# Patient Record
Sex: FEMALE | Race: WHITE | NOT HISPANIC OR LATINO | Employment: OTHER | URBAN - METROPOLITAN AREA
[De-identification: names, ages, dates, MRNs, and addresses within clinical notes are randomized per-mention and may not be internally consistent; named-entity substitution may affect disease eponyms.]

---

## 2021-03-08 ENCOUNTER — RECORDS - HEALTHEAST (OUTPATIENT)
Dept: LAB | Facility: CLINIC | Age: 86
End: 2021-03-08

## 2021-03-09 LAB
ANION GAP SERPL CALCULATED.3IONS-SCNC: 8 MMOL/L (ref 5–18)
BNP SERPL-MCNC: 303 PG/ML (ref 0–167)
BUN SERPL-MCNC: 15 MG/DL (ref 8–28)
CALCIUM SERPL-MCNC: 8.4 MG/DL (ref 8.5–10.5)
CHLORIDE BLD-SCNC: 109 MMOL/L (ref 98–107)
CO2 SERPL-SCNC: 23 MMOL/L (ref 22–31)
CREAT SERPL-MCNC: 0.65 MG/DL (ref 0.6–1.1)
ERYTHROCYTE [DISTWIDTH] IN BLOOD BY AUTOMATED COUNT: 14 % (ref 11–14.5)
FERRITIN SERPL-MCNC: 114 NG/ML (ref 10–130)
FOLATE SERPL-MCNC: 6.8 NG/ML
GFR SERPL CREATININE-BSD FRML MDRD: >60 ML/MIN/1.73M2
GLUCOSE BLD-MCNC: 82 MG/DL (ref 70–125)
HCT VFR BLD AUTO: 31.7 % (ref 35–47)
HGB BLD-MCNC: 9.9 G/DL (ref 12–16)
IRON SATN MFR SERPL: 10 % (ref 20–50)
IRON SERPL-MCNC: 26 UG/DL (ref 42–175)
MCH RBC QN AUTO: 27.7 PG (ref 27–34)
MCHC RBC AUTO-ENTMCNC: 31.2 G/DL (ref 32–36)
MCV RBC AUTO: 89 FL (ref 80–100)
PLATELET # BLD AUTO: 317 THOU/UL (ref 140–440)
PMV BLD AUTO: 10.3 FL (ref 8.5–12.5)
POTASSIUM BLD-SCNC: 4 MMOL/L (ref 3.5–5)
RBC # BLD AUTO: 3.57 MILL/UL (ref 3.8–5.4)
SODIUM SERPL-SCNC: 140 MMOL/L (ref 136–145)
TIBC SERPL-MCNC: 266 UG/DL (ref 313–563)
TRANSFERRIN SERPL-MCNC: 212 MG/DL (ref 212–360)
TSH SERPL DL<=0.005 MIU/L-ACNC: 4.45 UIU/ML (ref 0.3–5)
VIT B12 SERPL-MCNC: 317 PG/ML (ref 213–816)
WBC: 8.6 THOU/UL (ref 4–11)

## 2021-05-16 ENCOUNTER — RECORDS - HEALTHEAST (OUTPATIENT)
Dept: LAB | Facility: CLINIC | Age: 86
End: 2021-05-16

## 2021-05-18 ENCOUNTER — RECORDS - HEALTHEAST (OUTPATIENT)
Dept: LAB | Facility: CLINIC | Age: 86
End: 2021-05-18

## 2021-05-18 LAB — CK SERPL-CCNC: 12 U/L (ref 30–190)

## 2021-05-19 LAB — ERYTHROCYTE [SEDIMENTATION RATE] IN BLOOD BY WESTERGREN METHOD: 63 MM/HR (ref 0–20)

## 2021-06-26 ENCOUNTER — APPOINTMENT (OUTPATIENT)
Dept: CT IMAGING | Facility: CLINIC | Age: 86
DRG: 288 | End: 2021-06-26
Attending: STUDENT IN AN ORGANIZED HEALTH CARE EDUCATION/TRAINING PROGRAM
Payer: MEDICARE

## 2021-06-26 ENCOUNTER — TRANSFERRED RECORDS (OUTPATIENT)
Dept: HEALTH INFORMATION MANAGEMENT | Facility: CLINIC | Age: 86
End: 2021-06-26

## 2021-06-26 ENCOUNTER — HOSPITAL ENCOUNTER (INPATIENT)
Facility: CLINIC | Age: 86
LOS: 12 days | Discharge: NURSING, REHAB OR LONG-TERM CARE | DRG: 288 | End: 2021-07-08
Attending: HOSPITALIST | Admitting: INTERNAL MEDICINE
Payer: MEDICARE

## 2021-06-26 DIAGNOSIS — I63.12 CEREBROVASCULAR ACCIDENT (CVA) DUE TO EMBOLISM OF BASILAR ARTERY (H): ICD-10-CM

## 2021-06-26 DIAGNOSIS — I48.21 PERMANENT ATRIAL FIBRILLATION (H): ICD-10-CM

## 2021-06-26 DIAGNOSIS — I36.1 NONRHEUMATIC TRICUSPID VALVE REGURGITATION: ICD-10-CM

## 2021-06-26 DIAGNOSIS — I65.1 BASILAR ARTERY THROMBOSIS: Primary | ICD-10-CM

## 2021-06-26 DIAGNOSIS — G44.209 TENSION HEADACHE: ICD-10-CM

## 2021-06-26 DIAGNOSIS — E83.39 HYPOPHOSPHATEMIA: ICD-10-CM

## 2021-06-26 DIAGNOSIS — K21.9 GASTROESOPHAGEAL REFLUX DISEASE WITHOUT ESOPHAGITIS: ICD-10-CM

## 2021-06-26 DIAGNOSIS — I33.0 SUBACUTE BACTERIAL ENDOCARDITIS: ICD-10-CM

## 2021-06-26 PROBLEM — Z95.2 S/P AVR: Status: ACTIVE | Noted: 2021-06-26

## 2021-06-26 PROBLEM — K31.819 ARTERIOVENOUS MALFORMATION OF STOMACH: Status: ACTIVE | Noted: 2021-06-26

## 2021-06-26 PROBLEM — I10 BENIGN ESSENTIAL HYPERTENSION: Status: ACTIVE | Noted: 2021-06-26

## 2021-06-26 PROBLEM — Z86.73 HISTORY OF STROKE: Status: ACTIVE | Noted: 2021-06-26

## 2021-06-26 PROBLEM — I63.9 STROKE (H): Status: ACTIVE | Noted: 2021-06-26

## 2021-06-26 LAB
ANION GAP SERPL CALCULATED.3IONS-SCNC: 4 MMOL/L (ref 3–14)
BUN SERPL-MCNC: 16 MG/DL (ref 7–30)
CALCIUM SERPL-MCNC: 7.5 MG/DL (ref 8.5–10.1)
CHLORIDE SERPL-SCNC: 114 MMOL/L (ref 94–109)
CO2 SERPL-SCNC: 21 MMOL/L (ref 20–32)
CREAT SERPL-MCNC: 1.41 MG/DL (ref 0.52–1.04)
ERYTHROCYTE [DISTWIDTH] IN BLOOD BY AUTOMATED COUNT: 16.6 % (ref 10–15)
GFR SERPL CREATININE-BSD FRML MDRD: 33 ML/MIN/{1.73_M2}
GLUCOSE SERPL-MCNC: 88 MG/DL (ref 70–99)
HCT VFR BLD AUTO: 26.6 % (ref 35–47)
HGB BLD-MCNC: 8 G/DL (ref 11.7–15.7)
MCH RBC QN AUTO: 26.1 PG (ref 26.5–33)
MCHC RBC AUTO-ENTMCNC: 30.1 G/DL (ref 31.5–36.5)
MCV RBC AUTO: 87 FL (ref 78–100)
PLATELET # BLD AUTO: 188 10E9/L (ref 150–450)
POTASSIUM SERPL-SCNC: 3.6 MMOL/L (ref 3.4–5.3)
RBC # BLD AUTO: 3.07 10E12/L (ref 3.8–5.2)
SODIUM SERPL-SCNC: 139 MMOL/L (ref 133–144)
WBC # BLD AUTO: 9.8 10E9/L (ref 4–11)

## 2021-06-26 PROCEDURE — 70450 CT HEAD/BRAIN W/O DYE: CPT

## 2021-06-26 PROCEDURE — 200N000001 HC R&B ICU

## 2021-06-26 PROCEDURE — 80048 BASIC METABOLIC PNL TOTAL CA: CPT | Performed by: STUDENT IN AN ORGANIZED HEALTH CARE EDUCATION/TRAINING PROGRAM

## 2021-06-26 PROCEDURE — 99291 CRITICAL CARE FIRST HOUR: CPT | Mod: GC | Performed by: PSYCHIATRY & NEUROLOGY

## 2021-06-26 PROCEDURE — 0042T CT HEAD PERFUSION WITH CONTRAST: CPT

## 2021-06-26 PROCEDURE — 999N001017 HC STATISTIC GLUCOSE BY METER IP

## 2021-06-26 PROCEDURE — 250N000011 HC RX IP 250 OP 636: Performed by: HOSPITALIST

## 2021-06-26 PROCEDURE — 250N000009 HC RX 250: Performed by: INTERNAL MEDICINE

## 2021-06-26 PROCEDURE — 36415 COLL VENOUS BLD VENIPUNCTURE: CPT | Performed by: STUDENT IN AN ORGANIZED HEALTH CARE EDUCATION/TRAINING PROGRAM

## 2021-06-26 PROCEDURE — 250N000011 HC RX IP 250 OP 636: Performed by: INTERNAL MEDICINE

## 2021-06-26 PROCEDURE — 258N000003 HC RX IP 258 OP 636: Performed by: INTERNAL MEDICINE

## 2021-06-26 PROCEDURE — 250N000011 HC RX IP 250 OP 636: Performed by: STUDENT IN AN ORGANIZED HEALTH CARE EDUCATION/TRAINING PROGRAM

## 2021-06-26 PROCEDURE — 85027 COMPLETE CBC AUTOMATED: CPT | Performed by: STUDENT IN AN ORGANIZED HEALTH CARE EDUCATION/TRAINING PROGRAM

## 2021-06-26 PROCEDURE — 70496 CT ANGIOGRAPHY HEAD: CPT

## 2021-06-26 PROCEDURE — 99223 1ST HOSP IP/OBS HIGH 75: CPT | Mod: AI | Performed by: INTERNAL MEDICINE

## 2021-06-26 PROCEDURE — C9113 INJ PANTOPRAZOLE SODIUM, VIA: HCPCS | Performed by: INTERNAL MEDICINE

## 2021-06-26 RX ORDER — HEPARIN SODIUM 10000 [USP'U]/100ML
0-5000 INJECTION, SOLUTION INTRAVENOUS CONTINUOUS
Status: DISCONTINUED | OUTPATIENT
Start: 2021-06-26 | End: 2021-06-29

## 2021-06-26 RX ORDER — SODIUM CHLORIDE 9 MG/ML
INJECTION, SOLUTION INTRAVENOUS CONTINUOUS
Status: DISCONTINUED | OUTPATIENT
Start: 2021-06-26 | End: 2021-06-28

## 2021-06-26 RX ORDER — METOPROLOL TARTRATE 1 MG/ML
5 INJECTION, SOLUTION INTRAVENOUS EVERY 6 HOURS
Status: DISCONTINUED | OUTPATIENT
Start: 2021-06-26 | End: 2021-06-26

## 2021-06-26 RX ORDER — METOPROLOL TARTRATE 25 MG/1
12.5 TABLET, FILM COATED ORAL 2 TIMES DAILY
Status: ON HOLD | COMMUNITY
End: 2021-07-06

## 2021-06-26 RX ORDER — GABAPENTIN 300 MG/1
300 CAPSULE ORAL AT BEDTIME
Status: ON HOLD | COMMUNITY
End: 2021-07-06

## 2021-06-26 RX ORDER — LANOLIN ALCOHOL/MO/W.PET/CERES
1000 CREAM (GRAM) TOPICAL DAILY
COMMUNITY

## 2021-06-26 RX ORDER — MIRTAZAPINE 15 MG/1
15 TABLET, FILM COATED ORAL AT BEDTIME
Status: ON HOLD | COMMUNITY
End: 2021-06-26

## 2021-06-26 RX ORDER — ONDANSETRON 4 MG/1
4 TABLET, ORALLY DISINTEGRATING ORAL EVERY MORNING
Status: ON HOLD | COMMUNITY
End: 2021-07-06

## 2021-06-26 RX ORDER — HEPARIN SODIUM 10000 [USP'U]/100ML
0-5000 INJECTION, SOLUTION INTRAVENOUS CONTINUOUS
Status: DISCONTINUED | OUTPATIENT
Start: 2021-06-26 | End: 2021-06-26

## 2021-06-26 RX ORDER — METOPROLOL TARTRATE 1 MG/ML
2.5 INJECTION, SOLUTION INTRAVENOUS EVERY 6 HOURS
Status: DISCONTINUED | OUTPATIENT
Start: 2021-06-26 | End: 2021-06-28

## 2021-06-26 RX ORDER — IOPAMIDOL 755 MG/ML
120 INJECTION, SOLUTION INTRAVASCULAR ONCE
Status: COMPLETED | OUTPATIENT
Start: 2021-06-26 | End: 2021-06-26

## 2021-06-26 RX ORDER — ONDANSETRON 4 MG/1
4 TABLET, ORALLY DISINTEGRATING ORAL EVERY 6 HOURS PRN
Status: DISCONTINUED | OUTPATIENT
Start: 2021-06-26 | End: 2021-07-08 | Stop reason: HOSPADM

## 2021-06-26 RX ORDER — LATANOPROST 50 UG/ML
1 SOLUTION/ DROPS OPHTHALMIC AT BEDTIME
COMMUNITY

## 2021-06-26 RX ORDER — ONDANSETRON 2 MG/ML
4 INJECTION INTRAMUSCULAR; INTRAVENOUS EVERY 6 HOURS PRN
Status: DISCONTINUED | OUTPATIENT
Start: 2021-06-26 | End: 2021-07-08 | Stop reason: HOSPADM

## 2021-06-26 RX ORDER — LIDOCAINE 40 MG/G
CREAM TOPICAL
Status: DISCONTINUED | OUTPATIENT
Start: 2021-06-26 | End: 2021-07-08 | Stop reason: HOSPADM

## 2021-06-26 RX ORDER — MIRTAZAPINE 15 MG/1
15 TABLET, ORALLY DISINTEGRATING ORAL AT BEDTIME
COMMUNITY

## 2021-06-26 RX ORDER — ATORVASTATIN CALCIUM 40 MG/1
40 TABLET, FILM COATED ORAL AT BEDTIME
Status: ON HOLD | COMMUNITY
End: 2021-07-06

## 2021-06-26 RX ORDER — LANOLIN ALCOHOL/MO/W.PET/CERES
2000 CREAM (GRAM) TOPICAL DAILY
COMMUNITY

## 2021-06-26 RX ORDER — PIPERACILLIN SODIUM, TAZOBACTAM SODIUM 2; .25 G/10ML; G/10ML
2.25 INJECTION, POWDER, LYOPHILIZED, FOR SOLUTION INTRAVENOUS EVERY 6 HOURS
Status: DISCONTINUED | OUTPATIENT
Start: 2021-06-26 | End: 2021-06-30

## 2021-06-26 RX ORDER — FERROUS SULFATE 325(65) MG
325 TABLET ORAL
COMMUNITY

## 2021-06-26 RX ORDER — ACETAMINOPHEN 325 MG/1
650 TABLET ORAL EVERY 8 HOURS PRN
COMMUNITY

## 2021-06-26 RX ORDER — ACETAMINOPHEN 650 MG/1
650 SUPPOSITORY RECTAL EVERY 4 HOURS PRN
Status: DISCONTINUED | OUTPATIENT
Start: 2021-06-26 | End: 2021-07-08 | Stop reason: HOSPADM

## 2021-06-26 RX ORDER — ONDANSETRON 4 MG/1
4 TABLET, ORALLY DISINTEGRATING ORAL 2 TIMES DAILY PRN
COMMUNITY

## 2021-06-26 RX ADMIN — METOPROLOL TARTRATE 2.5 MG: 5 INJECTION INTRAVENOUS at 23:54

## 2021-06-26 RX ADMIN — HEPARIN SODIUM 900 UNITS/HR: 10000 INJECTION, SOLUTION INTRAVENOUS at 18:03

## 2021-06-26 RX ADMIN — PANTOPRAZOLE SODIUM 40 MG: 40 INJECTION, POWDER, FOR SOLUTION INTRAVENOUS at 21:06

## 2021-06-26 RX ADMIN — SODIUM CHLORIDE: 9 INJECTION, SOLUTION INTRAVENOUS at 16:37

## 2021-06-26 RX ADMIN — IOPAMIDOL 120 ML: 755 INJECTION, SOLUTION INTRAVENOUS at 14:46

## 2021-06-26 RX ADMIN — PIPERACILLIN SODIUM AND TAZOBACTAM SODIUM 2.25 G: 2; .25 INJECTION, POWDER, LYOPHILIZED, FOR SOLUTION INTRAVENOUS at 16:37

## 2021-06-26 RX ADMIN — ONDANSETRON 4 MG: 2 INJECTION INTRAMUSCULAR; INTRAVENOUS at 23:54

## 2021-06-26 RX ADMIN — METOPROLOL TARTRATE 2.5 MG: 5 INJECTION INTRAVENOUS at 18:16

## 2021-06-26 RX ADMIN — PIPERACILLIN SODIUM AND TAZOBACTAM SODIUM 2.25 G: 2; .25 INJECTION, POWDER, LYOPHILIZED, FOR SOLUTION INTRAVENOUS at 21:06

## 2021-06-26 SDOH — HEALTH STABILITY: MENTAL HEALTH: HOW OFTEN DO YOU HAVE 6 OR MORE DRINKS ON ONE OCCASION?: NOT ASKED

## 2021-06-26 SDOH — ECONOMIC STABILITY: TRANSPORTATION INSECURITY
IN THE PAST 12 MONTHS, HAS LACK OF TRANSPORTATION KEPT YOU FROM MEETINGS, WORK, OR FROM GETTING THINGS NEEDED FOR DAILY LIVING?: NOT ASKED

## 2021-06-26 SDOH — ECONOMIC STABILITY: FOOD INSECURITY: WITHIN THE PAST 12 MONTHS, YOU WORRIED THAT YOUR FOOD WOULD RUN OUT BEFORE YOU GOT MONEY TO BUY MORE.: NOT ASKED

## 2021-06-26 SDOH — HEALTH STABILITY: MENTAL HEALTH: HOW OFTEN DO YOU HAVE A DRINK CONTAINING ALCOHOL?: NOT ASKED

## 2021-06-26 SDOH — HEALTH STABILITY: MENTAL HEALTH: HOW MANY STANDARD DRINKS CONTAINING ALCOHOL DO YOU HAVE ON A TYPICAL DAY?: NOT ASKED

## 2021-06-26 SDOH — ECONOMIC STABILITY: TRANSPORTATION INSECURITY
IN THE PAST 12 MONTHS, HAS THE LACK OF TRANSPORTATION KEPT YOU FROM MEDICAL APPOINTMENTS OR FROM GETTING MEDICATIONS?: NOT ASKED

## 2021-06-26 SDOH — ECONOMIC STABILITY: INCOME INSECURITY: HOW HARD IS IT FOR YOU TO PAY FOR THE VERY BASICS LIKE FOOD, HOUSING, MEDICAL CARE, AND HEATING?: NOT ASKED

## 2021-06-26 SDOH — ECONOMIC STABILITY: FOOD INSECURITY: WITHIN THE PAST 12 MONTHS, THE FOOD YOU BOUGHT JUST DIDN'T LAST AND YOU DIDN'T HAVE MONEY TO GET MORE.: NOT ASKED

## 2021-06-26 ASSESSMENT — ACTIVITIES OF DAILY LIVING (ADL)
ADLS_ACUITY_SCORE: 24
ADLS_ACUITY_SCORE: 30

## 2021-06-26 ASSESSMENT — MIFFLIN-ST. JEOR: SCORE: 1110.38

## 2021-06-26 NOTE — LETTER
This letter is to give you information only. No action is required on your part.                               Beneficiary Notification Letter - BPCI Advanced                     Your Doctor or Hospital Has Joined Medicare s New Payment and                                                          Service Delivery Model     Hello,     We wanted to let you know that your health care provider, HECTOR Torrez, has volunteered to take part in our Centers for Medicare & Medicaid Services (CMS) Bundled Payments for Care Improvement Advanced Model (BPCI Advanced). This doesn t change your Medicare rights or benefits and you don t need to do anything.      What are bundled payments?   A bundled payment combines, or bundles together, payments that Medicare makes to your health care providers for the many different kinds of medical services you might get in a specific time period. In BPCI Advanced, this time period could include a hospital inpatient stay or outpatient procedure, plus 90 days.     Why would Medicare bundle payments?   Bundled payments are thought of as a  value-based  way to pay because health care providers are responsible for both the quality and cost of medical care they give. This is a relatively new way of paying health care providers compared to the fee-for-service  way Medicare has traditionally paid, where providers are paid separately for each service they provide. Bundled payments encourage these providers to work together to provide better, more coordinated care during your hospital stay, or outpatient procedure, and through your recovery.     What does BPCI Advanced mean for me?   You re more likely to get even better care when hospitals, doctors, and other health care providers work together. In BPCI Advanced, hospitals, doctors, and other health care providers may be rewarded for providing better, more coordinated health care. Medicare will watch BPCI Advanced participants closely  to make sure that you and other patients keep getting efficient, high quality care.     What do I need to know about BPCI Advanced?   What s most important for you to know is that your Medicare rights and benefits don t change because your health care provider is participating in BPCI Advanced. Medicare will keep covering all of your medically necessary services.       January 2019    Beneficiary Notification Letter - BPCI Advanced (page 2)     Even though Medicare will pay your doctor in a different way under BPCI Advanced, how much you have to pay won t change. Health care providers and suppliers who are enrolled in Medicare will submit their Medicare claims like they always have.     You ll have all the same Medicare rights and protections, including the right to choose which hospital, doctor, or other health care provider you see. If you don t want to get care from a health care provider who s participating in BPCI Advanced, then you ll have to choose a different health care provider who s not participating in the Model.     How can I give feedback about my health care?   Medicare might ask you to take a voluntary survey about the services and care you received from [facility] during your hospital stay or outpatient procedure and for a specific period of time afterwards. You can decide whether you want to take the voluntary survey, but if you do, it ll help Medicare make BPCI Advanced and the care of other Medicare patients better.     If you have concerns or complaints about your care, you can:     Talk to your doctor or health care provider.     Contact your Beneficiary and Family Centered Care Quality Improvement Organization (CC-QIO). You can get your BFCC-QIO s phone number at Medicare.gov/contacts or by calling 1-800-MEDICARE. [employer] users can call 1-997.874.4659.     Where can I learn more about BPCI Advanced?   Learn more about BPCI Advanced at https://innovation.cms.gov/initiatives/bpci-advanced/:      A list of all the hospitals and physician group practices in the country participating in BPCI Advanced.     All of the inpatient and outpatient Clinical Episodes that are currently included under BPCI Advanced. A Clinical Episode is a grouping of medical conditions or diagnoses that are included in the BPCI Advanced Model.                                   January 2019

## 2021-06-26 NOTE — H&P
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  Concerning that this developed while anticoagulated with Eliquis bid   -- Neurology consulted, currently admitted to ICU in case intervention required     Permanent atrial fib -- on Eliquis   -- on Metoprolol     Fever, temp to 101.5 -- source unclear, right dorsal foot looks more like bruise than cellulitis, but is circled with marker and will watch, and hx of recurrent UTI's but UA with leukocyte esterase negative this AM   -- continue IV Zosyn (no further IV Vanco, Creat 1.5), and await cultures from [facility]    CRF stage 3 -- creat 1.5 this AM, and got contrast with CTA, and now neurology planning to repeat CTA   -- IV NS   -- BMP in AM    Anemia -- appears chronic, and does not appear active GI bleed at this time, but at risk given AVM's    Active Problems:    History of stroke -- prior old Right frontal, and recently left Cerebellar on 4/8/21, and unable to walk related to left leg weakness      Benign essential hypertension      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  Recent GI bleed from gastric AVM's appears stable on PPI, and will continue IV Protonix bid for now.      DVT Prophylaxis: IV heparin   Code Status: DNR / DNI    Disposition: Expected discharge in 3 days, anticipate may need TCU.     Tayo Cai  Pager: [phone]  Cell Phone:  [phone]     Primary Care Physician   No primary care provider on file.    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 but actually the last hgb was 7.6 on 6/20/21, so appears stable.   Daughter did report small amount of BRBPR on Thursday (2 days ago), but 2 normal BM's after that.  Head CT shows old strokes, and head CTA shows new filling defect in basilar artery suggesting thrombosis.  Discussed with Dr. Summers, Neurology, and he suggested no IV heparin at the time of transfer.  She is DNR/DNI per her 2 children with her in room.      PAST MEDICAL HISTORY    Past Medical History:   Diagnosis Date     Chronic atrial fibrillation -- on Eliquis      Gastric AVM's with bleeding 6/16/21      History of recurrent UTIs      History of strokes     Right frontal previous, left Cerebellar 4/14/21     S/P AVR (Bioprosthetic) and Ascending Aorta tube graft         PAST SURGICAL HISTORY    Past Surgical History:   Procedure Laterality Date     REPAIR  ANEURYSM ASCENDING AORTA      tube graft     REPLACE VALVE AORTIC          Prior to Admission Medications   Prior to Admission Medications   Prescriptions Last Dose Informant Patient Reported? Taking?   acetaminophen (TYLENOL) 325 MG tablet Past Month at seldom  Yes Yes   Sig: Take 650 mg by mouth every 8 hours as needed for mild pain   apixaban ANTICOAGULANT (ELIQUIS) 5 MG tablet 6/25/2021 at pm  Yes Yes   Sig: Take 5 mg by mouth 2 times daily   atorvastatin (LIPITOR) 40 MG tablet 6/25/2021 at pm  Yes Yes   Sig: Take 40 mg by mouth At Bedtime   cyanocobalamin (VITAMIN B-12) 1000 MCG tablet 6/25/2021 at am  Yes Yes   Sig: Take 1,000 mcg by mouth daily   ferrous sulfate (FEROSUL) 325 (65 Fe) MG tablet 6/25/2021  Yes Yes   Sig: Take 325 mg by mouth three times a week Mon - Wed - Fri. Takes with orange juice.   folic acid (FOLVITE) 400 MCG tablet 6/25/2021 at am  Yes Yes   Sig: Take 2,000 mcg by mouth daily 400 mcg x 5 tablets for 1 month then on 7/18/21 decrease to 400 mcg daily   gabapentin (NEURONTIN) 300 MG capsule 6/25/2021 at pm  Yes Yes   Sig: Take 300 mg by mouth At Bedtime   latanoprost (XALATAN) 0.005 % ophthalmic solution 6/25/2021 at pm  Yes Yes   Sig: Place 1 drop into both eyes At Bedtime   metoprolol tartrate (LOPRESSOR) 25 MG tablet 6/25/2021 at pm  Yes Yes   Sig: Take 12.5 mg by mouth 2 times daily   mirtazapine (REMERON SOL-TAB) 15 MG ODT 6/25/2021 at pm  Yes Yes   Sig: 15 mg by Orally disintegrating tablet route At Bedtime   omeprazole (PRILOSEC) 20 MG DR capsule 6/25/2021 at am  Yes Yes   Sig: Take 20 mg by mouth daily   ondansetron (ZOFRAN-ODT) 4 MG ODT tab 6/25/2021 at am  Yes Yes   Sig: Take 4 mg by mouth every morning   ondansetron (ZOFRAN-ODT) 4 MG ODT tab Not Needed  Yes Yes   Sig: Take 4 mg by mouth 2 times daily as needed for nausea      Facility-Administered Medications: None     Allergies   No Known Allergies    SOCIAL HISTORY    Social History     Social History Narrative    , 5  children, she lives in a house with the help of her daughter who lives with her (she can not walk because of left leg weakness from prior stroke).  Antonette is POA (daughter) and confirms DNR/DNI code status.  (last updated 6/26/2021)       Social History     Tobacco Use     Smoking status: Never Smoker   Substance Use Topics     Alcohol use: Not Currently     Drug use: Not on file        FAMILY HISTORY    Family History   Problem Relation Age of Onset     Cerebrovascular Disease Mother      Other - See Comments Father         MVA, then suicide        Review of Systems   Review of systems not obtained due to patient factors - drowsiness      PHYSICAL EXAM     Temp: 98.4  F (36.9  C) Temp src: Oral BP: 98/53 Pulse: 73   Resp: 12 SpO2: 100 % O2 Device: Nasal cannula Oxygen Delivery: 2 LPM  Vital Signs with Ranges  Temp:  [98.4  F (36.9  C)] 98.4  F (36.9  C)  Pulse:  [73-85] 73  Resp:  [8-12] 12  BP: ()/(50-59) 98/53  SpO2:  [100 %] 100 %  164 lbs 14.47 oz    Constitutional: Sleepy but will open eyes to physical stimulation and nod yes or no  Eyes: Conjunctiva and pupils examined and normal.  HEENT: Moist mucous membranes, normal dentition.  Respiratory: Clear to auscultation bilaterally, no crackles or wheezing.  Cardiovascular: irregular rate and rhythm, normal S1 and S2, and no murmur noted, no carotid bruits.  2+ bilateral ankle edema.   GI: Soft, non-distended, non-tender, normal bowel sounds.  Lymph/Hematologic: No anterior cervical, supraclavicular or axillary adenopathy.  Skin: No rashes, no cyanosis.   Musculoskeletal: No joint swelling, erythema or tenderness.  Neurologic: Drowsy, opens eyes to pain, will respond to simple questions with yes or no, mild right face droop noted when asked to smile,  4/5 bilaterally  Psychiatric:  No obvious anxiety or depression.    Data   Data reviewed today:  I personally reviewed no images or EKG's today.  Recent Labs   Lab 06/26/21  1531   WBC 9.8   HGB 8.0*   MCV 87          POTASSIUM 3.6   CHLORIDE 114*   CO2 21   BUN 16   CR 1.41*   ANIONGAP 4   MANDI 7.5*   GLC 88       Imaging:  Recent Results (from the past 24 hour(s))   CTA Head Neck with Contrast    Narrative    EXAM: CTA HEAD AND NECK  LOCATION: [facility]  DATE/TIME: 6/26/2021 7:55 AM    INDICATION: Neuro deficit, acute, stroke suspected Stroke code, facial droop  COMPARISON: CT angiogram head and neck 04/07/2021.  CONTRAST: Iopamidol (Isovue-370)    75ml  TECHNIQUE: Head and neck CT angiogram with IV contrast. Noncontrast head CT followed by axial helical CT images of the head and neck vessels obtained during the arterial phase of intravenous contrast administration. Axial 2D reconstructed images and   multiplanar 3D MIP reconstructed images of the head and neck vessels were performed by the technologist. Dose reduction techniques were used. All stenosis measurements made according to NASCET criteria unless otherwise specified.    FINDINGS:   NONCONTRAST HEAD CT:   No evidence of acute intracranial hemorrhage or mass effect. Chronic infarct within the right frontal lobe, left medial temporal lobe and left cerebellum. Scattered foci of decreased attenuation within the cerebral hemispheric white matter which are   nonspecific, though most commonly ascribed to chronic small vessel ischemic disease. The ventricles and sulci are prominent consistent with moderate brain parenchymal volume loss. Chronic lacunar infarct within the left joey. Gray-white matter   differentiation is otherwise maintained. The basilar cisterns are patent.    The globes are unremarkable. The partially imaged are nasal sinuses, mastoid air cells and middle ear cavities are unremarkable. The visualized skull base and calvarium are unremarkable.    HEAD CTA:  Examination of the anterior circulation demonstrates flow within the bilateral internal carotid and proximal aspects of the anterior and middle cerebral  arteries. The anterior communicating artery is demonstrated. Examination of the posterior   circulation   demonstrates flow within the distal vertebral artery. New filling defect within the basilar artery just distal to the confluence of the vertebral artery and basilar artery, concerning for a partially occlusive thrombus. This is best demonstrated on   axial image #327, series 7. There is flow within the distal basilar, proximal aspects of the posterior cerebral arteries. The right and left posterior communicating arteries are not demonstrated with certainty.    No other evidence of pathologic vascular occlusion or saccular aneurysm within the visualized intracranial circulation by CT angiography.    NECK CTA:  The aortic arch has normal branching configuration. There is flow within the brachiocephalic, common carotid, proximal aspects of the subclavian arteries.    The right common carotid artery is patent. Examination of the right carotid bulb demonstrates no evidence of hemodynamically significant stenosis within the right internal carotid artery within the neck.    The left common carotid artery is patent. Examination of the left carotid bulb demonstrates no evidence of hemodynamically significant stenosis within the left internal carotid artery within the neck.    The right and left vertebral arteries are patent.    The parotid, submandibular and thyroid glands are unremarkable.    No evidence of cervical lymphadenopathy by size or morphologic criteria.    The partially imaged lung apices are unremarkable.    No suspicious osseous lesions.      Impression    HEAD CT:  1.  No evidence of acute intracranial hemorrhage or mass effect.  2.  Chronic multifocal infarcts within the right frontal lobe, left occipital lobe, left cerebellum and left joey.  3.  Mild nonspecific white matter changes.  4.  Moderate brain parenchymal volume loss.    Dr. Hdez discussed the results with Dr. Astorga on 6/26/2021 7:50 AM  by telephone.    HEAD CTA:   1.  New filling defect within the basilar artery just distal to the confluence of the vertebral artery and basilar artery, concerning for a partially occlusive thrombus. This is best demonstrated on axial image #327, series 7.  2.  No other evidence of pathologic vascular occlusion or saccular aneurysm within the visualized intrarenal circulation by CT angiography.    NECK CTA:  1.  No evidence of hemodynamically significant stenosis within either internal carotid artery within the neck.    Dr. Hdez discussed the results with Dr. Astorga on 6/26/2021 8:13 AM by telephone.   XR Chest Port 1 View    Narrative    EXAM: XR CHEST 1 VIEW PORTABLE  LOCATION: [facility]  DATE/TIME: 6/26/2021 8:34 AM    INDICATION: fever  COMPARISON: Chest x-ray 04/07/2021      Impression    Poststernotomy with valve replacement. Patchy bibasilar opacities suspicious for pneumonic infiltrates, left greater than right. Suspected trace left-sided pleural fluid. Stable upper normal heart size and central vascular prominence.   CT Head w/o Contrast    Narrative    CT OF THE HEAD WITHOUT CONTRAST 6/26/2021 3:10 PM     COMPARISON: None available    HISTORY: Neurologic deficit, acute, stroke suspected. Known partially  occlusive basilar thrombus.    TECHNIQUE: 5 mm thick axial CT images of the head were acquired  without IV contrast material.    FINDINGS: There is mild diffuse cerebral volume loss. There are subtle  patchy areas of decreased density in the cerebral white matter  bilaterally that are consistent with sequela of chronic small vessel  ischemic disease. There are small areas of hypodensity in the high  posterolateral right frontal lobe (series 3, image 22) and in the  posteromedial left occipital lobe that could represent subacute  ischemic infarct.    The ventricles and basal cisterns are within normal limits in  configuration given the degree of cerebral volume loss.  There is  no  midline shift. There are no extra-axial fluid collections.    No intracranial hemorrhage, mass or recent infarct.    The visualized paranasal sinuses are well-aerated. There is no  mastoiditis. There are no fractures of the visualized bones.      Impression    IMPRESSION:   1. Possible subacute ischemic infarcts in the high right frontal and  left occipital lobes. Brain MRI may be helpful for further evaluation.    2. Diffuse cerebral volume loss and cerebral white matter changes  consistent with chronic small vessel ischemic disease.        Radiation dose for this scan was reduced using automated exposure  control, adjustment of the mA and/or kV according to patient size, or  iterative reconstruction technique    SMITHA WEST MD   CTA Head Neck with Contrast    Narrative    CT ANGIOGRAM OF THE HEAD AND NECK WITHOUT AND WITH CONTRAST  6/26/2021  3:12 PM     COMPARISON: None available    HISTORY: Stroke/transient ischemic attack, assess intracranial  arteries. Known partially occlusive basilar thrombus, more somnolent.    TECHNIQUE:  Precontrast localizing scans were followed by CT  angiography with an injection of 70mL Isovue-370 nonionic intravenous  contrast material with scans through the head and neck.  Images were  transferred to a separate 3-D workstation where multiplanar  reformations and 3-D images were created.  Estimates of carotid  stenoses are made relative to the distal internal carotid artery  diameters except as noted.      FINDINGS:   Neck CTA: The common carotid arteries bilaterally are patent without  vascular narrowing. The cervical internal carotid arteries bilaterally  are tortuous but are patent without vascular narrowing. The vertebral  arteries bilaterally are tortuous but are patent without vascular  narrowing.    Head CTA: There is a filling defect at the confluence between the  right vertebral artery and basilar artery consistent with the history  of nonocclusive basilar artery  thrombus. The basilar artery is  otherwise patent and unremarkable. The bilateral intracranial distal  internal carotid, bilateral anterior cerebral, bilateral middle  cerebral and bilateral posterior cerebral arteries are patent and  unremarkable. The anterior communicating artery is patent.      Impression    IMPRESSION:  1. Presumed nonocclusive thrombus at the proximal basilar artery is  noted consistent with patient's history of partially occlusive basilar  thrombus.  2. Otherwise, normal neck and head CTA.      Radiation dose for this scan was reduced using automated exposure  control, adjustment of the mA and/or kV according to patient size, or  iterative reconstruction technique    SMITHA WEST MD   CT Head Perfusion w Contrast    Narrative    CT BRAIN PERFUSION 6/26/2021 3:14 PM    COMPARISON: None    HISTORY: Neurologic deficit, acute, stroke suspected.    TECHNIQUE: Time sequential axial CT images of the head were acquired  during the administration of intravenous contrast (50mL Isovue-370).  CTA images of the [place] as well as color perfusion maps of  the brain were created from this time sequential axial source data.      Impression    IMPRESSION: There is delayed arrival of the contrast bolus to the left  PICA distribution along the undersurface of the left cerebellar  hemisphere that results in decreased cerebral blood flow but no  significant alteration of cerebral blood volume. No other perfusion  defects.    Radiation dose for this scan was reduced using automated exposure  control, adjustment of the mA and/or kV according to patient size, or  iterative reconstruction technique    SMITHA WEST MD

## 2021-06-26 NOTE — CONSULTS
"   [facility]    Stroke Consult Note    Reason for Consult:  Partially occlusive basilar artery thrombosis    Chief Complaint: No chief complaint on file.       MAYA Thayer is a 89 year old female with history of atrial fibrillation [id number] or greater, not on anticoagulation and recent admission for upper GI bleed who presented to [facility] for right facial droop and is found to have a partially occlusive basilar artery thrombus likely explaining her brainstem stroke symptoms. Emergent thrombectomy was not pursued due to her isolated symptoms. She was transferred for monitoring and potential intervention should her exam worsen.     Per her family, she is quite sleepy most of the time at baseline and does not walk. Today, upon arrival she is quite somnolent and unable to provide any history. Her bedside RN and family at the bedside say this is a change from her initial assessment upon arrival, and from this morning. Repeat CT/CTA/CTP confirms stability of proximal to mid-basilar artery partial occlusion.    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 Somnolence- possibly represents diminished brainstem perfusion, or just sleep-related fluctuations in exam (family says she sleeps most of the day at baseline). Will continue to monitor closely    Recommendations  - Use orderset: \"Ischemic Stroke Routine Admission\" or \"Ischemic Stroke No Thrombolytics/No Thrombectomy ICU Admission\"  - Neurochecks and Vital Signs every 1 hour   - Permissive HTN; goal SBP < 220 mmHg  - low intensity heparin gtt, no loading dose or bolus  - Statin: atorvastatin 40 mg daily, adjustment pending LDL, goal 40-70  - MRI Brain with and without contrast  - TTE with bubble study  - Telemetry, EKG  - Bedside Glucose Monitoring  - Nutrition: pending SLP eval  - A1c, Lipid Panel, Troponin x " "3  - PT/OT/SLP  - Stroke Education  - Euthermia, Euglycemia    Patient Follow-up    - final recommendation pending work-up    Thank you for this consult. We will continue to follow.     The Stroke Staff is Dr. Norton.    Melissa Abbott MD  Vascular Neurology Fellow  To page me or covering stroke neurology team member, click here: AMCOM   Choose \"On Call\" tab at top, then search dropdown box for \"Neurology Adult\", select location, press Enter, then look for stroke/neuro ICU/telestroke.    _____________________________________________________    Past Medical History   Past Medical History:   Diagnosis Date     Chronic atrial fibrillation -- on Eliquis      Gastric AVM's with bleeding 6/16/21      History of recurrent UTIs      History of strokes     Right frontal previous, left Cerebellar 4/14/21     S/P AVR (Bioprosthetic) and Ascending Aorta tube graft      Past Surgical History   Past Surgical History:   Procedure Laterality Date     REPAIR ANEURYSM ASCENDING AORTA      tube graft     REPLACE VALVE AORTIC       Medications   Home Meds  Prior to Admission medications    Not on File       Scheduled Meds      Infusion Meds      PRN Meds      Allergies   No Known Allergies  Family History   Family History   Problem Relation Age of Onset     Cerebrovascular Disease Mother      Other - See Comments Father         MVA, then suicide     Social History   Social History     Tobacco Use     Smoking status: Never Smoker   Substance Use Topics     Alcohol use: Not Currently     Drug use: Not on file       Review of Systems   Review of systems not obtained due to patient factors - mental status       PHYSICAL EXAMINATION   Temp:  [97.4  F (36.3  C)-98.4  F (36.9  C)] 97.4  F (36.3  C)  Pulse:  [73-87] 87  Resp:  [8-15] 15  BP: ()/(50-78) 134/78  SpO2:  [100 %] 100 %    General:  patient lying in bed without any acute distress    HEENT:  normocephalic/atraumatic, edentulous  Cardio:  irregularly irregular  Pulmonary:  " no respiratory distress  Abdomen:  soft, non-tender, non-distended  Extremities:  no edema, peripheral pulses palpable  Skin:  intact, warm/dry     Neurologic  Mental Status:  eyes closed, does not open to voice, requires repetitive verbal and tactile stimulation to maintain brief alertness, oriented to self and hospital, but not time or circumstance. Language is sparse but fluent, follows simple commands.  Cranial Nerves:  does not blink to threat, pupils 2 mm, round, minimally reactive, dysconjugate gaze at rest, L eye exotropia, right facial droop, moderate dysarthria  Motor:  normal muscle tone and bulk, no abnormal movements, able to move all limbs spontaneously, variable strength BUEs related to wakefulness, arms drift symmetrically unless continuously sternal rubbed, no antigravity strength BLEs, wiggles toes readily  Reflexes:  no clonus, toes mute  Sensory:  detects noxious in 4/4 extremities  Coordination:  not ataxia of observable movements  Station/Gait:  not tested    Dysphagia Screen  Dysarthria or facial droop present - Maintain NPO, consult SLP    Stroke Scales    NIHSS  Interval transfer (06/26/21 1433)   Interval Comments     1a. Level of Consciousness 2-->Not alert, requires repeated stimulation to attend, or is obtunded and requires strong or painful stimulation to make movements (not stereotyped)   1b. LOC Questions 2-->Answers neither question correctly   1c. LOC Commands 0-->Performs both tasks correctly   2.   Best Gaze 1-->Partial gaze palsy, gaze is abnormal in one or both eyes, but forced deviation or total gaze paresis is not present   3.   Visual 3-->Bilateral hemianopia (blind including cortical blindness)   4.   Facial Palsy 2-->Partial paralysis (total or near-total paralysis of lower face)   5a. Motor Arm, Left 1-->Drift, limb holds 90 (or 45) degrees, but drifts down before full 10 seconds, does not hit bed or other support   5b. Motor Arm, Right 1-->Drift, limb holds 90 (or 45)  degrees, but drifts down before full 10 secs, does not hit bed or other support   6a. Motor Leg, Left 3-->No effort against gravity, leg falls to bed immediately   6b. Motor Leg, right 3-->No effort against gravity, leg falls to bed immediately   7.   Limb Ataxia 0-->Absent   8.   Sensory 0-->Normal, no sensory loss   9.   Best Language 0-->No aphasia, normal   10. Dysarthria 2-->Severe dysarthria, patients speech is so slurred as to be unintelligible in the absence of or out of proportion to any dysphasia, or is mute/anarthric   11. Extinction and Inattention  0-->No abnormality   Total 20 (06/26/21 1433)       Imaging  I personally reviewed all imaging; relevant findings per HPI.    Labs Data   CBC  Recent Labs   Lab 06/26/21  1531   WBC 9.8   RBC 3.07*   HGB 8.0*   HCT 26.6*        Basic Metabolic Panel   Recent Labs   Lab 06/26/21  1531      POTASSIUM 3.6   CHLORIDE 114*   CO2 21   BUN 16   CR 1.41*   GLC 88   MANDI 7.5*     Liver Panel  No results for input(s): PROTTOTAL, ALBUMIN, BILITOTAL, ALKPHOS, AST, ALT, BILIDIRECT in the last 168 hours.  INR  No lab results found.   Lipid Profile  No lab results found.  A1C  No lab results found.  Troponin I  No results for input(s): TROPI in the last 168 hours.       Stroke Code / Stroke Consult Data Data This was a non-emergent, non-tele stroke consult.

## 2021-06-26 NOTE — PHARMACY-ADMISSION MEDICATION HISTORY
Pharmacy Medication History  Admission medication history interview status for the 6/26/2021  admission is complete. See EPIC admission navigator for prior to admission medications     Location of Interview: Patient room  Medication history sources: Patient's family/friend (family who helps manage medications at home) and Patient's home med list    Significant changes made to the medication list:  -Added all medications    In the past week, patient estimated taking medication this percent of the time: greater than 90%    Additional medication history information:   -She had been on laxative but stopped due to diarrhea    Medication reconciliation completed by provider prior to medication history? No    Time spent in this activity: 10 min    Prior to Admission medications    Medication Sig Last Dose Taking? Auth Provider   acetaminophen (TYLENOL) 325 MG tablet Take 650 mg by mouth every 8 hours as needed for mild pain Past Month at seldom Yes Unknown, Entered By History   apixaban ANTICOAGULANT (ELIQUIS) 5 MG tablet Take 5 mg by mouth 2 times daily 6/25/2021 at pm Yes Unknown, Entered By History   atorvastatin (LIPITOR) 40 MG tablet Take 40 mg by mouth At Bedtime 6/25/2021 at pm Yes Unknown, Entered By History   cyanocobalamin (VITAMIN B-12) 1000 MCG tablet Take 1,000 mcg by mouth daily 6/25/2021 at am Yes Unknown, Entered By History   ferrous sulfate (FEROSUL) 325 (65 Fe) MG tablet Take 325 mg by mouth three times a week Mon - Wed - Fri. Takes with orange juice. 6/25/2021 Yes Unknown, Entered By History   folic acid (FOLVITE) 400 MCG tablet Take 2,000 mcg by mouth daily 400 mcg x 5 tablets for 1 month then on 7/18/21 decrease to 400 mcg daily 6/25/2021 at am Yes Unknown, Entered By History   gabapentin (NEURONTIN) 300 MG capsule Take 300 mg by mouth At Bedtime 6/25/2021 at pm Yes Unknown, Entered By History   latanoprost (XALATAN) 0.005 % ophthalmic solution Place 1 drop into both eyes At Bedtime 6/25/2021 at pm Yes  Unknown, Entered By History   metoprolol tartrate (LOPRESSOR) 25 MG tablet Take 12.5 mg by mouth 2 times daily 6/25/2021 at pm Yes Unknown, Entered By History   mirtazapine (REMERON) 15 MG Solutab Take 15 mg by mouth At Bedtime 6/25/2021 at pm Yes Unknown, Entered By History   omeprazole (PRILOSEC) 20 MG DR capsule Take 20 mg by mouth daily 6/25/2021 at am Yes Unknown, Entered By History   ondansetron (ZOFRAN-ODT) 4 MG ODT tab Take 4 mg by mouth every morning 6/25/2021 at am Yes Unknown, Entered By History   ondansetron (ZOFRAN-ODT) 4 MG ODT tab Take 4 mg by mouth 2 times daily as needed for nausea Not Needed Yes Unknown, Entered By History       The information provided in this note is only as accurate as the sources available at the time of update(s)

## 2021-06-26 NOTE — PLAN OF CARE
Mentation waxes and wanes, somnolent to alert and oriented. Neuros charted, see flowsheet. Tele Afib CVR. BP unremarkable. LS diminished and coarse, Denies any pain or SOB.Afebrile. Denies pain. NPO. Purewick patent. Wounds documented on admission. Heparin gtts started. US for LUE edema. Family at bedside, Supportive. Nursing to follow closely.

## 2021-06-26 NOTE — PROGRESS NOTES
Neuro CC at bedside for assessment.. Acute LOC change. Stat head CTA ordered. Family at bedside. Questions encouraged and answered. Nursing to follow closely

## 2021-06-27 ENCOUNTER — APPOINTMENT (OUTPATIENT)
Dept: SPEECH THERAPY | Facility: CLINIC | Age: 86
DRG: 288 | End: 2021-06-27
Attending: INTERNAL MEDICINE
Payer: MEDICARE

## 2021-06-27 ENCOUNTER — APPOINTMENT (OUTPATIENT)
Dept: ULTRASOUND IMAGING | Facility: CLINIC | Age: 86
DRG: 288 | End: 2021-06-27
Attending: INTERNAL MEDICINE
Payer: MEDICARE

## 2021-06-27 ENCOUNTER — APPOINTMENT (OUTPATIENT)
Dept: GENERAL RADIOLOGY | Facility: CLINIC | Age: 86
DRG: 288 | End: 2021-06-27
Attending: INTERNAL MEDICINE
Payer: MEDICARE

## 2021-06-27 LAB
ANION GAP SERPL CALCULATED.3IONS-SCNC: 9 MMOL/L (ref 3–14)
APTT PPP: 106 SEC (ref 22–37)
APTT PPP: 56 SEC (ref 22–37)
APTT PPP: 59 SEC (ref 22–37)
BUN SERPL-MCNC: 15 MG/DL (ref 7–30)
CALCIUM SERPL-MCNC: 7.7 MG/DL (ref 8.5–10.1)
CHLORIDE SERPL-SCNC: 115 MMOL/L (ref 94–109)
CHOLEST SERPL-MCNC: 65 MG/DL
CO2 SERPL-SCNC: 20 MMOL/L (ref 20–32)
CREAT SERPL-MCNC: 1.32 MG/DL (ref 0.52–1.04)
ERYTHROCYTE [DISTWIDTH] IN BLOOD BY AUTOMATED COUNT: 16.4 % (ref 10–15)
GFR SERPL CREATININE-BSD FRML MDRD: 36 ML/MIN/{1.73_M2}
GLUCOSE BLDC GLUCOMTR-MCNC: 102 MG/DL (ref 70–99)
GLUCOSE BLDC GLUCOMTR-MCNC: 116 MG/DL (ref 70–99)
GLUCOSE BLDC GLUCOMTR-MCNC: 67 MG/DL (ref 70–99)
GLUCOSE SERPL-MCNC: 67 MG/DL (ref 70–99)
HCT VFR BLD AUTO: 28.6 % (ref 35–47)
HDLC SERPL-MCNC: 18 MG/DL
HGB BLD-MCNC: 8.7 G/DL (ref 11.7–15.7)
LDLC SERPL CALC-MCNC: 31 MG/DL
MCH RBC QN AUTO: 25.7 PG (ref 26.5–33)
MCHC RBC AUTO-ENTMCNC: 30.4 G/DL (ref 31.5–36.5)
MCV RBC AUTO: 85 FL (ref 78–100)
MRSA DNA SPEC QL NAA+PROBE: NEGATIVE
NONHDLC SERPL-MCNC: 47 MG/DL
PLATELET # BLD AUTO: 253 10E9/L (ref 150–450)
POTASSIUM SERPL-SCNC: 3.3 MMOL/L (ref 3.4–5.3)
POTASSIUM SERPL-SCNC: 3.3 MMOL/L (ref 3.4–5.3)
PROCALCITONIN SERPL-MCNC: 0.56 NG/ML
RBC # BLD AUTO: 3.38 10E12/L (ref 3.8–5.2)
SODIUM SERPL-SCNC: 144 MMOL/L (ref 133–144)
SPECIMEN SOURCE: NORMAL
TRIGL SERPL-MCNC: 80 MG/DL
WBC # BLD AUTO: 12.1 10E9/L (ref 4–11)

## 2021-06-27 PROCEDURE — 999N001017 HC STATISTIC GLUCOSE BY METER IP

## 2021-06-27 PROCEDURE — 36415 COLL VENOUS BLD VENIPUNCTURE: CPT | Performed by: INTERNAL MEDICINE

## 2021-06-27 PROCEDURE — 85730 THROMBOPLASTIN TIME PARTIAL: CPT | Performed by: STUDENT IN AN ORGANIZED HEALTH CARE EDUCATION/TRAINING PROGRAM

## 2021-06-27 PROCEDURE — 85730 THROMBOPLASTIN TIME PARTIAL: CPT | Performed by: INTERNAL MEDICINE

## 2021-06-27 PROCEDURE — 250N000013 HC RX MED GY IP 250 OP 250 PS 637: Performed by: INTERNAL MEDICINE

## 2021-06-27 PROCEDURE — 85027 COMPLETE CBC AUTOMATED: CPT | Performed by: INTERNAL MEDICINE

## 2021-06-27 PROCEDURE — 36415 COLL VENOUS BLD VENIPUNCTURE: CPT | Performed by: OBSTETRICS & GYNECOLOGY

## 2021-06-27 PROCEDURE — 85730 THROMBOPLASTIN TIME PARTIAL: CPT | Performed by: OBSTETRICS & GYNECOLOGY

## 2021-06-27 PROCEDURE — 93971 EXTREMITY STUDY: CPT | Mod: LT

## 2021-06-27 PROCEDURE — 250N000013 HC RX MED GY IP 250 OP 250 PS 637: Performed by: STUDENT IN AN ORGANIZED HEALTH CARE EDUCATION/TRAINING PROGRAM

## 2021-06-27 PROCEDURE — 258N000003 HC RX IP 258 OP 636: Performed by: INTERNAL MEDICINE

## 2021-06-27 PROCEDURE — 84145 PROCALCITONIN (PCT): CPT | Performed by: INTERNAL MEDICINE

## 2021-06-27 PROCEDURE — 87641 MR-STAPH DNA AMP PROBE: CPT | Performed by: INTERNAL MEDICINE

## 2021-06-27 PROCEDURE — 93970 EXTREMITY STUDY: CPT

## 2021-06-27 PROCEDURE — 87640 STAPH A DNA AMP PROBE: CPT | Performed by: INTERNAL MEDICINE

## 2021-06-27 PROCEDURE — 84132 ASSAY OF SERUM POTASSIUM: CPT | Performed by: INTERNAL MEDICINE

## 2021-06-27 PROCEDURE — 92610 EVALUATE SWALLOWING FUNCTION: CPT | Mod: GN

## 2021-06-27 PROCEDURE — C9113 INJ PANTOPRAZOLE SODIUM, VIA: HCPCS | Performed by: INTERNAL MEDICINE

## 2021-06-27 PROCEDURE — 80048 BASIC METABOLIC PNL TOTAL CA: CPT | Performed by: INTERNAL MEDICINE

## 2021-06-27 PROCEDURE — 80061 LIPID PANEL: CPT | Performed by: INTERNAL MEDICINE

## 2021-06-27 PROCEDURE — 250N000011 HC RX IP 250 OP 636: Performed by: INTERNAL MEDICINE

## 2021-06-27 PROCEDURE — 99291 CRITICAL CARE FIRST HOUR: CPT | Performed by: INTERNAL MEDICINE

## 2021-06-27 PROCEDURE — 71045 X-RAY EXAM CHEST 1 VIEW: CPT

## 2021-06-27 PROCEDURE — 250N000009 HC RX 250: Performed by: INTERNAL MEDICINE

## 2021-06-27 PROCEDURE — 99291 CRITICAL CARE FIRST HOUR: CPT | Mod: GC | Performed by: PSYCHIATRY & NEUROLOGY

## 2021-06-27 PROCEDURE — 200N000001 HC R&B ICU

## 2021-06-27 PROCEDURE — 92526 ORAL FUNCTION THERAPY: CPT | Mod: GN

## 2021-06-27 RX ORDER — ATORVASTATIN CALCIUM 40 MG/1
40 TABLET, FILM COATED ORAL EVERY EVENING
Status: DISCONTINUED | OUTPATIENT
Start: 2021-06-27 | End: 2021-06-27

## 2021-06-27 RX ORDER — POTASSIUM CHLORIDE 1500 MG/1
20 TABLET, EXTENDED RELEASE ORAL ONCE
Status: COMPLETED | OUTPATIENT
Start: 2021-06-27 | End: 2021-06-27

## 2021-06-27 RX ORDER — ATORVASTATIN CALCIUM 20 MG/1
20 TABLET, FILM COATED ORAL EVERY EVENING
Status: DISCONTINUED | OUTPATIENT
Start: 2021-06-27 | End: 2021-07-08 | Stop reason: HOSPADM

## 2021-06-27 RX ADMIN — POTASSIUM CHLORIDE 20 MEQ: 1500 TABLET, EXTENDED RELEASE ORAL at 11:46

## 2021-06-27 RX ADMIN — SODIUM CHLORIDE: 9 INJECTION, SOLUTION INTRAVENOUS at 21:33

## 2021-06-27 RX ADMIN — ATORVASTATIN CALCIUM 20 MG: 20 TABLET, FILM COATED ORAL at 20:47

## 2021-06-27 RX ADMIN — METOPROLOL TARTRATE 2.5 MG: 5 INJECTION INTRAVENOUS at 17:47

## 2021-06-27 RX ADMIN — METOPROLOL TARTRATE 2.5 MG: 5 INJECTION INTRAVENOUS at 06:15

## 2021-06-27 RX ADMIN — PANTOPRAZOLE SODIUM 40 MG: 40 INJECTION, POWDER, FOR SOLUTION INTRAVENOUS at 20:47

## 2021-06-27 RX ADMIN — POTASSIUM CHLORIDE 20 MEQ: 1500 TABLET, EXTENDED RELEASE ORAL at 17:48

## 2021-06-27 RX ADMIN — PIPERACILLIN SODIUM AND TAZOBACTAM SODIUM 2.25 G: 2; .25 INJECTION, POWDER, LYOPHILIZED, FOR SOLUTION INTRAVENOUS at 09:11

## 2021-06-27 RX ADMIN — PIPERACILLIN SODIUM AND TAZOBACTAM SODIUM 2.25 G: 2; .25 INJECTION, POWDER, LYOPHILIZED, FOR SOLUTION INTRAVENOUS at 03:39

## 2021-06-27 RX ADMIN — PIPERACILLIN SODIUM AND TAZOBACTAM SODIUM 2.25 G: 2; .25 INJECTION, POWDER, LYOPHILIZED, FOR SOLUTION INTRAVENOUS at 20:59

## 2021-06-27 RX ADMIN — SODIUM CHLORIDE: 9 INJECTION, SOLUTION INTRAVENOUS at 06:14

## 2021-06-27 RX ADMIN — PANTOPRAZOLE SODIUM 40 MG: 40 INJECTION, POWDER, FOR SOLUTION INTRAVENOUS at 09:11

## 2021-06-27 RX ADMIN — PIPERACILLIN SODIUM AND TAZOBACTAM SODIUM 2.25 G: 2; .25 INJECTION, POWDER, LYOPHILIZED, FOR SOLUTION INTRAVENOUS at 15:34

## 2021-06-27 RX ADMIN — METOPROLOL TARTRATE 2.5 MG: 5 INJECTION INTRAVENOUS at 11:46

## 2021-06-27 ASSESSMENT — ACTIVITIES OF DAILY LIVING (ADL)
ADLS_ACUITY_SCORE: 27
ADLS_ACUITY_SCORE: 25
ADLS_ACUITY_SCORE: 24
ADLS_ACUITY_SCORE: 27

## 2021-06-27 ASSESSMENT — MIFFLIN-ST. JEOR: SCORE: 1178.38

## 2021-06-27 NOTE — PROGRESS NOTES
"Clinical Swallow Evaluation: \    Dysphagia diet level 2, nectar thick liquids, when alert/upright, slow pacing, monitor for oral residuals given R sided weakness. Pt without dentures present, if difficulty with DD2, please downgrade to DD1. SLP to clinically monitor swallow function/tolerance, will monitor need for VFSS.     06/27/21 1108   General Information   Onset of Illness/Injury or Date of Surgery 06/26/21   Referring Physician Dr. Neves   Patient/Family Therapy Goal Statement (SLP) To eat/drink   Pertinent History of Current Problem   Pt admitted with new R facial droop. Found to have basilar artery thrombosis, fever. MRI not yet completed this admission.     PMHx + for multiple strokes within this past year. MRI 2/2021 \"Multifocal acute/early subacute infarcts involving the cerebral hemispheres, left basal ganglia, left thalamus, left joey, and bilateral cerebellar hemispheres. ischemic change in the right frontal lobe and left cerebellar hemisphere, most consistent with petechial hemorrhage. Chronic infarcts in the left occipital lobe and right cerebellar hemisphere.\" and MRI 4/8/21 \"new acute or early subacute infarct in the medial left cerebellum. Evolving subacute to chronic right frontal infarct\"     SLP seen at outside hospital 4/9/21 and cleared pt for a regular/thin. Pt has been on a regular/thin diet at home per her report.      General Observations Pt alert, cooperative, dysarthria   Type of Evaluation   Type of Evaluation Swallow Evaluation   Oral Motor   Oral Musculature anomalies present   Structural Abnormalities none present   Mucosal Quality good   Dentition (Oral Motor)   Dentition (Oral Motor) dental appliance/dentures  (upper/lower but at home, not present)   Facial Symmetry (Oral Motor)   Facial Symmetry (Oral Motor) right side impairment   Right Side Facial Asymmetry moderate impairment;severe impairment   Lip Function (Oral Motor)   Lip Range of Motion (Oral Motor) protrusion " impairment;retraction impairment   Protrusion, Lip Range of Motion right side;moderate impairment   Retraction, Lip Range of Motion right side;moderate impairment   Lip Strength (Oral Motor) right side;moderate impairment   Tongue Function (Oral Motor)   Tongue ROM (Oral Motor) elevation is impaired;protrusion is impaired;lateralization is impaired   Elevation, Tongue ROM Impairment (Oral Motor) bilateral;minimal impairment   Protrusion, Tongue ROM Impairment (Oral Motor) right side;moderate impairment   Lateralization, Tongue ROM Impairment (Oral Motor) left side;right side;minimal impairment;moderate impairment   Tongue Strength (Oral Motor) bilateral;minimal impairment;moderate impairment   Jaw Function (Oral Motor)   Jaw Function (Oral Motor) WNL   Cough/Swallow/Gag Reflex (Oral Motor)   Soft Palate/Velum (Oral Motor) elevation decreased bilaterally   Volitional Throat Clear/Cough (Oral Motor) reduced strength   Volitional Swallow (Oral Motor) weak   Vocal Quality/Secretion Management (Oral Motor)   Vocal Quality (Oral Motor) WNL   Secretion Management (Oral Motor) WNL   General Swallowing Observations   Current Diet/Method of Nutritional Intake (General Swallowing Observations, NIS) NPO   Respiratory Support (General Swallowing Observations) none   Swallowing Evaluation Clinical swallow evaluation   Clinical Swallow Evaluation   Feeding Assistance minimal assistance required   Clinical Swallow Evaluation Textures Trialed Thin Liquids;Nectar-Thick Liquids;Puree Textures;Semi-Solid   Clinical Swallow Eval: Thin Liquid Texture Trial   Mode of Presentation, Thin Liquids cup;straw;self-fed   Volume of Liquid or Food Presented 3 oz   Oral Phase of Swallow Premature pharyngeal entry   Pharyngeal Phase of Swallow impaired;reduction in laryngeal movement;coughing/choking   Diagnostic Statement mild anterior spillage, cough x1 with aspiration sensation per pt, increased WOB/RR   Clinical Swallow Evaluation: [place]-Thick  Liquid Texture Trial   Mode of Presentation, Nectar cup;straw;self-fed   Volume of Nectar Presented 4 oz   Oral Phase, Nectar Premature pharyngeal entry   Pharyngeal Phase, Nectar impaired;reduction in laryngeal movement   Diagnostic Statement minimal anterior spillage x1, no overt clinical signs/sx aspiration noted.   Clinical Swallow Evaluation: Puree Solid Texture Trial   Mode of Presentation, Puree spoon;self-fed   Volume of Puree Presented 3 oz   Oral Phase, Puree WFL   Pharyngeal Phase, Puree reduction in laryngeal movement   Diagnostic Statement no overt clinical signs/sx aspiration   Clinical Swallow Evaluation: Semisolid Texture Trial   Mode of Presentation, Semisolid self-fed   Volume of Semisolid Food Presented 3 bites   Oral Phase, Semisolid WFL   Pharyngeal Phase, Semisolid reduction in laryngeal movement   Diagnostic Statement no overt clinical signs/sx aspiration   Swallowing Recommendations   Diet Consistency Recommendations dysphagia level 2 (mechanically altered);nectar-thick liquids   Supervision Level for Intake close supervision needed   Mode of Delivery Recommendations slow rate of intake   Swallowing Maneuver Recommendations alternate food and liquid intake   Monitoring/Assistance Required (Eating/Swallowing) monitor for cough or change in vocal quality with intake;stop eating activities when fatigue is present;cue for finger/lingual sweep if oral pocketing present   Recommended Feeding/Eating Techniques (Swallow Eval) maintain upright sitting position for eating;maintain upright posture during/after eating for 30 minutes;provide assist with feeding   Medication Administration Recommendations, Swallowing (SLP) trial whole, crush if needed   Instrumental Assessment Recommendations   (clinically monitor need for VFSS)   General Therapy Interventions   Planned Therapy Interventions Dysphagia Treatment   SLP Therapy Assessment/Plan   Criteria for Skilled Therapeutic Interventions Met (SLP Eval)  yes;treatment indicated   SLP Diagnosis mild-moderate oral and supsected pharyngeal dysphagia    Rehab Potential (SLP Eval) good, to achieve stated therapy goals   Therapy Frequency (SLP Eval) daily   Predicted Duration of Therapy Intervention (SLP Eval) 1 week   Comment, Therapy Assessment/Plan (SLP)   Pt presents with mild-moderate oral and supsected pharyngeal dysphagia on clinical swallow evaluation. R sided labial/facial droop/weakness with lingual weakness/deviation to the R  upon protrusion; also reduced lingual ROM to L side as well. Baseline = regular/thin diet. Pt has upper and lower dentures at home, not present at hospital currently, always eats with them in. Reduced labial seal around cup/straw resulting in mild anteiror bolus spillage with thin, minimal x1 with nectar. Suspect reduced bolus control/ premature bolus spillage with liquids. Adequate oral manipulation with puree, soft solids; further chewable solids not trialed given dentition. Laryngeal elevation reduced to palpation. Overt cough x1 with thin liquids, mild increased WOB/RR with thin liquids - no overt signs/sx aspiraiton with nectar, puree, semi-solids.      Therapy Plan Review/Discharge Plan (SLP)   Therapy Plan Review (SLP) evaluation/treatment results reviewed;care plan/treatment goals reviewed;risks/benefits reviewed;current/potential barriers reviewed;participants voiced agreement with care plan;participants included;patient   SLP Discharge Planning    SLP Discharge Recommendation (DC Rec) Transitional Care Facility   SLP Rationale for DC Rec Pt below baseline re: swallow and speech intelligibility   SLP Brief overview of current status  Dysphagia diet level 2, nectar thick liquids, when alert/upright, slow pacing, monitor for oral residuals given R sided weakness.     Total Evaluation Time   Total Evaluation Time (Minutes) 29

## 2021-06-27 NOTE — PROGRESS NOTES
MD notified of +BC per call from [facility] MD, RN unable to see results in chart.. Nursing to follow closely

## 2021-06-27 NOTE — PROGRESS NOTES
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 , S/P bioprosthetic AVR and Ascending Aorta tube graft for aneurysm, recurrent UTI's -- wheelchair bound, presented to [facility] ER this AM after waking up with new right face droop, and transferred to [facility]:       Acute Ischemic Stroke secondary    Basilar artery thrombosis --  This is a 89 year old female, history of Afib not recently on Eliquis because of recent GI bleeding, presented with right facial droop,  Initial CT head negative for acute stroke, CTA head and neck done shows new filling defect within the basilar artery, concerning for partially occlusive thrombus.No evidence of significant stenosis in either internal carotid arteries.     No invasive intervention recommended by the Neurology, started on low intensity IV Heparin, strict bed rest, every 1 hour neuro check. ICU monitoring, permissive Hypertension.     At this time will order Echocardiogram with bubble study, check Lipid panel, start on Lipitor 40 mg daily. Speech, PT and OT consulted to evaluate. Most likely is cardio embolic. Repeat CT head shows Possible subacute ischemic infarct in the right frontal and left occipital lobe, CTA head shows nonocclusive thrombus at the proxima basilar artery remain stable. Continue with IV heparin at this time. MRI as per Neurology.                     Permanent atrial fib --               -- She is on Metoprolol, on hold given NPO pending Speech clearance.   She is on IV Metoprolol 2.5 mg every 6 hr for rate control.   Eliquis was on hold before admission because of GI bleed, currently on IV Heparin.     Possible Community acquired Pneumonia vs Aspiration Pneumonia.   She reportedly had fever of 101.5 at  outside hospital, her Xray chest on admission shows patchy bibasilar opacities suspicious for Pneumonic infiltrate. She was started on IV Zosyn,  Agree with that.   Patient is now Afebrile, will check Pro calcitonin and repeat the Xray chest now.   Check MRSA screen, no need for Vancomycin if screen negative.        Acute Renal Failure.   Patient has recently elevated creatinine in the range of 1.2-1.4, but in may of 2021 and march of 2021 she has normal creatinine. Most likely develop ARF during her admission with GI bleeding and likely in ATN at this time.   Keep her on IV fluids NS at 75 ml/hr as received IV contrast twice for CTA head and neck and perfusion,.   Creatinine on admission was 1.5 now improve to 1.32 at this time. Continue to monitor.   Avoid NSAID's and nephrotoxic medications.        Chronic Anemia   H/O Recent Upper GI bleeding secondary to Angio ectasia   Patient appears to have chronic anemia with recent acute on chronic anemia secondary to GI bleeding because of   duodenal angioectasia s/p clip placement. Her Hb is now stable. She is tolerating IV heparin and Hb improve to 8.7 today .   She is currently on IV Protonix 40 bid, agree with that.          History of stroke   prior old Right frontal, and recently left Cerebellar on 4/8/21, and unable to walk related to left leg weakness  Wheel chair bound.        Benign essential hypertension  Reasonably control at this time. On Metoprolol 12.5 mg bid at home.   Currently on IV metoprolol given NPO will transition to oral once clear by the speech.           S/P AVR & Ascending Aortic Aneur repair   Stable.    Bilateral Lower Extremity edema and Weakness.   She has 3+ LE edema, most likely chronic.   US Venous Bilateral order to rule out DVT  echocardiogram ordered.        Start on Lipitor 40 mg daily  Echocardiogram with bubble study  US LE for LE edema.   Neuro check every 1 hr  Speech need to evaluate her.   Check Pro calcitonin and MRSA  screen  Repeat Xray chest today      Discussed with patient and the nursing staff taking care of the patient today       total time spend today is 30 min of critical care time which include history taking, examination, chart review, formulation of the plan, counseling and coordination of care.     DVT Prophylaxis: Heparin IV  Code Status: No CPR- Do NOT Intubate    Disposition: Expected discharge TBD    Jean Grubbs MD  Text Page  (7am - 6pm)    Interval History   Patient was sleeping but woke up easily, feeling much better now, had good night sleep, denies any headache, dizziness, fever, chills, chest pain, SOB, nausea, vomiting, abdominal pain or back pain at this time.    No other significant event overnight.     -Data reviewed today: I reviewed all new labs and imaging results over the last 24 hours. I personally reviewed no images or EKG's today.    Physical Exam   Temp: 98.1  F (36.7  C) Temp src: Oral BP: 123/72 Pulse: 111   Resp: 14 SpO2: 95 % O2 Device: None (Room air) Oxygen Delivery: 1 LPM  Vitals:    06/26/21 1600 06/27/21 0700   Weight: 74.8 kg (164 lb 14.5 oz) 81.6 kg (179 lb 14.3 oz)     Vital Signs with Ranges  Temp:  [97.4  F (36.3  C)-98.4  F (36.9  C)] 98.1  F (36.7  C)  Pulse:  [] 111  Resp:  [8-19] 14  BP: ()/(50-96) 123/72  SpO2:  [93 %-100 %] 95 %  I/O last 3 completed shifts:  In: 1214.05 [I.V.:1214.05]  Out: 750 [Urine:750]    Constitutional: awake, alert, cooperative, no apparent distress, and appears stated age  Eyes: Lids and lashes normal, pupils equal, round and reactive to light, extra ocular muscles intact, sclera clear, conjunctiva normal  Respiratory: No increased work of breathing, good air exchange, clear to auscultation bilaterally, no crackles or wheezing  Cardiovascular: Normal apical impulse, regular rate and rhythm, normal S1 and S2, no S3 or S4, and no murmur noted  GI: No scars, normal bowel sounds, soft, non-distended, non-tender, no masses palpated, no  hepatosplenomegally  Musculoskeletal: 3+ bilateral  lower extremity pitting edema present  Neurologic: awake, alert and following command, has right facial droop,  Moving both upper extremities, weakness of both lower extremities.     Medications     heparin 600 Units/hr (06/27/21 0600)     - MEDICATION INSTRUCTIONS -       sodium chloride 75 mL/hr at 06/27/21 0614       metoprolol  2.5 mg Intravenous Q6H     pantoprazole (PROTONIX) IV  40 mg Intravenous BID     piperacillin-tazobactam  2.25 g Intravenous Q6H     sodium chloride 0.9 %  100 mL As instructed Once     sodium chloride (PF)  3 mL Intracatheter Q8H       Data   Recent Labs   Lab 06/27/21  0725 06/26/21  1531   WBC 12.1* 9.8   HGB 8.7* 8.0*   MCV 85 87    188    139   POTASSIUM 3.3* 3.6   CHLORIDE 115* 114*   CO2 20 21   BUN 15 16   CR 1.32* 1.41*   ANIONGAP 9 4   MANDI 7.7* 7.5*   GLC 67* 88       Recent Results (from the past 24 hour(s))   CT Head w/o Contrast    Narrative    CT OF THE HEAD WITHOUT CONTRAST 6/26/2021 3:10 PM     COMPARISON: None available    HISTORY: Neurologic deficit, acute, stroke suspected. Known partially  occlusive basilar thrombus.    TECHNIQUE: 5 mm thick axial CT images of the head were acquired  without IV contrast material.    FINDINGS: There is mild diffuse cerebral volume loss. There are subtle  patchy areas of decreased density in the cerebral white matter  bilaterally that are consistent with sequela of chronic small vessel  ischemic disease. There are small areas of hypodensity in the high  posterolateral right frontal lobe (series 3, image 22) and in the  posteromedial left occipital lobe that could represent subacute  ischemic infarct.    The ventricles and basal cisterns are within normal limits in  configuration given the degree of cerebral volume loss.  There is no  midline shift. There are no extra-axial fluid collections.    No intracranial hemorrhage, mass or recent infarct.    The visualized paranasal  sinuses are well-aerated. There is no  mastoiditis. There are no fractures of the visualized bones.      Impression    IMPRESSION:   1. Possible subacute ischemic infarcts in the high right frontal and  left occipital lobes. Brain MRI may be helpful for further evaluation.    2. Diffuse cerebral volume loss and cerebral white matter changes  consistent with chronic small vessel ischemic disease.        Radiation dose for this scan was reduced using automated exposure  control, adjustment of the mA and/or kV according to patient size, or  iterative reconstruction technique    SMITHA WEST MD   CTA Head Neck with Contrast    Narrative    CT ANGIOGRAM OF THE HEAD AND NECK WITHOUT AND WITH CONTRAST  6/26/2021  3:12 PM     COMPARISON: None available    HISTORY: Stroke/transient ischemic attack, assess intracranial  arteries. Known partially occlusive basilar thrombus, more somnolent.    TECHNIQUE:  Precontrast localizing scans were followed by CT  angiography with an injection of 70mL Isovue-370 nonionic intravenous  contrast material with scans through the head and neck.  Images were  transferred to a separate 3-D workstation where multiplanar  reformations and 3-D images were created.  Estimates of carotid  stenoses are made relative to the distal internal carotid artery  diameters except as noted.      FINDINGS:   Neck CTA: The common carotid arteries bilaterally are patent without  vascular narrowing. The cervical internal carotid arteries bilaterally  are tortuous but are patent without vascular narrowing. The vertebral  arteries bilaterally are tortuous but are patent without vascular  narrowing.    Head CTA: There is a filling defect at the confluence between the  right vertebral artery and basilar artery consistent with the history  of nonocclusive basilar artery thrombus. The basilar artery is  otherwise patent and unremarkable. The bilateral intracranial distal  internal carotid, bilateral anterior cerebral,  bilateral middle  cerebral and bilateral posterior cerebral arteries are patent and  unremarkable. The anterior communicating artery is patent.      Impression    IMPRESSION:  1. Presumed nonocclusive thrombus at the proximal basilar artery is  noted consistent with patient's history of partially occlusive basilar  thrombus.  2. Otherwise, normal neck and head CTA.      Radiation dose for this scan was reduced using automated exposure  control, adjustment of the mA and/or kV according to patient size, or  iterative reconstruction technique    SMITHA WEST MD   CT Head Perfusion w Contrast    Narrative    CT BRAIN PERFUSION 6/26/2021 3:14 PM    COMPARISON: None    HISTORY: Neurologic deficit, acute, stroke suspected.    TECHNIQUE: Time sequential axial CT images of the head were acquired  during the administration of intravenous contrast (50mL Isovue-370).  CTA images of the [place] as well as color perfusion maps of  the brain were created from this time sequential axial source data.      Impression    IMPRESSION: There is delayed arrival of the contrast bolus to the left  PICA distribution along the undersurface of the left cerebellar  hemisphere that results in decreased cerebral blood flow but no  significant alteration of cerebral blood volume. No other perfusion  defects.    Radiation dose for this scan was reduced using automated exposure  control, adjustment of the mA and/or kV according to patient size, or  iterative reconstruction technique    SMITHA WEST MD   US Upper Extremity Venous Duplex Left    Narrative    ULTRASOUND UPPER EXTREMITY VENOUS DUPLEX LEFT   6/27/2021 9:06 AM     HISTORY: Rule out deep vein thrombosis, swollen arm.    COMPARISON: None available    FINDINGS: Gray-scale, color and Doppler spectral analysis ultrasound  was performed of the left upper extremity. Compression and  augmentation imaging was performed.    No evidence of deep venous thrombosis in the left upper  extremity.       Impression    IMPRESSION: No evidence of deep venous thrombosis in the left upper  extremity.     MARIKA SUMMERS MD   XR Chest Port 1 View    Narrative    CHEST PORTABLE ONE VIEW   6/27/2021 9:36 AM     HISTORY: Stroke, history of fever, rule out pneumonia.    COMPARISON: None available      Impression    IMPRESSION: Single portable AP view of the chest was obtained.  Postsurgical changes of cardiac surgery with median sternotomy wires  and surgical clips. Mild enlargement of the cardiac silhouette. Small  left pleural effusion and associated basilar  atelectasis/consolidation. No significant right pleural effusion. No  significant pneumothorax.    MARIKA SUMMERS MD

## 2021-06-27 NOTE — PLAN OF CARE
Alert, Oriented. LOC and mentation consistency improved vs admission. Neuro's charted, See flowsheet. Tele Afib+CVR. BP unremarkable. LS diminished and coarse. On RA. Denies any Pain or SOB. Heparin gtts. K+ replaced. Hospitalist notified of +BC per Leslie AGUDELO, See note. No new orders,  Will pass along to Nights. MRI to be completed tonight. Passed SLP, Diet as ordered, Tolerated well. US to be completed in LEs. Family updated via phone and at bedside, Nursing to follow closely

## 2021-06-27 NOTE — PROGRESS NOTES
PT order acknowledged. Pt here with basilar arterial occlusion, declined thrombectomy, recent upper GI bleed limiting anticoagulation. On strict bedrest. PT on hold.

## 2021-06-27 NOTE — CONSULTS
"BRIEF NUTRITION ASSESSMENT      REASON FOR ASSESSMENT:  Ekaterina Thayer is a 89 year old female seen by Registered Dietitian for Admission Nutrition Risk Screen:  Have you recently lost weight without trying? \"unsure\"  Have you been eating poorly because of a decreased appetite? \"yes\"      NUTRITION HISTORY:  Chart reviewed  4/2021 - CVA    Visited with pt this morning  Tells me that her dtr does all the cooking  Notes that she eats 3 meals/day - \"my dtr is a pretty good cook\"  Follows a regular diet  Trying to drink more milk, likes juice  Consumes fruits/veggies, beef/chicken/fish    Has noticed that since her stroke, she has bene eating a bit less    CURRENT DIET AND INTAKE:  Diet:  NPO         SLP eval pending           ANTHROPOMETRICS:  Height: 5' 1\"  Weight:(6/27) 81.6 kg /  179 lbs 14.33 oz  Body mass index is 33.99 kg/m .   Weight Status: Obesity Grade I BMI 30-34.9  IBW:  47.7 kg  %IBW: 171%  Weight History: Pt states that her usual wt is probably in the 150s.  Doesn't feel that her clothes fit any differently.  Vitals:    06/26/21 1600 06/27/21 0700   Weight: 74.8 kg (164 lb 14.5 oz) 81.6 kg (179 lb 14.3 oz)     Per Care Everywhere:  4/14/21: 152 lbs  6/16/21: 160 lbs    LABS:  Labs noted    MALNUTRITION:  Do not suspect acute muscle/fat losses.  Patient does not meet two of the following criteria necessary for diagnosing malnutrition.     % Weight Loss:  None noted  % Intake:  Decreased intake does not meet criteria for malnutrition  - still eating 3 meals/day PTA  Subcutaneous Fat Loss:  None observed  Muscle Loss:  Temporal region - mild depletion  Fluid Retention:  None noted    NUTRITION INTERVENTION:  Nutrition Diagnosis:  No nutrition diagnosis at this time.    Implementation:  Nutrition Education ---> Reviewed NPO diet order    FOLLOW UP/MONITORING:   Will re-evaluate in 7 - 10 days, or sooner, if re-consulted.          "

## 2021-06-27 NOTE — PLAN OF CARE
Neuro: Aler and oriented, fallow commands during the night.   CV: A Fib CVR. Temp max 97.7, denies any pain.  RR: LS coarse diminish  GI/: NPO. Pure wick in place  Skin: No changes ( see flowsheets)   Vasc: R arm PIV x 2  GTT: Heparin and NS continuous  Plan: US not done writer call US x 2 but no one show up, radialogyst refuse to run MRI.

## 2021-06-27 NOTE — PLAN OF CARE
PT orders received, chart reviewed and per discussion with nursing therapy to hold until medical POC established and more determined about patient's baseline status. Noted she is wheelchair bound but no other info available as yet. Plan is likely transfer to chair sometime today therefore will hold PT and initiate tomorrow if appropriate.

## 2021-06-27 NOTE — PROGRESS NOTES
"SPIRITUAL HEALTH SERVICES Progress Note  [facility] ICU    Visited pt due to \"yes\" in SH column of pt chart. I introduced myself and SH services and pt invited me to stay for visit.    Ekaterina shared that she is \"doing good\" today and she is Sikh and that her julio gives her support and strength. She welcomed prayer and I offered prayer and words of our care and support for her. I offered reflective listening and affirmation of her feelings, experience and meaning.     SHS remains available.      Reina Street  Chaplain Resident    "

## 2021-06-27 NOTE — PROGRESS NOTES
"   [facility]    Stroke Progress Note    Interval Events  Clinically stable to improved overnight and today while on heparin gtt. MRI may be not possible due to gastric clips? Will investigate further.     Impression  1. Partially occlusive basilar artery thrombus secondary to cardioembolism from atrial fibrillation- resultant brainstem infarct, R facial droop as a result    Plan  - Okay to space neurochecks to q2 hours, remain in ICU overnight  - Goal normotension, <130/80 or lower if tolerated for overall reduced cardiovascular risk. Please titrate enteral meds to achieve. IV PRN BP meds only for SBP >180.  - Decrease atorvastatin 40 mg daily to 20 mg daily in light of LDL 31 and increased risk of ICH in LDL<40  - TTE with bubble study    The Stroke Staff is Dr. Norton.    Melissa Abbott MD  Vascular Neurology Fellow  To page me or covering stroke neurology team member, click here: [facility]COM   Choose \"On Call\" tab at top, then search dropdown box for \"Neurology Adult\", select location, press Enter, then look for stroke/neuro ICU/telestroke.    ______________________________________________________    Medications   Home Meds  Prior to Admission medications    Medication Sig Start Date End Date Taking? Authorizing Provider   acetaminophen (TYLENOL) 325 MG tablet Take 650 mg by mouth every 8 hours as needed for mild pain   Yes Unknown, Entered By History   apixaban ANTICOAGULANT (ELIQUIS) 5 MG tablet Take 5 mg by mouth 2 times daily   Yes Unknown, Entered By History   atorvastatin (LIPITOR) 40 MG tablet Take 40 mg by mouth At Bedtime   Yes Unknown, Entered By History   cyanocobalamin (VITAMIN B-12) 1000 MCG tablet Take 1,000 mcg by mouth daily   Yes Unknown, Entered By History   ferrous sulfate (FEROSUL) 325 (65 Fe) MG tablet Take 325 mg by mouth three times a week Mon - Wed - Fri. Takes with orange juice.   Yes Unknown, Entered By History   folic acid (FOLVITE) 400 MCG tablet Take 2,000 mcg " by mouth daily 400 mcg x 5 tablets for 1 month then on 7/18/21 decrease to 400 mcg daily   Yes Unknown, Entered By History   gabapentin (NEURONTIN) 300 MG capsule Take 300 mg by mouth At Bedtime   Yes Unknown, Entered By History   latanoprost (XALATAN) 0.005 % ophthalmic solution Place 1 drop into both eyes At Bedtime   Yes Unknown, Entered By History   metoprolol tartrate (LOPRESSOR) 25 MG tablet Take 12.5 mg by mouth 2 times daily   Yes Unknown, Entered By History   mirtazapine (REMERON SOL-TAB) 15 MG ODT 15 mg by Orally disintegrating tablet route At Bedtime   Yes Unknown, Entered By History   omeprazole (PRILOSEC) 20 MG DR capsule Take 20 mg by mouth daily   Yes Unknown, Entered By History   ondansetron (ZOFRAN-ODT) 4 MG ODT tab Take 4 mg by mouth every morning   Yes Unknown, Entered By History   ondansetron (ZOFRAN-ODT) 4 MG ODT tab Take 4 mg by mouth 2 times daily as needed for nausea   Yes Unknown, Entered By History       Scheduled Meds    atorvastatin  40 mg Oral QPM     metoprolol  2.5 mg Intravenous Q6H     pantoprazole (PROTONIX) IV  40 mg Intravenous BID     piperacillin-tazobactam  2.25 g Intravenous Q6H     sodium chloride 0.9 %  100 mL As instructed Once     sodium chloride (PF)  3 mL Intracatheter Q8H       Infusion Meds    heparin 600 Units/hr (06/27/21 0600)     - MEDICATION INSTRUCTIONS -       sodium chloride 75 mL/hr at 06/27/21 0614       PRN Meds  acetaminophen, lidocaine 4%, lidocaine (buffered or not buffered), melatonin, ondansetron **OR** ondansetron, - MEDICATION INSTRUCTIONS -, sodium chloride (PF)       PHYSICAL EXAMINATION  Temp:  [97.5  F (36.4  C)-98.1  F (36.7  C)] 98.1  F (36.7  C)  Pulse:  [] 82  Resp:  [9-19] 9  BP: ()/(52-96) 106/54  SpO2:  [93 %-100 %] 95 %     General:  patient lying in bed without any acute distress    HEENT:  normocephalic/atraumatic, edentulous  Cardio:  irregularly irregular  Pulmonary:  no respiratory distress  Abdomen:  soft, non-tender,  non-distended  Extremities:  no edema, peripheral pulses palpable  Skin:  intact, warm/dry      Neurologic  Mental Status:  Eyes open spontaneously, alert and attentive, oriented to self, time, place and circumstnce and hospital, but not time or circumstance. Spontaneous language is fluent and coherent with intact comprehension of simple commands, naming, and repetition.  Cranial Nerves:  VFF, pupils 2 mm, round, minimally reactive, dysconjugate gaze at rest, L eye exotropia, right facial droop, mild dysarthria  Motor:  normal muscle tone and bulk, no abnormal movements, able to move all limbs spontaneously, no drift BUEs, no antigravity strength BLEs, wiggles toes readily  Reflexes:  no clonus, toes mute  Sensory:  detects noxious in 4/4 extremities  Coordination:  not ataxia of observable movements  Station/Gait:  not tested    Stroke Scales    NIHSS  Interval 24 hrs post treatment (06/27/21 8889)   Interval Comments     1a. Level of Consciousness 0-->Alert, keenly responsive   1b. LOC Questions 0-->Answers both questions correctly   1c. LOC Commands 0-->Performs both tasks correctly   2.   Best Gaze 1-->Partial gaze palsy, gaze is abnormal in one or both eyes, but forced deviation or total gaze paresis is not present   3.   Visual 0-->No visual loss   4.   Facial Palsy 2-->Partial paralysis (total or near-total paralysis of lower face)   5a. Motor Arm, Left 0-->No drift, limb holds 90 (or 45) degrees for full 10 secs   5b. Motor Arm, Right 0-->No drift, limb holds 90 (or 45) degrees for full 10 secs   6a. Motor Leg, Left 3-->No effort against gravity, leg falls to bed immediately   6b. Motor Leg, right 3-->No effort against gravity, leg falls to bed immediately   7.   Limb Ataxia 0-->Absent   8.   Sensory 0-->Normal, no sensory loss   9.   Best Language 0-->No aphasia, normal   10. Dysarthria 1-->Mild-to-moderate dysarthria, patient slurs at least some words and, at worst, can be understood with some difficulty    11. Extinction and Inattention  0-->No abnormality   Total 10 (06/27/21 1649)       Imaging  I personally reviewed all imaging; relevant findings per HPI.     Lab Results Data   CBC  Recent Labs   Lab 06/27/21  0725 06/26/21  1531   WBC 12.1* 9.8   RBC 3.38* 3.07*   HGB 8.7* 8.0*   HCT 28.6* 26.6*    188     Basic Metabolic Panel    Recent Labs   Lab 06/27/21  0725 06/26/21  1531    139   POTASSIUM 3.3* 3.6   CHLORIDE 115* 114*   CO2 20 21   BUN 15 16   CR 1.32* 1.41*   GLC 67* 88   MANDI 7.7* 7.5*     Liver Panel  No results for input(s): PROTTOTAL, ALBUMIN, BILITOTAL, ALKPHOS, AST, ALT, BILIDIRECT in the last 168 hours.  INR  No lab results found.   Lipid Profile    Recent Labs   Lab Test 06/27/21  0725   CHOL 65   HDL 18*   LDL 31   TRIG 80     A1C  No lab results found.  Troponin I  No results for input(s): TROPI in the last 168 hours.

## 2021-06-28 ENCOUNTER — APPOINTMENT (OUTPATIENT)
Dept: PHYSICAL THERAPY | Facility: CLINIC | Age: 86
DRG: 288 | End: 2021-06-28
Attending: INTERNAL MEDICINE
Payer: MEDICARE

## 2021-06-28 ENCOUNTER — APPOINTMENT (OUTPATIENT)
Dept: SPEECH THERAPY | Facility: CLINIC | Age: 86
DRG: 288 | End: 2021-06-28
Attending: HOSPITALIST
Payer: MEDICARE

## 2021-06-28 ENCOUNTER — APPOINTMENT (OUTPATIENT)
Dept: GENERAL RADIOLOGY | Facility: CLINIC | Age: 86
DRG: 288 | End: 2021-06-28
Attending: INTERNAL MEDICINE
Payer: MEDICARE

## 2021-06-28 ENCOUNTER — APPOINTMENT (OUTPATIENT)
Dept: MRI IMAGING | Facility: CLINIC | Age: 86
DRG: 288 | End: 2021-06-28
Attending: STUDENT IN AN ORGANIZED HEALTH CARE EDUCATION/TRAINING PROGRAM
Payer: MEDICARE

## 2021-06-28 ENCOUNTER — APPOINTMENT (OUTPATIENT)
Dept: CARDIOLOGY | Facility: CLINIC | Age: 86
DRG: 288 | End: 2021-06-28
Attending: INTERNAL MEDICINE
Payer: MEDICARE

## 2021-06-28 LAB
ALBUMIN SERPL-MCNC: 1.6 G/DL (ref 3.4–5)
ALBUMIN SERPL-MCNC: 1.6 G/DL (ref 3.4–5)
ALP SERPL-CCNC: 77 U/L (ref 40–150)
ALT SERPL W P-5'-P-CCNC: 13 U/L (ref 0–50)
ANION GAP SERPL CALCULATED.3IONS-SCNC: 7 MMOL/L (ref 3–14)
ANION GAP SERPL CALCULATED.3IONS-SCNC: 7 MMOL/L (ref 3–14)
APTT PPP: 48 SEC (ref 22–37)
APTT PPP: 65 SEC (ref 22–37)
APTT PPP: 80 SEC (ref 22–37)
AST SERPL W P-5'-P-CCNC: 16 U/L (ref 0–45)
BASOPHILS # BLD AUTO: 0.1 10E9/L (ref 0–0.2)
BASOPHILS NFR BLD AUTO: 0.7 %
BILIRUB SERPL-MCNC: 0.3 MG/DL (ref 0.2–1.3)
BUN SERPL-MCNC: 13 MG/DL (ref 7–30)
BUN SERPL-MCNC: 14 MG/DL (ref 7–30)
CALCIUM SERPL-MCNC: 7.7 MG/DL (ref 8.5–10.1)
CALCIUM SERPL-MCNC: 7.8 MG/DL (ref 8.5–10.1)
CHLORIDE SERPL-SCNC: 115 MMOL/L (ref 94–109)
CHLORIDE SERPL-SCNC: 117 MMOL/L (ref 94–109)
CO2 SERPL-SCNC: 21 MMOL/L (ref 20–32)
CO2 SERPL-SCNC: 22 MMOL/L (ref 20–32)
CREAT SERPL-MCNC: 1.36 MG/DL (ref 0.52–1.04)
CREAT SERPL-MCNC: 1.42 MG/DL (ref 0.52–1.04)
DIFFERENTIAL METHOD BLD: ABNORMAL
EOSINOPHIL # BLD AUTO: 0.2 10E9/L (ref 0–0.7)
EOSINOPHIL NFR BLD AUTO: 1.4 %
ERYTHROCYTE [DISTWIDTH] IN BLOOD BY AUTOMATED COUNT: 16.5 % (ref 10–15)
GFR SERPL CREATININE-BSD FRML MDRD: 33 ML/MIN/{1.73_M2}
GFR SERPL CREATININE-BSD FRML MDRD: 34 ML/MIN/{1.73_M2}
GLUCOSE BLDC GLUCOMTR-MCNC: 140 MG/DL (ref 70–99)
GLUCOSE SERPL-MCNC: 106 MG/DL (ref 70–99)
GLUCOSE SERPL-MCNC: 118 MG/DL (ref 70–99)
HCT VFR BLD AUTO: 29.4 % (ref 35–47)
HGB BLD-MCNC: 9.2 G/DL (ref 11.7–15.7)
IMM GRANULOCYTES # BLD: 0.1 10E9/L (ref 0–0.4)
IMM GRANULOCYTES NFR BLD: 0.7 %
LYMPHOCYTES # BLD AUTO: 1.8 10E9/L (ref 0.8–5.3)
LYMPHOCYTES NFR BLD AUTO: 15.9 %
MAGNESIUM SERPL-MCNC: 1.8 MG/DL (ref 1.6–2.3)
MCH RBC QN AUTO: 26 PG (ref 26.5–33)
MCHC RBC AUTO-ENTMCNC: 31.3 G/DL (ref 31.5–36.5)
MCV RBC AUTO: 83 FL (ref 78–100)
MONOCYTES # BLD AUTO: 0.7 10E9/L (ref 0–1.3)
MONOCYTES NFR BLD AUTO: 6.2 %
NEUTROPHILS # BLD AUTO: 8.6 10E9/L (ref 1.6–8.3)
NEUTROPHILS NFR BLD AUTO: 75.1 %
NRBC # BLD AUTO: 0 10*3/UL
NRBC BLD AUTO-RTO: 0 /100
PHOSPHATE SERPL-MCNC: 2.4 MG/DL (ref 2.5–4.5)
PHOSPHATE SERPL-MCNC: 2.5 MG/DL (ref 2.5–4.5)
PLATELET # BLD AUTO: 304 10E9/L (ref 150–450)
POTASSIUM SERPL-SCNC: 3.2 MMOL/L (ref 3.4–5.3)
POTASSIUM SERPL-SCNC: 3.3 MMOL/L (ref 3.4–5.3)
POTASSIUM SERPL-SCNC: 3.5 MMOL/L (ref 3.4–5.3)
PROT SERPL-MCNC: 6.3 G/DL (ref 6.8–8.8)
RBC # BLD AUTO: 3.54 10E12/L (ref 3.8–5.2)
SODIUM SERPL-SCNC: 144 MMOL/L (ref 133–144)
SODIUM SERPL-SCNC: 145 MMOL/L (ref 133–144)
WBC # BLD AUTO: 11.4 10E9/L (ref 4–11)

## 2021-06-28 PROCEDURE — 250N000013 HC RX MED GY IP 250 OP 250 PS 637: Performed by: INTERNAL MEDICINE

## 2021-06-28 PROCEDURE — 99291 CRITICAL CARE FIRST HOUR: CPT | Performed by: INTERNAL MEDICINE

## 2021-06-28 PROCEDURE — 85730 THROMBOPLASTIN TIME PARTIAL: CPT | Performed by: INTERNAL MEDICINE

## 2021-06-28 PROCEDURE — 87040 BLOOD CULTURE FOR BACTERIA: CPT | Performed by: INTERNAL MEDICINE

## 2021-06-28 PROCEDURE — 250N000011 HC RX IP 250 OP 636: Performed by: INTERNAL MEDICINE

## 2021-06-28 PROCEDURE — 36415 COLL VENOUS BLD VENIPUNCTURE: CPT | Performed by: INTERNAL MEDICINE

## 2021-06-28 PROCEDURE — 87186 SC STD MICRODIL/AGAR DIL: CPT | Performed by: INTERNAL MEDICINE

## 2021-06-28 PROCEDURE — 87800 DETECT AGNT MULT DNA DIREC: CPT | Performed by: INTERNAL MEDICINE

## 2021-06-28 PROCEDURE — 99232 SBSQ HOSP IP/OBS MODERATE 35: CPT | Mod: GC | Performed by: PSYCHIATRY & NEUROLOGY

## 2021-06-28 PROCEDURE — 85025 COMPLETE CBC W/AUTO DIFF WBC: CPT | Performed by: INTERNAL MEDICINE

## 2021-06-28 PROCEDURE — 255N000002 HC RX 255 OP 636: Performed by: HOSPITALIST

## 2021-06-28 PROCEDURE — C9113 INJ PANTOPRAZOLE SODIUM, VIA: HCPCS | Performed by: INTERNAL MEDICINE

## 2021-06-28 PROCEDURE — 93306 TTE W/DOPPLER COMPLETE: CPT | Mod: 26 | Performed by: INTERNAL MEDICINE

## 2021-06-28 PROCEDURE — 97161 PT EVAL LOW COMPLEX 20 MIN: CPT | Mod: GP | Performed by: PHYSICAL THERAPIST

## 2021-06-28 PROCEDURE — 84132 ASSAY OF SERUM POTASSIUM: CPT | Performed by: INTERNAL MEDICINE

## 2021-06-28 PROCEDURE — 97110 THERAPEUTIC EXERCISES: CPT | Mod: GP | Performed by: PHYSICAL THERAPIST

## 2021-06-28 PROCEDURE — 999N000208 ECHOCARDIOGRAM COMPLETE

## 2021-06-28 PROCEDURE — 99207 PR NO CHARGE LOS: CPT | Performed by: INTERNAL MEDICINE

## 2021-06-28 PROCEDURE — 250N000011 HC RX IP 250 OP 636: Performed by: STUDENT IN AN ORGANIZED HEALTH CARE EDUCATION/TRAINING PROGRAM

## 2021-06-28 PROCEDURE — 92526 ORAL FUNCTION THERAPY: CPT | Mod: GN

## 2021-06-28 PROCEDURE — 80053 COMPREHEN METABOLIC PANEL: CPT | Performed by: INTERNAL MEDICINE

## 2021-06-28 PROCEDURE — 74018 RADEX ABDOMEN 1 VIEW: CPT

## 2021-06-28 PROCEDURE — 120N000001 HC R&B MED SURG/OB

## 2021-06-28 PROCEDURE — 87077 CULTURE AEROBIC IDENTIFY: CPT | Performed by: INTERNAL MEDICINE

## 2021-06-28 PROCEDURE — 999N001017 HC STATISTIC GLUCOSE BY METER IP

## 2021-06-28 PROCEDURE — 70551 MRI BRAIN STEM W/O DYE: CPT

## 2021-06-28 PROCEDURE — 250N000009 HC RX 250: Performed by: INTERNAL MEDICINE

## 2021-06-28 PROCEDURE — 84100 ASSAY OF PHOSPHORUS: CPT | Performed by: INTERNAL MEDICINE

## 2021-06-28 PROCEDURE — 80069 RENAL FUNCTION PANEL: CPT | Performed by: INTERNAL MEDICINE

## 2021-06-28 PROCEDURE — 83735 ASSAY OF MAGNESIUM: CPT | Performed by: INTERNAL MEDICINE

## 2021-06-28 PROCEDURE — 97530 THERAPEUTIC ACTIVITIES: CPT | Mod: GP | Performed by: PHYSICAL THERAPIST

## 2021-06-28 RX ORDER — GABAPENTIN 100 MG/1
200 CAPSULE ORAL AT BEDTIME
Status: DISCONTINUED | OUTPATIENT
Start: 2021-06-28 | End: 2021-07-06

## 2021-06-28 RX ORDER — FOLIC ACID 1 MG/1
2000 TABLET ORAL DAILY
Status: DISCONTINUED | OUTPATIENT
Start: 2021-06-28 | End: 2021-07-08 | Stop reason: HOSPADM

## 2021-06-28 RX ORDER — GADOBUTROL 604.72 MG/ML
8 INJECTION INTRAVENOUS ONCE
Status: COMPLETED | OUTPATIENT
Start: 2021-06-28 | End: 2021-07-02

## 2021-06-28 RX ORDER — POTASSIUM CHLORIDE 1500 MG/1
20 TABLET, EXTENDED RELEASE ORAL ONCE
Status: COMPLETED | OUTPATIENT
Start: 2021-06-28 | End: 2021-06-28

## 2021-06-28 RX ORDER — LANOLIN ALCOHOL/MO/W.PET/CERES
1000 CREAM (GRAM) TOPICAL DAILY
Status: DISCONTINUED | OUTPATIENT
Start: 2021-06-28 | End: 2021-07-08 | Stop reason: HOSPADM

## 2021-06-28 RX ADMIN — PANTOPRAZOLE SODIUM 40 MG: 40 INJECTION, POWDER, FOR SOLUTION INTRAVENOUS at 21:13

## 2021-06-28 RX ADMIN — PIPERACILLIN SODIUM AND TAZOBACTAM SODIUM 2.25 G: 2; .25 INJECTION, POWDER, LYOPHILIZED, FOR SOLUTION INTRAVENOUS at 12:28

## 2021-06-28 RX ADMIN — GABAPENTIN 200 MG: 100 CAPSULE ORAL at 21:13

## 2021-06-28 RX ADMIN — PIPERACILLIN SODIUM AND TAZOBACTAM SODIUM 2.25 G: 2; .25 INJECTION, POWDER, LYOPHILIZED, FOR SOLUTION INTRAVENOUS at 05:26

## 2021-06-28 RX ADMIN — PIPERACILLIN SODIUM AND TAZOBACTAM SODIUM 2.25 G: 2; .25 INJECTION, POWDER, LYOPHILIZED, FOR SOLUTION INTRAVENOUS at 22:36

## 2021-06-28 RX ADMIN — CYANOCOBALAMIN TAB 1000 MCG 1000 MCG: 1000 TAB at 14:19

## 2021-06-28 RX ADMIN — HEPARIN SODIUM 750 UNITS/HR: 10000 INJECTION, SOLUTION INTRAVENOUS at 17:16

## 2021-06-28 RX ADMIN — ONDANSETRON 4 MG: 2 INJECTION INTRAMUSCULAR; INTRAVENOUS at 07:36

## 2021-06-28 RX ADMIN — METOPROLOL TARTRATE 12.5 MG: 25 TABLET, FILM COATED ORAL at 21:13

## 2021-06-28 RX ADMIN — PANTOPRAZOLE SODIUM 40 MG: 40 INJECTION, POWDER, FOR SOLUTION INTRAVENOUS at 10:21

## 2021-06-28 RX ADMIN — HEPARIN SODIUM 600 UNITS/HR: 10000 INJECTION, SOLUTION INTRAVENOUS at 03:17

## 2021-06-28 RX ADMIN — POTASSIUM CHLORIDE 20 MEQ: 1500 TABLET, EXTENDED RELEASE ORAL at 10:25

## 2021-06-28 RX ADMIN — METOPROLOL TARTRATE 2.5 MG: 5 INJECTION INTRAVENOUS at 00:00

## 2021-06-28 RX ADMIN — METOPROLOL TARTRATE 12.5 MG: 25 TABLET, FILM COATED ORAL at 12:29

## 2021-06-28 RX ADMIN — PIPERACILLIN SODIUM AND TAZOBACTAM SODIUM 2.25 G: 2; .25 INJECTION, POWDER, LYOPHILIZED, FOR SOLUTION INTRAVENOUS at 17:57

## 2021-06-28 RX ADMIN — FOLIC ACID 2000 MCG: 1 TABLET ORAL at 12:29

## 2021-06-28 RX ADMIN — HUMAN ALBUMIN MICROSPHERES AND PERFLUTREN 3 ML: 10; .22 INJECTION, SOLUTION INTRAVENOUS at 08:56

## 2021-06-28 RX ADMIN — ATORVASTATIN CALCIUM 20 MG: 20 TABLET, FILM COATED ORAL at 21:13

## 2021-06-28 RX ADMIN — METOPROLOL TARTRATE 2.5 MG: 5 INJECTION INTRAVENOUS at 05:36

## 2021-06-28 ASSESSMENT — MIFFLIN-ST. JEOR
SCORE: 1163.38
SCORE: 1198.38

## 2021-06-28 ASSESSMENT — ACTIVITIES OF DAILY LIVING (ADL)
ADLS_ACUITY_SCORE: 27

## 2021-06-28 NOTE — PROGRESS NOTES
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     No invasive intervention recommended by the Neurology, started on low intensity IV Heparin, strict bed rest, every 1 hour neuro check. ICU monitoring, permissive Hypertension, remain stable at this time,     At this time will order Echocardiogram with bubble study still pending, check Lipid panel, start on Lipitor 40 mg daily, LDL back 31 only, decrease Lipitor to 20 mg. Speech, PT and OT consulted to evaluate. Most likely is cardio embolic. Repeat CT head shows Possible subacute ischemic infarct in the right frontal and left occipital lobe, CTA head shows nonocclusive thrombus at the proxima basilar artery remain stable. Continue with IV heparin at this time. MRI of the brain shows acute/early subacute infarct at the right joey/medullary junction No other significant past medical history or family history. No hemorrhage.     Overall stable now, on dysphagia level 2 diet, can switch to oral eliquis once ok with the Neurology till then continue with low intensity IV  heparin.                     Permanent atrial fib --               -- She is on Metoprolol, resume oral Metoprolol 12.5 mg bid and stop IV Metoprolol now.   Eliquis was on hold before admission because of GI bleed, currently on IV Heparin, await clearance from  Neurology to start on Eliquis. Rate control at this time.      Possible Community acquired Pneumonia vs Aspiration Pneumonia.   Bacteremia   She reportedly had fever of 101.5 at outside hospital, her Xray chest on admission shows patchy bibasilar opacities suspicious for Pneumonic infiltrate. She was started on IV Zosyn,  Agree with that.   Patient is now Afebrile, Procalcitonin level elevated and BC positive 2 of 2 from [facility], but does not said what is growing, it only said positive blood culture. Will need to follow BC results from [facility].   Check MRSA screen negative, no need for Vancomycin at this time.   Will repeat blood culture X 2 today        Acute Renal Failure.   Patient has recently elevated creatinine in the range of 1.2-1.4, but in may of 2021 and march of 2021 she has normal creatinine. Most likely develop ARF during her admission with GI bleeding and likely in ATN at this time.   She is on IV fluids NS at 75 ml/hr as received IV contrast twice for CTA head and neck and perfusion,.   Creatinine on admission was 1.5 now improve to 1.36 at this time. Continue to monitor.   Avoid NSAID's and nephrotoxic medications.   Stop IV fluids now, creatinine remain at her new baseline.        Chronic Anemia   H/O Recent Upper GI bleeding secondary to Angio ectasia   Patient appears to have chronic anemia with recent acute on chronic anemia secondary to GI bleeding because of   duodenal angioectasia s/p clip placement. Her Hb is now stable. She is tolerating IV heparin and Hb improve to 8.7 today .   She is currently on IV Protonix 40 bid, agree with that. Hb remain stable and improving.          History of stroke   prior old Right frontal, and  recently left Cerebellar on 4/8/21, and unable to walk related to left leg weakness  Wheel chair bound.        Benign essential hypertension  Reasonably control at this time. On Metoprolol 12.5 mg bid at home.   Restart oral metoprolol 12.5 mg bid.           S/P AVR & Ascending Aortic Aneur repair   Stable.    Bilateral Lower Extremity edema and Weakness.   She has 2+ LE edema, most likely chronic.   US Venous Bilateral LE negative for DVT  echocardiogram ordered and still pending.     Hypokalemia/Hypernatremia.   Replace potassium today as per protocol.  Hypernatremia likely because of NS  Stop IV fluids now.        Need to follow blood culture results, repeat blood culture X 2 now   Continue with IV Zosyn  Echocardiogram ordered and pending at this time.   Remain stale hemodynamically   Stop IV fluids now  Transfer to neuro floor with telemetry.      Discussed with patient, Neurology  and the nursing staff taking care of the patient today       total time spend today is 30 min of critical care time which include history taking, examination, chart review, formulation of the plan, counseling and coordination of care.     DVT Prophylaxis: Heparin IV  Code Status: No CPR- Do NOT Intubate    Disposition: Expected discharge TBD    Jean Grubbs MD  Text Page  (7am - 6pm)    Interval History   Patient awake and alert today, denies any fever, chills, chest pain, SOB, headache or dizziness.     No other significant event.     Last 24 hrs events reviewed with the nursing staff     -Data reviewed today: I reviewed all new labs and imaging results over the last 24 hours. I personally reviewed no images or EKG's today.    Physical Exam   Temp: 98.3  F (36.8  C) Temp src: Oral BP: 116/60 Pulse: 73   Resp: 15 SpO2: 96 % O2 Device: None (Room air)    Vitals:    06/26/21 1600 06/27/21 0700 06/28/21 0400   Weight: 74.8 kg (164 lb 14.5 oz) 81.6 kg (179 lb 14.3 oz) 83.6 kg (184 lb 4.9 oz)     Vital Signs with Ranges  Temp:  [98.1  F  (36.7  C)-98.4  F (36.9  C)] 98.3  F (36.8  C)  Pulse:  [73-89] 73  Resp:  [9-21] 15  BP: ()/(49-72) 116/60  SpO2:  [93 %-97 %] 96 %  I/O last 3 completed shifts:  In: 2794.5 [P.O.:480; I.V.:2314.5]  Out: 1800 [Urine:1800]    Constitutional: awake, alert, cooperative, no apparent distress, and appears stated age  Eyes: Lids and lashes normal, pupils equal, round and reactive to light, extra ocular muscles intact, sclera clear, conjunctiva normal  Respiratory: No increased work of breathing, good air exchange, clear to auscultation bilaterally, no crackles or wheezing  Cardiovascular: Normal apical impulse, regular rate and rhythm, normal S1 and S2, no S3 or S4, and no murmur noted  GI: No scars, normal bowel sounds, soft, non-distended, non-tender, no masses palpated, no hepatosplenomegally  Musculoskeletal: 2+ bilateral  lower extremity pitting edema present  Neurologic: awake, alert and following command, has right facial droop,  Moving both upper extremities, weakness of both lower extremities.     Medications     heparin 750 Units/hr (06/28/21 0843)     - MEDICATION INSTRUCTIONS -         atorvastatin  20 mg Oral QPM     gadobutrol  8 mL Intravenous Once     metoprolol  2.5 mg Intravenous Q6H     pantoprazole (PROTONIX) IV  40 mg Intravenous BID     piperacillin-tazobactam  2.25 g Intravenous Q6H     sodium chloride 0.9 %  100 mL As instructed Once     sodium chloride (PF)  3 mL Intracatheter Q8H       Data   Recent Labs   Lab 06/28/21  0855 06/28/21  0744 06/27/21  1635 06/27/21  0725 06/26/21  1531   WBC  --  11.4*  --  12.1* 9.8   HGB  --  9.2*  --  8.7* 8.0*   MCV  --  83  --  85 87   PLT  --  304  --  253 188   * 144  --  144 139   POTASSIUM 3.2* 3.3* 3.3* 3.3* 3.6   CHLORIDE 117* 115*  --  115* 114*   CO2 21 22  --  20 21   BUN 13 14  --  15 16   CR 1.36* 1.42*  --  1.32* 1.41*   ANIONGAP 7 7  --  9 4   MANDI 7.7* 7.8*  --  7.7* 7.5*   * 106*  --  67* 88   ALBUMIN 1.6* 1.6*  --   --   --     PROTTOTAL 6.3*  --   --   --   --    BILITOTAL 0.3  --   --   --   --    ALKPHOS 77  --   --   --   --    ALT 13  --   --   --   --    AST 16  --   --   --   --        Recent Results (from the past 24 hour(s))   US Lower Extremity Venous Duplex Bilateral    Narrative    EXAM: US LOWER EXTREMITY VENOUS DUPLEX BILATERAL  LOCATION: [facility]  DATE/TIME: 6/27/2021 7:46 PM    INDICATION: Bilateral leg swelling.  COMPARISON: None.  TECHNIQUE: Venous Duplex ultrasound of bilateral lower extremities with and without compression, augmentation and duplex. Color flow and spectral Doppler with waveform analysis performed.    FINDINGS: Exam includes the common femoral, femoral, popliteal veins as well as segmentally visualized deep calf veins and greater saphenous vein.     RIGHT: No deep vein thrombosis. No superficial thrombophlebitis. No popliteal cyst.    LEFT: No deep vein thrombosis. No superficial thrombophlebitis. No popliteal cyst.      Impression    IMPRESSION:  1.  No deep venous thrombosis in the bilateral lower extremities.   XR Abdomen Port 1 View    Narrative    EXAM: XR ABDOMEN PORT 1 VIEWS  LOCATION: [facility]  DATE/TIME: 6/28/2021 3:15 AM    INDICATION: Assess for retained endoscopic capsule prior to MRI.  COMPARISON: None.      Impression    IMPRESSION: There is a 2 cm metallic foreign body in the central abdomen. Bowel gas pattern is unremarkable.    MR Brain w/o Contrast    Narrative    EXAM: MR BRAIN W/O CONTRAST  LOCATION: [facility]  DATE/TIME: 6/28/2021 2:35 AM    INDICATION: Stroke, follow up  COMPARISON: CTA head and neck 6/26/2021  TECHNIQUE: Routine multiplanar multisequence head MRI without intravenous contrast.    FINDINGS:  Motion degraded exam.    INTRACRANIAL CONTENTS: There is a small subcentimeter zone of restricted diffusion at the right pontine/medullary junction. No associated hemorrhage. There is associated T2/flair hyperintensity. No  associated hemorrhage. Patchy and confluent nonspecific   T2/FLAIR hyperintensities within the cerebral white matter most consistent with moderate chronic microvascular ischemic change. Chronic infarct right frontal lobe. Chronic infarct left occipital lobe. Chronic lacunar infarcts in the bilateral cerebellar   hemispheres. Chronic lacunar infarct in the left joey. Mild to moderate generalized cerebral atrophy. No hydrocephalus. Normal position of the cerebellar tonsils.     SELLA: No abnormality accounting for technique.    OSSEOUS STRUCTURES/SOFT TISSUES: Normal marrow signal. The major intracranial vascular flow voids are maintained.     ORBITS: No abnormality accounting for technique.     SINUSES/MASTOIDS: Mild mucosal thickening scattered about the paranasal sinuses. Scattered fluid/membrane thickening in the mastoid air cells bilaterally.       Impression    IMPRESSION:  1.  Motion degraded exam.  2.  Subcentimeter acute/early subacute infarct at the right joey/medullary junction. No associated hemorrhage.  3.  Age-related changes with chronic infarcts in the left joey, left occipital lobe, bilateral cerebellar hemispheres, and right frontal lobe.

## 2021-06-28 NOTE — PROGRESS NOTES
Growing enterococcus in blood culture, Currently on IV Zosyn continue with that.   Follow sensitivities  Repeat blood culture X 2 today

## 2021-06-28 NOTE — PLAN OF CARE
"/81 (BP Location: Right arm)   Pulse 85   Temp 98  F (36.7  C) (Oral)   Resp 14   Ht 1.549 m (5' 1\")   Wt 83.6 kg (184 lb 4.9 oz)   SpO2 96%   BMI 34.82 kg/m      Nursing shift note  Summary: Patient in with   Alertness: Alert x4, pleasant. Later was somewhat confused thinking it was morning.  Orientation: See above  Neuro: Slight right droop   Cardiac: Apical pulse irreg Tele: afib CVR with PVC  Resp: Clear dim  GI: BS positive  : Purewick in place  CMS: Intact  Mobility: Lift  Pain: None  Diet: DD2 Nectar thick  Skin: Buttocks skin tear, q2 turns, has bruise on right arm  Other Important Info:     "

## 2021-06-28 NOTE — PROGRESS NOTES
Hospitalist service cross-cover note:     Paged regarding concern for retained endoscopic capsule prior to MRI.  Ordered KUB.    Dain Larios MD   Hospitalist  [phone]

## 2021-06-28 NOTE — PROGRESS NOTES
"   [facility]    Stroke Progress Note    Interval Events  MRI demonstrates small R pontomedullary infarct.    Impression  1. Pontomedullary infarct secondary to partially occlusive basilar artery thrombus secondary to cardioembolism from atrial fibrillation not on anticoagulation due to recent GI bleed, R facial droop as a result    Plan  - Okay to space neurochecks to q4 hours and transfer out of ICU  - Goal normotension, <130/80 or lower if tolerated for overall reduced cardiovascular risk. Please titrate enteral meds to achieve. IV PRN BP meds only for SBP >180.  - Decrease atorvastatin 40 mg daily to 20 mg daily in light of LDL 31 and increased risk of ICH in LDL<40  - TTE with bubble study    The Stroke Staff is Dr. Meade.    Mleissa Abbott MD  Vascular Neurology Fellow  To page me or covering stroke neurology team member, click here: AMCOM   Choose \"On Call\" tab at top, then search dropdown box for \"Neurology Adult\", select location, press Enter, then look for stroke/neuro ICU/telestroke.    ______________________________________________________    Medications   Home Meds  Prior to Admission medications    Medication Sig Start Date End Date Taking? Authorizing Provider   acetaminophen (TYLENOL) 325 MG tablet Take 650 mg by mouth every 8 hours as needed for mild pain   Yes Unknown, Entered By History   apixaban ANTICOAGULANT (ELIQUIS) 5 MG tablet Take 5 mg by mouth 2 times daily   Yes Unknown, Entered By History   atorvastatin (LIPITOR) 40 MG tablet Take 40 mg by mouth At Bedtime   Yes Unknown, Entered By History   cyanocobalamin (VITAMIN B-12) 1000 MCG tablet Take 1,000 mcg by mouth daily   Yes Unknown, Entered By History   ferrous sulfate (FEROSUL) 325 (65 Fe) MG tablet Take 325 mg by mouth three times a week Mon - Wed - Fri. Takes with orange juice.   Yes Unknown, Entered By History   folic acid (FOLVITE) 400 MCG tablet Take 2,000 mcg by mouth daily 400 mcg x 5 tablets for 1 month " then on 7/18/21 decrease to 400 mcg daily   Yes Unknown, Entered By History   gabapentin (NEURONTIN) 300 MG capsule Take 300 mg by mouth At Bedtime   Yes Unknown, Entered By History   latanoprost (XALATAN) 0.005 % ophthalmic solution Place 1 drop into both eyes At Bedtime   Yes Unknown, Entered By History   metoprolol tartrate (LOPRESSOR) 25 MG tablet Take 12.5 mg by mouth 2 times daily   Yes Unknown, Entered By History   mirtazapine (REMERON SOL-TAB) 15 MG ODT 15 mg by Orally disintegrating tablet route At Bedtime   Yes Unknown, Entered By History   omeprazole (PRILOSEC) 20 MG DR capsule Take 20 mg by mouth daily   Yes Unknown, Entered By History   ondansetron (ZOFRAN-ODT) 4 MG ODT tab Take 4 mg by mouth every morning   Yes Unknown, Entered By History   ondansetron (ZOFRAN-ODT) 4 MG ODT tab Take 4 mg by mouth 2 times daily as needed for nausea   Yes Unknown, Entered By History       Scheduled Meds    atorvastatin  20 mg Oral QPM     cyanocobalamin  1,000 mcg Oral Daily     folic acid  2,000 mcg Oral Daily     gabapentin  200 mg Oral At Bedtime     gadobutrol  8 mL Intravenous Once     metoprolol tartrate  12.5 mg Oral BID     pantoprazole (PROTONIX) IV  40 mg Intravenous BID     piperacillin-tazobactam  2.25 g Intravenous Q6H     sodium chloride 0.9 %  100 mL As instructed Once     sodium chloride (PF)  3 mL Intracatheter Q8H       Infusion Meds    heparin 750 Units/hr (06/28/21 1716)     - MEDICATION INSTRUCTIONS -         PRN Meds  acetaminophen, lidocaine 4%, lidocaine (buffered or not buffered), melatonin, ondansetron **OR** ondansetron, - MEDICATION INSTRUCTIONS -, sodium chloride (PF)       PHYSICAL EXAMINATION  Temp:  [98  F (36.7  C)-98.3  F (36.8  C)] 98  F (36.7  C)  Pulse:  [73-95] 85  Resp:  [10-21] 14  BP: (103-138)/(55-81) 138/81  SpO2:  [93 %-96 %] 96 %     General:  patient lying in bed without any acute distress    HEENT:  normocephalic/atraumatic, edentulous  Cardio:  irregularly  irregular  Pulmonary:  no respiratory distress  Abdomen:  soft, non-tender, non-distended  Extremities:  no edema, peripheral pulses palpable  Skin:  intact, warm/dry      Neurologic  Mental Status:  Eyes open spontaneously, alert and attentive, oriented to self, time, place and circumstnce and hospital, but not time or circumstance. Spontaneous language is fluent and coherent with intact comprehension of simple commands, naming, and repetition.  Cranial Nerves:  VFF, pupils 2 mm, round, minimally reactive, dysconjugate gaze at rest, L eye exotropia, right facial droop, mild dysarthria  Motor:  normal muscle tone and bulk, no abnormal movements, able to move all limbs spontaneously, no drift BUEs, no antigravity strength BLEs, wiggles toes readily  Reflexes:  no clonus, toes mute  Sensory:  detects noxious in 4/4 extremities  Coordination:  not ataxia of observable movements  Station/Gait:  not tested    Stroke Scales    NIHSS  Interval 24 hrs post treatment (06/27/21 1649)   Interval Comments     1a. Level of Consciousness 0-->Alert, keenly responsive   1b. LOC Questions 0-->Answers both questions correctly   1c. LOC Commands 0-->Performs both tasks correctly   2.   Best Gaze 0-->Normal   3.   Visual 0-->No visual loss   4.   Facial Palsy 2-->Partial paralysis (total or near-total paralysis of lower face)   5a. Motor Arm, Left 0-->No drift, limb holds 90 (or 45) degrees for full 10 secs   5b. Motor Arm, Right 0-->No drift, limb holds 90 (or 45) degrees for full 10 secs   6a. Motor Leg, Left 2-->Some effort against gravity, leg falls to bed by 5 secs, but has some effort against gravity   6b. Motor Leg, right 2-->Some effort against gravity, leg falls to bed by 5 secs, but has some effort against gravity   7.   Limb Ataxia 0-->Absent   8.   Sensory 0-->Normal, no sensory loss   9.   Best Language 0-->No aphasia, normal   10. Dysarthria 0-->Normal   11. Extinction and Inattention  0-->No abnormality   Total 6  (06/28/21 1532)       Imaging  I personally reviewed all imaging; relevant findings per HPI.     Lab Results Data   CBC  Recent Labs   Lab 06/28/21  0744 06/27/21  0725 06/26/21  1531   WBC 11.4* 12.1* 9.8   RBC 3.54* 3.38* 3.07*   HGB 9.2* 8.7* 8.0*   HCT 29.4* 28.6* 26.6*    253 188     Basic Metabolic Panel    Recent Labs   Lab 06/28/21  1424 06/28/21  0855 06/28/21  0744 06/27/21  0725 06/27/21  0725   NA  --  145* 144  --  144   POTASSIUM 3.5 3.2* 3.3*   < > 3.3*   CHLORIDE  --  117* 115*  --  115*   CO2  --  21 22  --  20   BUN  --  13 14  --  15   CR  --  1.36* 1.42*  --  1.32*   GLC  --  118* 106*  --  67*   MANDI  --  7.7* 7.8*  --  7.7*    < > = values in this interval not displayed.     Liver Panel  Recent Labs   Lab 06/28/21  0855 06/28/21  0744   PROTTOTAL 6.3*  --    ALBUMIN 1.6* 1.6*   BILITOTAL 0.3  --    ALKPHOS 77  --    AST 16  --    ALT 13  --      INR  No lab results found.   Lipid Profile    Recent Labs   Lab Test 06/27/21  0725   CHOL 65   HDL 18*   LDL 31   TRIG 80     A1C  No lab results found.  Troponin I  No results for input(s): TROPI in the last 168 hours.

## 2021-06-28 NOTE — PROGRESS NOTES
A&Ox4. Neuro assessment remains unchanged, see flowsheets. Tele afib +CVR. VSS. Denies pain. BLE US completed, negative for DVT. Hospitalist notified regarding need for KUB to rule out endoscopic capsule prior to MRI. Radiologist gave permission for MRI, which was completed without contrast. Antonette, patient's daughter updated via telephone. Monitor.

## 2021-06-28 NOTE — PROGRESS NOTES
06/28/21 1352   Quick Adds   Type of Visit Initial PT Evaluation   Living Environment   People in home child(kade), adult   Current Living Arrangements house   Self-Care   Usual Activity Tolerance good   Current Activity Tolerance fair   Regular Exercise No   Equipment Currently Used at Home walker, standard;wheelchair, manual;lift device   Activity/Exercise/Self-Care Comment per home PT note  pt CGA w/ FWW bed/chair to WC and walking a few steps w A to BR, short distance amb in room, B foot drop using B AFO with mobility   Disability/Function   Hearing Difficulty or Deaf no   Wear Glasses or Blind yes   Vision Management glasses   Concentrating, Remembering or Making Decisions Difficulty no   Difficulty Communicating no   Difficulty Eating/Swallowing no   Walking or Climbing Stairs Difficulty yes   Walking or Climbing Stairs ambulation difficulty, requires equipment;ambulation difficulty, assistance 1 person   Mobility Management WW, WC   Dressing/Bathing Difficulty yes   Dressing/Bathing bathing difficulty, assistance 1 person   Toileting issues yes   Toileting Assistance toileting difficulty, dependent   Doing Errands Independently Difficulty (such as shopping) no   Fall history within last six months no   Change in Functional Status Since Onset of Current Illness/Injury yes   General Information   Onset of Illness/Injury or Date of Surgery 06/26/21   Referring Physician Romie   Patient/Family Therapy Goals Statement (PT) return home   Pertinent History of Current Problem (include personal factors and/or comorbidities that impact the POC) 89 year old female with past medical hx of Hypertension, chronic afib on eliquis but not taking it recently because of the recent GI bleeding secondary to 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; found to have  basilar arterial occlusion, declined thrombectomy   Existing Precautions/Restrictions fall   Cognition   Orientation Status (Cognition) person;place   Affect/Mental Status (Cognition) confused   Follows Commands (Cognition) follows one-step commands;25-49% accuracy;repetition of directions required;verbal cues/prompting required   Safety Deficit (Cognition) at risk behavior observed;safety precautions awareness;safety precautions follow-through/compliance   Memory Deficit (Cognition) recall, biographical information;recall, recent events   Cognitive Status Comments pt with baseline dementia, cared for by daughter at home   Integumentary/Edema   Integumentary/Edema Comments lower leg edema noted   Posture    Posture Forward head position;Protracted shoulders   Range of Motion (ROM)   ROM Comment LE ROM is WFL   Strength   Strength Comments B DF 3+/5, HF 3-/5, remaining LE strength 4-/5 grossly   Bed Mobility   Comment (Bed Mobility) max A x 1   Transfers   Transfer Safety Comments Mod A x 2 for sit to stand w WW   Gait/Stairs (Locomotion)   Comment (Gait/Stairs) unable   Balance   Balance Comments good sitting balance with sBA, standing balance req A x 2 and WW, strong R anterior lean   Sensory Examination   Sensory Perception Comments pt denies any changes in sensation   Clinical Impression   Criteria for Skilled Therapeutic Intervention yes, treatment indicated   PT Diagnosis (PT) CVI   Influenced by the following impairments weakness, unable to walk , dec indep w/ transfer   Functional limitations due to impairments impaired mobility   Clinical Presentation Evolving/Changing   Clinical Presentation Rationale PMH and clinical assessment   Clinical Decision Making (Complexity) low complexity   Therapy Frequency (PT) Daily   Predicted Duration of Therapy Intervention (days/wks) 5 days   Planned Therapy Interventions (PT) bed mobility training;gait  training;strengthening;transfer training   Risk & Benefits of therapy have been explained evaluation/treatment results reviewed;care plan/treatment goals reviewed;risks/benefits reviewed;patient   PT Discharge Planning    PT Discharge Recommendation (DC Rec) home with assist;home with home care physical therapy;Transitional Care Facility   PT Rationale for DC Rec Per chart reviewed from home therapy note pt previously CGA/min A for transfers with WW and short distance gait, pt having home PT 2x week, pt presently mod  to max A 1-2 for bed moblity and transfers will benefit from cont PT during stay, anticipating family will want pt to dC to home with continued home PT, however if family unable to support expected increased need for assistance TCU may need to be considered.   Total Evaluation Time   Total Evaluation Time (Minutes) 12

## 2021-06-28 NOTE — PLAN OF CARE
Alert and Oriented. Neuro's charted, See flowsheet. Tele Afib+CVR. BP unremarkable. Afebrile. LS coarse, Diminished. Denies any SOB. Afebrile. +BC per [facility], Provider notified. Up in chair with therapy. Edema Unchanged. Tolerating PO well but poor appetite. Heparin gtts.Planned transfer to [facility] when bed available. Family at bedside. Nursing to follow closely     Update 1584... Transferred with RN and NA via  up to [facility]. Family at bedside. Nursing to follow closely

## 2021-06-29 ENCOUNTER — APPOINTMENT (OUTPATIENT)
Dept: OCCUPATIONAL THERAPY | Facility: CLINIC | Age: 86
DRG: 288 | End: 2021-06-29
Attending: INTERNAL MEDICINE
Payer: MEDICARE

## 2021-06-29 ENCOUNTER — APPOINTMENT (OUTPATIENT)
Dept: PHYSICAL THERAPY | Facility: CLINIC | Age: 86
DRG: 288 | End: 2021-06-29
Attending: HOSPITALIST
Payer: MEDICARE

## 2021-06-29 LAB
ALBUMIN SERPL-MCNC: 1.5 G/DL (ref 3.4–5)
ANION GAP SERPL CALCULATED.3IONS-SCNC: 7 MMOL/L (ref 3–14)
APTT PPP: 76 SEC (ref 22–37)
BASOPHILS # BLD AUTO: 0.1 10E9/L (ref 0–0.2)
BASOPHILS NFR BLD AUTO: 0.9 %
BUN SERPL-MCNC: 12 MG/DL (ref 7–30)
CALCIUM SERPL-MCNC: 7.8 MG/DL (ref 8.5–10.1)
CHLORIDE SERPL-SCNC: 116 MMOL/L (ref 94–109)
CO2 SERPL-SCNC: 22 MMOL/L (ref 20–32)
CREAT SERPL-MCNC: 1.37 MG/DL (ref 0.52–1.04)
DIFFERENTIAL METHOD BLD: ABNORMAL
EOSINOPHIL # BLD AUTO: 0.2 10E9/L (ref 0–0.7)
EOSINOPHIL NFR BLD AUTO: 2.1 %
ERYTHROCYTE [DISTWIDTH] IN BLOOD BY AUTOMATED COUNT: 16.8 % (ref 10–15)
GFR SERPL CREATININE-BSD FRML MDRD: 34 ML/MIN/{1.73_M2}
GLUCOSE SERPL-MCNC: 103 MG/DL (ref 70–99)
HCT VFR BLD AUTO: 27.2 % (ref 35–47)
HGB BLD-MCNC: 8.2 G/DL (ref 11.7–15.7)
IMM GRANULOCYTES # BLD: 0 10E9/L (ref 0–0.4)
IMM GRANULOCYTES NFR BLD: 0.4 %
INR PPP: 1.3 (ref 0.86–1.14)
LYMPHOCYTES # BLD AUTO: 1.6 10E9/L (ref 0.8–5.3)
LYMPHOCYTES NFR BLD AUTO: 19.8 %
MCH RBC QN AUTO: 25.5 PG (ref 26.5–33)
MCHC RBC AUTO-ENTMCNC: 30.1 G/DL (ref 31.5–36.5)
MCV RBC AUTO: 85 FL (ref 78–100)
MONOCYTES # BLD AUTO: 0.5 10E9/L (ref 0–1.3)
MONOCYTES NFR BLD AUTO: 6.6 %
NEUTROPHILS # BLD AUTO: 5.6 10E9/L (ref 1.6–8.3)
NEUTROPHILS NFR BLD AUTO: 70.2 %
NRBC # BLD AUTO: 0 10*3/UL
NRBC BLD AUTO-RTO: 0 /100
PHOSPHATE SERPL-MCNC: 2.4 MG/DL (ref 2.5–4.5)
PLATELET # BLD AUTO: 252 10E9/L (ref 150–450)
POTASSIUM SERPL-SCNC: 3.2 MMOL/L (ref 3.4–5.3)
POTASSIUM SERPL-SCNC: 3.3 MMOL/L (ref 3.4–5.3)
RBC # BLD AUTO: 3.22 10E12/L (ref 3.8–5.2)
SODIUM SERPL-SCNC: 145 MMOL/L (ref 133–144)
WBC # BLD AUTO: 8 10E9/L (ref 4–11)

## 2021-06-29 PROCEDURE — 250N000009 HC RX 250: Performed by: INTERNAL MEDICINE

## 2021-06-29 PROCEDURE — 250N000013 HC RX MED GY IP 250 OP 250 PS 637

## 2021-06-29 PROCEDURE — 97110 THERAPEUTIC EXERCISES: CPT | Mod: GP | Performed by: PHYSICAL THERAPIST

## 2021-06-29 PROCEDURE — 80069 RENAL FUNCTION PANEL: CPT | Performed by: INTERNAL MEDICINE

## 2021-06-29 PROCEDURE — 250N000013 HC RX MED GY IP 250 OP 250 PS 637: Performed by: INTERNAL MEDICINE

## 2021-06-29 PROCEDURE — 36415 COLL VENOUS BLD VENIPUNCTURE: CPT | Performed by: INTERNAL MEDICINE

## 2021-06-29 PROCEDURE — 250N000011 HC RX IP 250 OP 636: Performed by: STUDENT IN AN ORGANIZED HEALTH CARE EDUCATION/TRAINING PROGRAM

## 2021-06-29 PROCEDURE — 120N000001 HC R&B MED SURG/OB

## 2021-06-29 PROCEDURE — 97535 SELF CARE MNGMENT TRAINING: CPT | Mod: GO | Performed by: OCCUPATIONAL THERAPIST

## 2021-06-29 PROCEDURE — 99233 SBSQ HOSP IP/OBS HIGH 50: CPT | Performed by: INTERNAL MEDICINE

## 2021-06-29 PROCEDURE — 85730 THROMBOPLASTIN TIME PARTIAL: CPT | Performed by: INTERNAL MEDICINE

## 2021-06-29 PROCEDURE — C9113 INJ PANTOPRAZOLE SODIUM, VIA: HCPCS | Performed by: INTERNAL MEDICINE

## 2021-06-29 PROCEDURE — 258N000003 HC RX IP 258 OP 636: Performed by: INTERNAL MEDICINE

## 2021-06-29 PROCEDURE — 36415 COLL VENOUS BLD VENIPUNCTURE: CPT | Performed by: HOSPITALIST

## 2021-06-29 PROCEDURE — 97530 THERAPEUTIC ACTIVITIES: CPT | Mod: GP | Performed by: PHYSICAL THERAPIST

## 2021-06-29 PROCEDURE — 85025 COMPLETE CBC W/AUTO DIFF WBC: CPT | Performed by: INTERNAL MEDICINE

## 2021-06-29 PROCEDURE — 99233 SBSQ HOSP IP/OBS HIGH 50: CPT | Mod: GC | Performed by: PSYCHIATRY & NEUROLOGY

## 2021-06-29 PROCEDURE — 97165 OT EVAL LOW COMPLEX 30 MIN: CPT | Mod: GO | Performed by: OCCUPATIONAL THERAPIST

## 2021-06-29 PROCEDURE — 250N000011 HC RX IP 250 OP 636: Performed by: INTERNAL MEDICINE

## 2021-06-29 PROCEDURE — 85610 PROTHROMBIN TIME: CPT | Performed by: HOSPITALIST

## 2021-06-29 PROCEDURE — 84132 ASSAY OF SERUM POTASSIUM: CPT | Performed by: INTERNAL MEDICINE

## 2021-06-29 RX ORDER — POTASSIUM CHLORIDE 1.5 G/1.58G
20 POWDER, FOR SOLUTION ORAL ONCE
Status: COMPLETED | OUTPATIENT
Start: 2021-06-30 | End: 2021-06-30

## 2021-06-29 RX ORDER — POTASSIUM CHLORIDE 1.5 G/1.58G
20 POWDER, FOR SOLUTION ORAL ONCE
Status: COMPLETED | OUTPATIENT
Start: 2021-06-29 | End: 2021-06-29

## 2021-06-29 RX ORDER — WARFARIN SODIUM 3 MG/1
3 TABLET ORAL
Status: COMPLETED | OUTPATIENT
Start: 2021-06-29 | End: 2021-06-29

## 2021-06-29 RX ORDER — PANTOPRAZOLE SODIUM 40 MG/1
40 TABLET, DELAYED RELEASE ORAL
Status: DISCONTINUED | OUTPATIENT
Start: 2021-06-29 | End: 2021-07-08 | Stop reason: HOSPADM

## 2021-06-29 RX ADMIN — CYANOCOBALAMIN TAB 1000 MCG 1000 MCG: 1000 TAB at 09:32

## 2021-06-29 RX ADMIN — PIPERACILLIN SODIUM AND TAZOBACTAM SODIUM 2.25 G: 2; .25 INJECTION, POWDER, LYOPHILIZED, FOR SOLUTION INTRAVENOUS at 17:18

## 2021-06-29 RX ADMIN — PIPERACILLIN SODIUM AND TAZOBACTAM SODIUM 2.25 G: 2; .25 INJECTION, POWDER, LYOPHILIZED, FOR SOLUTION INTRAVENOUS at 11:55

## 2021-06-29 RX ADMIN — METOPROLOL TARTRATE 12.5 MG: 25 TABLET, FILM COATED ORAL at 20:04

## 2021-06-29 RX ADMIN — PANTOPRAZOLE SODIUM 40 MG: 40 INJECTION, POWDER, FOR SOLUTION INTRAVENOUS at 09:39

## 2021-06-29 RX ADMIN — PIPERACILLIN SODIUM AND TAZOBACTAM SODIUM 2.25 G: 2; .25 INJECTION, POWDER, LYOPHILIZED, FOR SOLUTION INTRAVENOUS at 04:57

## 2021-06-29 RX ADMIN — METOPROLOL TARTRATE 12.5 MG: 25 TABLET, FILM COATED ORAL at 09:34

## 2021-06-29 RX ADMIN — PIPERACILLIN SODIUM AND TAZOBACTAM SODIUM 2.25 G: 2; .25 INJECTION, POWDER, LYOPHILIZED, FOR SOLUTION INTRAVENOUS at 23:50

## 2021-06-29 RX ADMIN — WARFARIN SODIUM 3 MG: 3 TABLET ORAL at 17:19

## 2021-06-29 RX ADMIN — GABAPENTIN 200 MG: 100 CAPSULE ORAL at 21:45

## 2021-06-29 RX ADMIN — ATORVASTATIN CALCIUM 20 MG: 20 TABLET, FILM COATED ORAL at 20:04

## 2021-06-29 RX ADMIN — POTASSIUM CHLORIDE 20 MEQ: 1.5 POWDER, FOR SOLUTION ORAL at 11:55

## 2021-06-29 RX ADMIN — PANTOPRAZOLE SODIUM 40 MG: 40 TABLET, DELAYED RELEASE ORAL at 15:39

## 2021-06-29 RX ADMIN — SODIUM PHOSPHATE, MONOBASIC, MONOHYDRATE 9 MMOL: 276; 142 INJECTION, SOLUTION INTRAVENOUS at 13:19

## 2021-06-29 RX ADMIN — FOLIC ACID 2000 MCG: 1 TABLET ORAL at 09:31

## 2021-06-29 RX ADMIN — ENOXAPARIN SODIUM 80 MG: 80 INJECTION SUBCUTANEOUS at 15:39

## 2021-06-29 ASSESSMENT — ACTIVITIES OF DAILY LIVING (ADL)
ADLS_ACUITY_SCORE: 27
ADLS_ACUITY_SCORE: 25
ADLS_ACUITY_SCORE: 27
ADLS_ACUITY_SCORE: 25

## 2021-06-29 NOTE — PLAN OF CARE
A and O x3-4, off on day of month intermittently. Follows all directions, calm and cooperative. Forgetful. Low BP, VS otherwise stable. Generalized edema and edema to all 4 extremities. No N/T. Weak pedal pulses, location confirmed with doppler. R facial droop. RLE 2/5 strength,  LLE 3/5 strength. Up to chair and commode with 2, belt, and Yvette Steady. Continent/incontinent. BM on commode. Poor appetite, on DD2 diet with nectar thick liquids. Sons at bedside. Potassium and phosphorus replacement given. Scoring green on aggression screening tool.

## 2021-06-29 NOTE — PROGRESS NOTES
06/29/21 1400   Quick Adds   Type of Visit Initial Occupational Therapy Evaluation   Living Environment   People in home child(kade), adult  (dtr.--Karen)   Current Living Arrangements house   Transportation Anticipated family or friend will provide   Living Environment Comments Pt. reports grab bars in bathroom, higher toilet;pt. reports she sponge-baths, also [facility] aide 1X/week for bathing   Self-Care   Usual Activity Tolerance fair   Current Activity Tolerance fair   Regular Exercise   (per chart, home PT)   Equipment Currently Used at Home grab bar, toilet;lift device;raised toilet seat;walker, rolling;wheelchair, manual   Activity/Exercise/Self-Care Comment Per pt., sleeps in recliner, needs assist X 1 for transfer to WC;able to pivot transfer to toilet;pt. rerpots she is able to dress UE and partial LE;dtrs. do all the home mgmt. tasks and assist w/ medication mgmt.;pt. reports she is never alone. Pt.'s 2 dtr.s' stay w/ her; Per PT note:per home PT note  pt CGA w/ FWW bed/chair to WC and walking a few steps w A to BR, short distance amb in room, B foot drop using B AFO with mobility   Disability/Function   Wear Glasses or Blind yes   Vision Management glasses   Concentrating, Remembering or Making Decisions Difficulty   (h/o dementia)   Walking or Climbing Stairs Difficulty yes   Mobility Management WC, WW  for short distances   Dressing/Bathing Difficulty yes   Dressing/Bathing bathing difficulty, requires equipment;bathing difficulty, assistance 1 person;dressing difficulty, assistance 1 person  (assist for LE dressing + bathing)   Toileting issues yes   Toileting Assistance toileting difficulty, requires equipment;toileting difficulty, assistance 1 person   Fall history within last six months no   Change in Functional Status Since Onset of Current Illness/Injury no   General Information   Onset of Illness/Injury or Date of Surgery 06/26/21   Referring Physician Jean Grubbs MD    Patient/Family  Therapy Goal Statement (OT) not stated   Additional Occupational Profile Info/Pertinent History of Current Problem OT:89 year old female with past medical hx of Hypertension, chronic afib on eliquis but not taking it recently because of the recent GI bleeding secondary to 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 typically sits in recliner during the day, has home health aide for bathing, home PT   Existing Precautions/Restrictions fall   Limitations/Impairments safety/cognitive  (h/o dementia)   General Observations and Info Pt. up in [facility] upon arrival, agreeable to OT, leaning slightly to the R   Cognitive Status Examination   Orientation Status orientation to person, place and time   Follows Commands follows one-step commands;75-90% accuracy   Memory Deficit   (h/o dementia)   Cognitive Status Comments will monitor, complete further testing as indicated   Visual Perception   Impact of Vision Impairment on Function (Vision) wears glasses;visual tracking=WNL;no vision problems noted/reported   Sensory   Sensory Comments llight touch intact;no numbness/tingling noted/reported   Pain Assessment   Patient Currently in Pain No   Posture   Posture forward head position;protracted shoulders   Range of Motion Comprehensive   Comment, General Range of Motion BUE WFL   Strength Comprehensive (MMT)   Comment, General Manual Muscle Testing (MMT) Assessment RUE:grossly 4/5 shoulder/elbow strength LUE: grossly 5/5 shoulder strength  (pt. reported R side feels stronger)   Coordination   Upper Extremity Coordination Right UE impaired  (mild R gross incoord. noted)   Coordination Comments  Overall, WFL;some difficulty w/ finger-to-nose RUE   Bed Mobility   Comment (Bed Mobility) NT   Transfers   Transfer Comments sit-stand w/ mod.-max. A X 2   Balance   Balance Comments impaired   Lower Body Dressing Assessment/Training   [place] Level (Lower Body Dressing) dependent (less than 25% patient effort)   Toileting   [place] Level (Toileting) dependent (less than 25% patient effort)   Instrumental Activities of Daily Living (IADL)   Previous Responsibilities   (dtr.'s manage household tasks and medication)   Clinical Impression   Criteria for Skilled Therapeutic Interventions Met (OT) yes   OT Diagnosis Decline in ADL performance   OT Problem List-Impairments impacting ADL problems related to;activity tolerance impaired;balance;cognition;mobility;strength   Assessment of Occupational Performance 3-5 Performance Deficits   Identified Performance Deficits dressing, toileting, grooming, fx. transfers   Planned Therapy Interventions (OT) ADL retraining;cognition;motor coordination training;neuromuscular re-education;strengthening   Clinical Decision Making Complexity (OT) low complexity   Therapy Frequency (OT) Daily   Predicted Duration of Therapy 3-5 days   Risk & Benefits of therapy have been explained evaluation/treatment results reviewed;care plan/treatment goals reviewed;risks/benefits reviewed;current/potential barriers reviewed;participants voiced agreement with care plan;participants included;patient   OT Discharge Planning    OT Discharge Recommendation (DC Rec) Transitional Care Facility   OT Rationale for DC Rec Pt. currently below baseline for fx. transfer + ADL's, needing A X 2;pt. would benefit from daily skilled OT intervention to maximize safely/indep. prior to discharge home.    OT Brief overview of current status  mod.-max. A X 2 for sit-stand pivot transfer from recliner to [facility], dep. for incont. brief mgmt./toileting hyg.    Total Evaluation Time (Minutes)   Total Evaluation Time  (Minutes) 9

## 2021-06-29 NOTE — PHARMACY
Date: 6/29/2021    Time of Call: 2:01 PM     Provider:  Melissa Abbott MD     Follow-up/additional notes: Clarified Lovenox bridge dosing as 1 mg/kg subcutaneously q 12 hrs. Discussed adjusting dose to 1 mg/kg subcutaneously q 24 hrs for renal function (CrCl < 30 mL/min). Received verbal order to keep dose as 1 mg/kg q 12 hrs for short term bridge.

## 2021-06-29 NOTE — PROVIDER NOTIFICATION
Notified Dr Grubbs [facility] ER called with blood culture report...The patient left the office before the visit was finished. And right growing intercocous saecalis.

## 2021-06-29 NOTE — PROVIDER NOTIFICATION
Orders received to stop heparin gtt and start eliquis. Neurology note states that pt will transition from heparin gtt to lovenox and coumadin. Page sent to Dr. Parr to clarify orders.

## 2021-06-29 NOTE — PLAN OF CARE
Pt here with basilar artery thrombus. A&Ox4. Neuros R facial droop, R tongue deviation, generalized weakness, BLE 2/5. Confusion/forgetful at times but easily redirectable. VSS. Heparin gtt running at 7.5 ml/hr. Tele A-fib CVR. DD2 diet, nectar thick liquids. Takes pills whole with thickened water. Not up overnight. Voiding in bedpan. Denies pain. Pt scoring green on the Aggression Stop Light Tool. Discharge plan pending.

## 2021-06-29 NOTE — CONSULTS
Care Management Initial Consult    General Information  Assessment completed with: Care Team Member, FamilyGuilherme  Type of CM/SW Visit: Initial Assessment    Primary Care Provider verified and updated as needed:     Readmission within the last 30 days:        Reason for Consult: discharge planning  Advance Care Planning:     None in chart     Communication Assessment  Patient's communication style: spoken language (English or Bilingual)    Hearing Difficulty or Deaf: no   Wear Glasses or Blind: yes    Cognitive  Cognitive/Neuro/Behavioral: .WDL except  Level of Consciousness: alert  Arousal Level: opens eyes spontaneously  Orientation: disoriented to, time  Mood/Behavior: calm, cooperative  Best Language: 0 - No aphasia  Speech: slurred, logical    Living Environment:   People in home: child(kade), adult     Current living Arrangements: house      Able to return to prior arrangements:  Pt's two daughters take turns staying with her  Antonette is there over the weekends and  Karen is with pt during the week days.  It is recommended that pt go to TCU for strengthening so she is able to increase independence with transfers. She uses a wheelchair at home.    Family/Social Support:  Care provided by: child(kade)  Provides care for:    Marital Status:              Description of Support System:  Pt has support of her adult children and lives with one daughter    Current Resources:   Patient receiving home care services:       Community Resources:    Equipment currently used at home: walker, standard, wheelchair, manual, lift device  Supplies currently used at home:      Employment/Financial:  Employment Status:          Financial Concerns:   medicare       Lifestyle & Psychosocial Needs:        Socioeconomic History     Marital status:      Spouse name: Not on file     Number of children: 5     Years of education: Not on file     Highest education level: Not on file     Tobacco Use     Smoking status: Never  Smoker   Substance and Sexual Activity     Alcohol use: Not Currently       Functional Status:  Prior to admission patient needed assistance: Pt needed the assistance of one with transfers. Pt uses wheelchair at home.  Pt is needing the assistance of 2 currently and has been recommended for TCU placement.    Mental Health Status:       Chemical Dependency Status:           Values/Beliefs:  Spiritual, Cultural Beliefs, Muslim Practices, Values that affect care:                 Additional Information:     Referral sent to [facility] TCU per request. Pt has been there before.  SW will follow up additional referrals if needed.    SUZY Moon  [facility]  [phone]

## 2021-06-30 ENCOUNTER — APPOINTMENT (OUTPATIENT)
Dept: OCCUPATIONAL THERAPY | Facility: CLINIC | Age: 86
DRG: 288 | End: 2021-06-30
Attending: INTERNAL MEDICINE
Payer: MEDICARE

## 2021-06-30 ENCOUNTER — APPOINTMENT (OUTPATIENT)
Dept: PHYSICAL THERAPY | Facility: CLINIC | Age: 86
DRG: 288 | End: 2021-06-30
Attending: HOSPITALIST
Payer: MEDICARE

## 2021-06-30 LAB
ALBUMIN SERPL-MCNC: 1.5 G/DL (ref 3.4–5)
ANION GAP SERPL CALCULATED.3IONS-SCNC: 6 MMOL/L (ref 3–14)
APTT PPP: 50 SEC (ref 22–37)
BASOPHILS # BLD AUTO: 0.1 10E9/L (ref 0–0.2)
BASOPHILS NFR BLD AUTO: 0.8 %
BUN SERPL-MCNC: 11 MG/DL (ref 7–30)
CALCIUM SERPL-MCNC: 7.8 MG/DL (ref 8.5–10.1)
CHLORIDE SERPL-SCNC: 118 MMOL/L (ref 94–109)
CO2 SERPL-SCNC: 22 MMOL/L (ref 20–32)
CREAT SERPL-MCNC: 1.28 MG/DL (ref 0.52–1.04)
DIFFERENTIAL METHOD BLD: ABNORMAL
EOSINOPHIL # BLD AUTO: 0.2 10E9/L (ref 0–0.7)
EOSINOPHIL NFR BLD AUTO: 2.1 %
ERYTHROCYTE [DISTWIDTH] IN BLOOD BY AUTOMATED COUNT: 17 % (ref 10–15)
GFR SERPL CREATININE-BSD FRML MDRD: 37 ML/MIN/{1.73_M2}
GLUCOSE SERPL-MCNC: 92 MG/DL (ref 70–99)
HCT VFR BLD AUTO: 25.9 % (ref 35–47)
HGB BLD-MCNC: 7.7 G/DL (ref 11.7–15.7)
IMM GRANULOCYTES # BLD: 0.1 10E9/L (ref 0–0.4)
IMM GRANULOCYTES NFR BLD: 0.7 %
INR PPP: 1.5 (ref 0.86–1.14)
LYMPHOCYTES # BLD AUTO: 1.6 10E9/L (ref 0.8–5.3)
LYMPHOCYTES NFR BLD AUTO: 21.8 %
MAGNESIUM SERPL-MCNC: 1.7 MG/DL (ref 1.6–2.3)
MCH RBC QN AUTO: 25.6 PG (ref 26.5–33)
MCHC RBC AUTO-ENTMCNC: 29.7 G/DL (ref 31.5–36.5)
MCV RBC AUTO: 86 FL (ref 78–100)
MONOCYTES # BLD AUTO: 0.5 10E9/L (ref 0–1.3)
MONOCYTES NFR BLD AUTO: 6.9 %
NEUTROPHILS # BLD AUTO: 4.8 10E9/L (ref 1.6–8.3)
NEUTROPHILS NFR BLD AUTO: 67.7 %
NRBC # BLD AUTO: 0 10*3/UL
NRBC BLD AUTO-RTO: 0 /100
PHOSPHATE SERPL-MCNC: 2.9 MG/DL (ref 2.5–4.5)
PLATELET # BLD AUTO: 221 10E9/L (ref 150–450)
POTASSIUM SERPL-SCNC: 3.3 MMOL/L (ref 3.4–5.3)
POTASSIUM SERPL-SCNC: 3.8 MMOL/L (ref 3.4–5.3)
RBC # BLD AUTO: 3.01 10E12/L (ref 3.8–5.2)
SODIUM SERPL-SCNC: 146 MMOL/L (ref 133–144)
WBC # BLD AUTO: 7.1 10E9/L (ref 4–11)

## 2021-06-30 PROCEDURE — 99231 SBSQ HOSP IP/OBS SF/LOW 25: CPT | Mod: GC | Performed by: PSYCHIATRY & NEUROLOGY

## 2021-06-30 PROCEDURE — 250N000011 HC RX IP 250 OP 636: Performed by: INTERNAL MEDICINE

## 2021-06-30 PROCEDURE — 250N000013 HC RX MED GY IP 250 OP 250 PS 637: Performed by: INTERNAL MEDICINE

## 2021-06-30 PROCEDURE — 36415 COLL VENOUS BLD VENIPUNCTURE: CPT | Performed by: INTERNAL MEDICINE

## 2021-06-30 PROCEDURE — 97530 THERAPEUTIC ACTIVITIES: CPT | Mod: GP

## 2021-06-30 PROCEDURE — 85730 THROMBOPLASTIN TIME PARTIAL: CPT | Performed by: INTERNAL MEDICINE

## 2021-06-30 PROCEDURE — 87040 BLOOD CULTURE FOR BACTERIA: CPT | Performed by: INTERNAL MEDICINE

## 2021-06-30 PROCEDURE — 250N000013 HC RX MED GY IP 250 OP 250 PS 637: Performed by: STUDENT IN AN ORGANIZED HEALTH CARE EDUCATION/TRAINING PROGRAM

## 2021-06-30 PROCEDURE — 87077 CULTURE AEROBIC IDENTIFY: CPT | Performed by: INTERNAL MEDICINE

## 2021-06-30 PROCEDURE — 97530 THERAPEUTIC ACTIVITIES: CPT | Mod: GO

## 2021-06-30 PROCEDURE — 85025 COMPLETE CBC W/AUTO DIFF WBC: CPT | Performed by: INTERNAL MEDICINE

## 2021-06-30 PROCEDURE — 83735 ASSAY OF MAGNESIUM: CPT | Performed by: INTERNAL MEDICINE

## 2021-06-30 PROCEDURE — 84132 ASSAY OF SERUM POTASSIUM: CPT | Performed by: INTERNAL MEDICINE

## 2021-06-30 PROCEDURE — 250N000011 HC RX IP 250 OP 636: Performed by: STUDENT IN AN ORGANIZED HEALTH CARE EDUCATION/TRAINING PROGRAM

## 2021-06-30 PROCEDURE — 99233 SBSQ HOSP IP/OBS HIGH 50: CPT | Performed by: INTERNAL MEDICINE

## 2021-06-30 PROCEDURE — 84100 ASSAY OF PHOSPHORUS: CPT | Performed by: INTERNAL MEDICINE

## 2021-06-30 PROCEDURE — 120N000001 HC R&B MED SURG/OB

## 2021-06-30 PROCEDURE — 80069 RENAL FUNCTION PANEL: CPT | Performed by: INTERNAL MEDICINE

## 2021-06-30 PROCEDURE — 85610 PROTHROMBIN TIME: CPT | Performed by: INTERNAL MEDICINE

## 2021-06-30 RX ORDER — POTASSIUM CHLORIDE 1.5 G/1.58G
20 POWDER, FOR SOLUTION ORAL ONCE
Status: COMPLETED | OUTPATIENT
Start: 2021-06-30 | End: 2021-06-30

## 2021-06-30 RX ORDER — WARFARIN SODIUM 3 MG/1
3 TABLET ORAL
Status: COMPLETED | OUTPATIENT
Start: 2021-06-30 | End: 2021-06-30

## 2021-06-30 RX ORDER — CEFTRIAXONE 2 G/1
2 INJECTION, POWDER, FOR SOLUTION INTRAMUSCULAR; INTRAVENOUS EVERY 24 HOURS
Status: DISCONTINUED | OUTPATIENT
Start: 2021-06-30 | End: 2021-07-03

## 2021-06-30 RX ORDER — AMPICILLIN 2 G/1
2 INJECTION, POWDER, FOR SOLUTION INTRAVENOUS EVERY 6 HOURS
Status: DISCONTINUED | OUTPATIENT
Start: 2021-06-30 | End: 2021-07-08 | Stop reason: HOSPADM

## 2021-06-30 RX ORDER — POTASSIUM CHLORIDE 20MEQ/15ML
20 LIQUID (ML) ORAL ONCE
Status: DISCONTINUED | OUTPATIENT
Start: 2021-06-30 | End: 2021-06-30

## 2021-06-30 RX ADMIN — CEFTRIAXONE SODIUM 2 G: 2 INJECTION, POWDER, FOR SOLUTION INTRAMUSCULAR; INTRAVENOUS at 16:50

## 2021-06-30 RX ADMIN — PIPERACILLIN SODIUM AND TAZOBACTAM SODIUM 2.25 G: 2; .25 INJECTION, POWDER, LYOPHILIZED, FOR SOLUTION INTRAVENOUS at 05:35

## 2021-06-30 RX ADMIN — PANTOPRAZOLE SODIUM 40 MG: 40 TABLET, DELAYED RELEASE ORAL at 16:50

## 2021-06-30 RX ADMIN — AMPICILLIN SODIUM 2 G: 2 INJECTION, POWDER, FOR SOLUTION INTRAVENOUS at 23:59

## 2021-06-30 RX ADMIN — METOPROLOL TARTRATE 12.5 MG: 25 TABLET, FILM COATED ORAL at 21:12

## 2021-06-30 RX ADMIN — ENOXAPARIN SODIUM 80 MG: 80 INJECTION SUBCUTANEOUS at 16:49

## 2021-06-30 RX ADMIN — FOLIC ACID 2000 MCG: 1 TABLET ORAL at 08:22

## 2021-06-30 RX ADMIN — METOPROLOL TARTRATE 12.5 MG: 25 TABLET, FILM COATED ORAL at 08:23

## 2021-06-30 RX ADMIN — AMPICILLIN SODIUM 2 G: 2 INJECTION, POWDER, FOR SOLUTION INTRAVENOUS at 17:52

## 2021-06-30 RX ADMIN — PIPERACILLIN SODIUM AND TAZOBACTAM SODIUM 2.25 G: 2; .25 INJECTION, POWDER, LYOPHILIZED, FOR SOLUTION INTRAVENOUS at 12:16

## 2021-06-30 RX ADMIN — PANTOPRAZOLE SODIUM 40 MG: 40 TABLET, DELAYED RELEASE ORAL at 08:23

## 2021-06-30 RX ADMIN — POTASSIUM CHLORIDE 20 MEQ: 1.5 POWDER, FOR SOLUTION ORAL at 08:21

## 2021-06-30 RX ADMIN — WARFARIN SODIUM 3 MG: 3 TABLET ORAL at 17:02

## 2021-06-30 RX ADMIN — CYANOCOBALAMIN TAB 1000 MCG 1000 MCG: 1000 TAB at 08:22

## 2021-06-30 RX ADMIN — ATORVASTATIN CALCIUM 20 MG: 20 TABLET, FILM COATED ORAL at 21:12

## 2021-06-30 RX ADMIN — POTASSIUM CHLORIDE 20 MEQ: 1.5 POWDER, FOR SOLUTION ORAL at 00:05

## 2021-06-30 RX ADMIN — GABAPENTIN 200 MG: 100 CAPSULE ORAL at 21:12

## 2021-06-30 RX ADMIN — ENOXAPARIN SODIUM 80 MG: 80 INJECTION SUBCUTANEOUS at 02:06

## 2021-06-30 ASSESSMENT — ACTIVITIES OF DAILY LIVING (ADL)
ADLS_ACUITY_SCORE: 25
ADLS_ACUITY_SCORE: 23
ADLS_ACUITY_SCORE: 23
ADLS_ACUITY_SCORE: 25

## 2021-06-30 NOTE — PROGRESS NOTES
Care Management Follow Up    Length of Stay (days): 4    Expected Discharge Date: 07/02/21     Concerns to be Addressed: discharge planning    Patient plan of care discussed at interdisciplinary rounds: Yes    Anticipated Discharge Disposition: Transitional Care/LTC     Anticipated Discharge Services:    Anticipated Discharge DME:      Patient/family educated on Medicare website which has current facility and service quality ratings: yes  Education Provided on the Discharge Plan:    Patient/Family in Agreement with the Plan: yes    Referrals Placed by CM/SW:    Private pay costs discussed: Not applicable    Additional Information:  Spoke with pt's son, Guilherme and his wife. Pt will most likely need to stay LTC when she discharges to a TCU. However they are concerned about how she will pay for it. They have an appointment for a [place] assessment in July. Explained MA will pay for room and board in LTC and the SNF SW will assist with this process. However, she will qualify for Medicare initially. [facility] is willing to assess pt for their LTC unit. Family also expressed interest in [facility]. Referrals pending.      CANDICE Nazario, LGSW  [phone]  [facility]

## 2021-06-30 NOTE — PROGRESS NOTES
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 , S/P bioprosthetic AVR and Ascending Aorta tube graft for aneurysm, recurrent UTI's -- wheelchair bound, presented to [facility] ER this AM after waking up with new right face droop, and transferred to [facility]    Assessment & Plan          Acute Ischemic Stroke secondary to    Basilar artery thrombosis --  This is a 89 year old female, history of Afib not recently on Eliquis because of recent GI bleeding, presented with right facial droop,  Initial CT head negative for acute stroke, CTA head and neck done shows new filling defect within the basilar artery, concerning for partially occlusive thrombus.No evidence of significant stenosis in either internal carotid arteries.     No invasive intervention recommended by the Neurology, started on low intensity IV Heparin, strict bed rest, every 1 hour neuro check. ICU monitoring, permissive Hypertension, remain stable at this time,     At this time will order Echocardiogram with bubble study still pending, check Lipid panel, start on Lipitor 40 mg daily, LDL back 31 only, decrease Lipitor to 20 mg. Speech, PT and OT consulted to evaluate. Most likely is cardio embolic. Repeat CT head shows Possible subacute ischemic infarct in the right frontal and left occipital lobe, CTA head shows nonocclusive thrombus at the proxima basilar artery remain stable. Continue with IV heparin at this time. MRI of the brain shows acute/early subacute infarct at the right joey/medullary junction No other significant past medical history or family history. No hemorrhage.     Overall stable now, on dysphagia level 2 diet, can switch to oral eliquis , but neurology now recommending Lovenox bridge and Coumadin for  intraluminal thrombosis. She is on Lovenox and coumadin, her Hb slightly decrease today without any obvious bleeding. Will keep close eye if Hb drop further unfortunately may need to stop anticoagulation. With her age, recent GI bleeding and tolerating low intensity heparin but now on full dose lovenox, high risk for GI bleeding.                     Permanent atrial fib --               -- She is on Metoprolol, resume oral Metoprolol 12.5 mg bid and stop IV Metoprolol now.   Eliquis was on hold before admission because of GI bleed, currently on IV Heparin, Neurology started her on Lovenox and coumadin      Possible Community acquired Pneumonia vs Aspiration Pneumonia.   High grade Enterococcus fecalis bacteremia   She reportedly had fever of 101.5 at outside hospital, her Xray chest on admission shows patchy bibasilar opacities suspicious for Pneumonic infiltrate. She was started on IV Zosyn,  Agree with that.   Patient is now Afebrile, Procalcitonin level elevated and BC positive 2 of 2 from [facility],now growing enterococcus fecalis, echo negative for vegetation, afebrile now, repeat blood culture on 6/28 remain negative so far. She is on IV Zosyn, will continue with that as sensitive to ampicillin. .  Check MRSA screen negative, no need for Vancomycin at this time.     Repeat blood culture on 6/29 is positive on same day despite been on IV Zosyn for few days, this is high grade bacteremia, need to rule out Endocarditis given AVR. Consult ID to evaluate her, may change Zosyn to Ampicillin now, she will need at least 6 weeks of IV antibiotics.        Acute Renal Failure.   Patient has recently elevated creatinine in the range of 1.2-1.4, but in may of 2021 and march of 2021 she has normal creatinine. Most likely develop ARF during her admission with GI bleeding and likely in ATN at this time.   She was on IV fluids NS at 75 ml/hr as received IV contrast twice for CTA head and neck and perfusion,.   Creatinine on  admission was 1.5 now improve to 1.28 at this time. Continue to monitor.   Avoid NSAID's and nephrotoxic medications.   Stop IV fluids now, creatinine remain at her new baseline.        Chronic Anemia   H/O Recent Upper GI bleeding secondary to Angio ectasia   Patient appears to have chronic anemia with recent acute on chronic anemia secondary to GI bleeding because of   duodenal angioectasia s/p clip placement. Her Hb is now stable. She is tolerating IV heparin and Hb improve to 8.7 today .   She is currently on IV Protonix 40 bid, agree with that. Hb remain stable   Switch Protonix to oral now.          History of stroke   prior old Right frontal, and recently left Cerebellar on 4/8/21, and unable to walk related to left leg weakness  Wheel chair bound.        Benign essential hypertension  Reasonably control at this time. On Metoprolol 12.5 mg bid at home.   Restart oral metoprolol 12.5 mg bid.           S/P AVR & Ascending Aortic Aneur repair   Stable.    Bilateral Lower Extremity edema and Weakness.   She has 1+ LE edema, most likely chronic, improve now.   US Venous Bilateral LE negative for DVT  echocardiogram ordered and reviewed.     Hypokalemia/Hypernatremia.   Replace potassium today as per protocol.  Hypernatremia likely because of NS  Stop IV fluids now. Continue to follow         ID consult today  Need to rule out Endocarditis, given high grade bacteremia and AVR history   Look for any GI bleeding, repeat Labs in AM. Repeat blood culture today      Discussed with  the nursing staff taking care of the patient today          DVT Prophylaxis: Heparin IV  Code Status: No CPR- Do NOT Intubate    Disposition: Expected discharge TBD    Jean Grubbs MD  Text Page  (7am - 6pm)    Interval History   Patient seen and evaluated in her room today, feeling well, denies any fever, chills, chest pain, SOB, headache or dizziness, no nausea, vomiting, hematemesis, melena or hematochezia at this time.     Patient repeat  blood culture here 1 of 2 positive for enterococcus fecalis     No other significant event.       -Data reviewed today: I reviewed all new labs and imaging results over the last 24 hours. I personally reviewed no images or EKG's today.    Physical Exam   Temp: 98.3  F (36.8  C) Temp src: Oral BP: 104/44 Pulse: 85   Resp: 16 SpO2: 93 % O2 Device: None (Room air)    Vitals:    06/27/21 0700 06/28/21 0400 06/28/21 1704   Weight: 81.6 kg (179 lb 14.3 oz) 83.6 kg (184 lb 4.9 oz) 80.1 kg (176 lb 9.4 oz)     Vital Signs with Ranges  Temp:  [97.5  F (36.4  C)-98.3  F (36.8  C)] 98.3  F (36.8  C)  Pulse:  [69-85] 85  Resp:  [16-17] 16  BP: (101-127)/(44-59) 104/44  SpO2:  [92 %-98 %] 93 %  I/O last 3 completed shifts:  In: 820 [P.O.:820]  Out: -     Constitutional: awake, alert, cooperative, no apparent distress, and appears stated age  Eyes: Lids and lashes normal, pupils equal, round and reactive to light, extra ocular muscles intact, sclera clear, conjunctiva normal  Respiratory: No increased work of breathing, good air exchange, clear to auscultation bilaterally, no crackles or wheezing  Cardiovascular: Normal apical impulse, regular rate and rhythm, normal S1 and S2, no S3 or S4, and no murmur noted  GI: No scars, normal bowel sounds, soft, non-distended, non-tender, no masses palpated, no hepatosplenomegally  Musculoskeletal: 2+ bilateral  lower extremity pitting edema present  Neurologic: awake, alert and following command, has right facial droop which is now improve,  Moving both upper extremities, weakness of both lower extremities.     Medications     - MEDICATION INSTRUCTIONS -       Warfarin Therapy Reminder         ampicillin  2 g Intravenous Q6H     atorvastatin  20 mg Oral QPM     cyanocobalamin  1,000 mcg Oral Daily     enoxaparin ANTICOAGULANT  1 mg/kg Subcutaneous Q12H     folic acid  2,000 mcg Oral Daily     gabapentin  200 mg Oral At Bedtime     gadobutrol  8 mL Intravenous Once     metoprolol tartrate   12.5 mg Oral BID     pantoprazole  40 mg Oral BID AC     sodium chloride 0.9 %  100 mL As instructed Once     sodium chloride (PF)  3 mL Intracatheter Q8H     warfarin ANTICOAGULANT  3 mg Oral or Feeding Tube ONCE at 18:00       Data   Recent Labs   Lab 06/30/21  0619 06/29/21  2308 06/29/21  1507 06/29/21  0741 06/28/21  0855 06/28/21  0855 06/28/21  0744   WBC 7.1  --   --  8.0  --   --  11.4*   HGB 7.7*  --   --  8.2*  --   --  9.2*   MCV 86  --   --  85  --   --  83     --   --  252  --   --  304   INR 1.50*  --  1.30*  --   --   --   --    *  --   --  145*  --  145* 144   POTASSIUM 3.3* 3.3*  --  3.2*   < > 3.2* 3.3*   CHLORIDE 118*  --   --  116*  --  117* 115*   CO2 22  --   --  22  --  21 22   BUN 11  --   --  12  --  13 14   CR 1.28*  --   --  1.37*  --  1.36* 1.42*   ANIONGAP 6  --   --  7  --  7 7   MANDI 7.8*  --   --  7.8*  --  7.7* 7.8*   GLC 92  --   --  103*  --  118* 106*   ALBUMIN 1.5*  --   --  1.5*  --  1.6* 1.6*   PROTTOTAL  --   --   --   --   --  6.3*  --    BILITOTAL  --   --   --   --   --  0.3  --    ALKPHOS  --   --   --   --   --  77  --    ALT  --   --   --   --   --  13  --    AST  --   --   --   --   --  16  --     < > = values in this interval not displayed.       No results found for this or any previous visit (from the past 24 hour(s)).

## 2021-06-30 NOTE — PROVIDER NOTIFICATION
MD Notification    Notified Person: MD    Notified Person Name: Neisha    Notification Date/Time: 6/29/21 2139    Notification Interaction: Critical lab: +BC, in L hand, gram+ cocci in pairs and chains    Purpose of Notification: Critical value     Orders Received: Continue on Zosyn    Comments:

## 2021-06-30 NOTE — CONSULTS
ID consult dictated IMP1 88 yo female with new CVI on AC, recent ? UTIs(UC multiple orgs,) prior AV and gaft, high grade enteroccus bacteremia vrtual certainty PVE with associated emboli    REc Serial Bc to amp needs 6 week course  TTE neg but definitely Needs MARK ANTHONY

## 2021-06-30 NOTE — PROGRESS NOTES
"   [facility]    Stroke Progress Note    Interval Events  Clinically stable overnight. Tolerating lovenox/warfarin start without complication. Being treated for E. Faecalis bacteremia.    Impression  1. 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 If after a few months, follow up CTA demonstrates resolution of thrombus then can transition back to DOAC in the outpatient setting.    Stroke Evaluation summarized:  MRI/Head CT: R pontomedullary junction infarct  Intracranial Vascular Imaging: partially occlusive thrombus of the proximal basilar artery  Cervical Carotid and Vertebral Artery Vascular Imaging: no stenoses  Echocardiogram: LVEF 55-60%, severe LA enlargement, moderate tricuspid regurg  EKG/Telemetry: a fib  LDL: 31  A1c: 5.8  Troponin: not obtained  Other testing: Not Applicable    Plan  - Continue q4 hour neurochecks while inpatient  - Continue therapeutic lovenox bridging to warfarin, goal INR 2-3  - Goal normotension, <130/80 or lower if tolerated for overall reduced cardiovascular risk. Please titrate enteral meds to achieve. IV PRN BP meds only for SBP >180.  - Continue decreased dose atorvastatin 20 mg daily in light of LDL 31 and increased risk of ICH in LDL<40    - Follow up with Stroke Neurology with repeat CTA (ordered for you) in 1-2 months    Vascular Neurology will sign off at this time. Please do not hesitate to contact us with questions or concerns, should they arise.    The Stroke Staff is Dr. Meade.    Melissa Abbott MD  Vascular Neurology Fellow  To page me or covering stroke neurology team member, click here: AMCOM   Choose \"On Call\" tab at top, then search dropdown box for \"Neurology Adult\", select location, " press Enter, then look for stroke/neuro ICU/telestroke.    ______________________________________________________    Medications   Home Meds  Prior to Admission medications    Medication Sig Start Date End Date Taking? Authorizing Provider   acetaminophen (TYLENOL) 325 MG tablet Take 650 mg by mouth every 8 hours as needed for mild pain   Yes Unknown, Entered By History   apixaban ANTICOAGULANT (ELIQUIS) 5 MG tablet Take 5 mg by mouth 2 times daily   Yes Unknown, Entered By History   atorvastatin (LIPITOR) 40 MG tablet Take 40 mg by mouth At Bedtime   Yes Unknown, Entered By History   cyanocobalamin (VITAMIN B-12) 1000 MCG tablet Take 1,000 mcg by mouth daily   Yes Unknown, Entered By History   ferrous sulfate (FEROSUL) 325 (65 Fe) MG tablet Take 325 mg by mouth three times a week Mon - Wed - Fri. Takes with orange juice.   Yes Unknown, Entered By History   folic acid (FOLVITE) 400 MCG tablet Take 2,000 mcg by mouth daily 400 mcg x 5 tablets for 1 month then on 7/18/21 decrease to 400 mcg daily   Yes Unknown, Entered By History   gabapentin (NEURONTIN) 300 MG capsule Take 300 mg by mouth At Bedtime   Yes Unknown, Entered By History   latanoprost (XALATAN) 0.005 % ophthalmic solution Place 1 drop into both eyes At Bedtime   Yes Unknown, Entered By History   metoprolol tartrate (LOPRESSOR) 25 MG tablet Take 12.5 mg by mouth 2 times daily   Yes Unknown, Entered By History   mirtazapine (REMERON SOL-TAB) 15 MG ODT 15 mg by Orally disintegrating tablet route At Bedtime   Yes Unknown, Entered By History   omeprazole (PRILOSEC) 20 MG DR capsule Take 20 mg by mouth daily   Yes Unknown, Entered By History   ondansetron (ZOFRAN-ODT) 4 MG ODT tab Take 4 mg by mouth every morning   Yes Unknown, Entered By History   ondansetron (ZOFRAN-ODT) 4 MG ODT tab Take 4 mg by mouth 2 times daily as needed for nausea   Yes Unknown, Entered By History       Scheduled Meds    atorvastatin  20 mg Oral QPM     cyanocobalamin  1,000 mcg Oral  Daily     enoxaparin ANTICOAGULANT  1 mg/kg Subcutaneous Q12H     folic acid  2,000 mcg Oral Daily     gabapentin  200 mg Oral At Bedtime     gadobutrol  8 mL Intravenous Once     metoprolol tartrate  12.5 mg Oral BID     pantoprazole  40 mg Oral BID AC     piperacillin-tazobactam  2.25 g Intravenous Q6H     sodium chloride 0.9 %  100 mL As instructed Once     sodium chloride (PF)  3 mL Intracatheter Q8H     warfarin ANTICOAGULANT  3 mg Oral or Feeding Tube ONCE at 18:00       Infusion Meds    - MEDICATION INSTRUCTIONS -       Warfarin Therapy Reminder         PRN Meds  acetaminophen, lidocaine 4%, lidocaine (buffered or not buffered), melatonin, ondansetron **OR** ondansetron, - MEDICATION INSTRUCTIONS -, sodium chloride (PF), Warfarin Therapy Reminder       PHYSICAL EXAMINATION  Temp:  [97.5  F (36.4  C)-98.1  F (36.7  C)] 98.1  F (36.7  C)  Pulse:  [69-85] 85  Resp:  [16-17] 17  BP: (101-127)/(49-59) 127/59  SpO2:  [92 %-98 %] 95 %     General:  patient lying in bed without any acute distress    HEENT:  normocephalic/atraumatic, edentulous  Cardio:  irregularly irregular  Pulmonary:  no respiratory distress  Abdomen:  soft, non-tender, non-distended  Extremities:  no edema, peripheral pulses palpable  Skin:  intact, warm/dry      Neurologic  Mental Status:  Eyes open spontaneously, alert and attentive, oriented to self, time, place and circumstnce and hospital, but not time or circumstance. Spontaneous language is fluent and coherent with intact comprehension of simple commands, naming, and repetition.  Cranial Nerves:  VFF, pupils 2 mm, round, minimally reactive, dysconjugate gaze at rest, L eye exotropia, right facial droop, mild dysarthria  Motor:  normal muscle tone and bulk, no abnormal movements, able to move all limbs spontaneously, no drift BUEs, minimal antigravity strength BLEs, wiggles toes readily  Reflexes:  no clonus, toes mute  Sensory:  detects noxious in 4/4 extremities  Coordination:  not ataxia  of observable movements  Station/Gait:  not tested    Stroke Scales    NIHSS  Interval (daily exam) (06/30/21 1136)   Interval Comments     1a. Level of Consciousness 0-->Alert, keenly responsive   1b. LOC Questions 0-->Answers both questions correctly   1c. LOC Commands 0-->Performs both tasks correctly   2.   Best Gaze 0-->Normal   3.   Visual 0-->No visual loss   4.   Facial Palsy 1-->Minor paralysis (flattened nasolabial fold, asymmetry on smiling)   5a. Motor Arm, Left 0-->No drift, limb holds 90 (or 45) degrees for full 10 secs   5b. Motor Arm, Right 0-->No drift, limb holds 90 (or 45) degrees for full 10 secs   6a. Motor Leg, Left 2-->Some effort against gravity, leg falls to bed by 5 secs, but has some effort against gravity   6b. Motor Leg, right 2-->Some effort against gravity, leg falls to bed by 5 secs, but has some effort against gravity   7.   Limb Ataxia 0-->Absent   8.   Sensory 0-->Normal, no sensory loss   9.   Best Language 0-->No aphasia, normal   10. Dysarthria 0-->Normal   11. Extinction and Inattention  0-->No abnormality   Total 5 (06/30/21 1136)       Imaging  I personally reviewed all imaging; relevant findings per HPI.     Lab Results Data   CBC  Recent Labs   Lab 06/30/21  0619 06/29/21  0741 06/28/21  0744   WBC 7.1 8.0 11.4*   RBC 3.01* 3.22* 3.54*   HGB 7.7* 8.2* 9.2*   HCT 25.9* 27.2* 29.4*    252 304     Basic Metabolic Panel    Recent Labs   Lab 06/30/21  0619 06/29/21  2308 06/29/21  0741 06/28/21  0855 06/28/21  0855   *  --  145*  --  145*   POTASSIUM 3.3* 3.3* 3.2*   < > 3.2*   CHLORIDE 118*  --  116*  --  117*   CO2 22  --  22  --  21   BUN 11  --  12  --  13   CR 1.28*  --  1.37*  --  1.36*   GLC 92  --  103*  --  118*   MANDI 7.8*  --  7.8*  --  7.7*    < > = values in this interval not displayed.     Liver Panel  Recent Labs   Lab 06/30/21  0619 06/29/21  0741 06/28/21  0855   PROTTOTAL  --   --  6.3*   ALBUMIN 1.5* 1.5* 1.6*   BILITOTAL  --   --  0.3   ALKPHOS   --   --  77   AST  --   --  16   ALT  --   --  13     INR    Recent Labs   Lab Test 06/30/21  0619 06/29/21  1507   INR 1.50* 1.30*      Lipid Profile    Recent Labs   Lab Test 06/27/21  0725   CHOL 65   HDL 18*   LDL 31   TRIG 80     A1C  No lab results found.  Troponin I  No results for input(s): TROPI in the last 168 hours.

## 2021-06-30 NOTE — PROVIDER NOTIFICATION
Paged by RN regarding + blood culture growing Gram + cocci in pairs and chains. Patient growing E. faecalis on OSH cultures per notes which is likely the bacteria here too. On Zosyn and per notes the OSH culture was sensitive to ampicillin. Continue current cares.    Anibal Thomas MD, MPH  Internal Medicine

## 2021-06-30 NOTE — PLAN OF CARE
Pt here with basilar artery thrombus, R joey CVA. A&Ox3 disoriented to time. Neuros R facial droop, generalized weakness, BLE weakness 2-3/5. Confusion/forgetful at times but easily redirectable. VSS. Tele A-fib CVR. DD2 diet, nectar thick liquids. Potassium recheck in the AM. Takes pills whole with thickened water. Up to bedside commode with rick steady. Denies pain. Pt scoring green on the Aggression Stop Light Tool. Discharge to TCU pending.

## 2021-06-30 NOTE — PROVIDER NOTIFICATION
"FYI text page to Dr. Parr. \"Call from microbiology lab. Positive blood culture drawn from R hand on 6/28/21 at 1220. Growing gram positive cocci in pairs and chains\".   "

## 2021-06-30 NOTE — PLAN OF CARE
A and O x3, off on exact day of month. Forgetful. BLE weakness, R weaker than L. BLE and LUE edema. Up with 2, belt, and pivot with walker or up wth Yvette Steady. Fair appetite on DD2 diet with nectar thick liquids. Taking pills whole. Healing sore on buttock, covered with mepilex dressing. Labile BP, low at times. Tele reading a. Fib with CVR. Family at bedside throughout day. Scoring green on aggression screening tool. Per ID, plan may include MARK ANTHONY. Incontinent of bladder x1, BM x2 in commode.

## 2021-06-30 NOTE — CONSULTS
Consult Date: 06/30/2021    INFECTIOUS DISEASE CONSULTATION    LOCATION:  Room 705, [facility]      REFERRING PHYSICIAN:  Jean Grubbs MD    IMPRESSION:    1.  An 89-year-old female admitted with new cerebrovascular infarction with basilar artery thrombosis seen on imaging.  2.  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  3.  Prior cerebrovascular infarction earlier this year. I see no evidence of blood cultures done at that time.  4.  History of recurrent urinary infections have included some systemic symptoms intermittently, never any true lower urinary symptoms and her cultures all grew mixed eleni.  5.  History of aortic valve replacement with complex aortic graft material.  6.  Chronic renal insufficiency.  7.  Atrial fibrillation.    RECOMMENDATIONS:     1.  IV ampicillin and synergistic ceftriaxone.  2.  Follow serial blood cultures until clearing.  3.  Microbiology already essentially defines prosthetic infection, almost certainly has aortic valve infection, now in retrospect have to consider possibility of multiple emboli related to this.  4.  Needs transesophageal echocardiogram despite normal transthoracic echocardiogram and possibly some further imaging also of the aortic graft material.  She will need an extended course of IV antibiotics, but hold on PICC line for now.    HISTORY OF PRESENT ILLNESS:  This 89-year-old female is seen in consultation due to high-grade enterococcus bacteremia.  The patient has a history earlier this year of having a stroke and has then been being diagnosed with recurrent urinary infections, treated with multiple different antibiotics this year with abnormal urinalysis, but no urinary tract symptoms.  Of note, had some intermittent fevers, weakness and not feeling  well throughout this time. As best I can tell throughout the healthy systems despite her having the known prosthetic material, has had no blood cultures done throughout this time.  There is no perception she had any significant fever, but at the time of presentation a [facility] currently had fever along with the new right facial droop and stroke.  Workup showed a basilar artery thrombosis and now blood cultures and healthy system rapidly positive in all bottles, all cultures for Enterococcus and now follow up culture despite Zosyn, to which the organism is sensitive, also growing enterococcus defining endovascular infection.    PAST MEDICAL HISTORY:  No major infections in the past, history of aortic valve and prior prosthetic material.  Exact timing and anatomy is unclear.  No prior infections related to this.  History of recent recurrent urinary infections although, of note, cultures all had mixed eleni, no predominant pathogen, possibly the enterococcus from there, but at this point defined as likely endovascular based on the micro.    SOCIAL AND FAMILY HISTORY:  No recent travel exposures.  [facility]    ALLERGIES:  NONE.    REVIEW OF SYSTEMS:  General weakness, malaise and the neurologic symptoms, no particular fever symptoms.    PHYSICAL EXAMINATION:    GENERAL:  The patient appears his stated age, does not look toxic or ill.  Cognition fairly intact.  HEENT:  She has a facial droop.  NECK:  Supple, nontender, no meningismus.  HEART:  Murmur same as described.  Slight irregularity.  LUNGS:  Crackles at both bases.  ABDOMEN:  Nontender.  EXTREMITIES:  No obvious embolic lesions.    LABORATORY DATA:  Multiple blood cultures from [facility] and now here on antibiotics are positive for Enterococcus, which is sensitive.    Thank you very much for the consultation.  I will follow the patient with you.    Daniel Stephens MD        D: 06/30/2021   T: 06/30/2021    MT: [id number]    Name:     GENEVIEVE MERCADO  MRN:      [id number]        Account:      [id number]   :      1932           Consult Date: 2021     Document: [id number]

## 2021-07-01 ENCOUNTER — APPOINTMENT (OUTPATIENT)
Dept: PHYSICAL THERAPY | Facility: CLINIC | Age: 86
DRG: 288 | End: 2021-07-01
Attending: HOSPITALIST
Payer: MEDICARE

## 2021-07-01 ENCOUNTER — APPOINTMENT (OUTPATIENT)
Dept: OCCUPATIONAL THERAPY | Facility: CLINIC | Age: 86
DRG: 288 | End: 2021-07-01
Attending: HOSPITALIST
Payer: MEDICARE

## 2021-07-01 ENCOUNTER — HOME INFUSION (PRE-WILLOW HOME INFUSION) (OUTPATIENT)
Dept: PHARMACY | Facility: CLINIC | Age: 86
End: 2021-07-01

## 2021-07-01 LAB
ALBUMIN SERPL-MCNC: 1.7 G/DL (ref 3.4–5)
ANION GAP SERPL CALCULATED.3IONS-SCNC: 7 MMOL/L (ref 3–14)
BACTERIA SPEC CULT: ABNORMAL
BASOPHILS # BLD AUTO: 0.1 10E9/L (ref 0–0.2)
BASOPHILS NFR BLD AUTO: 0.9 %
BUN SERPL-MCNC: 10 MG/DL (ref 7–30)
CALCIUM SERPL-MCNC: 8.1 MG/DL (ref 8.5–10.1)
CHLORIDE SERPL-SCNC: 114 MMOL/L (ref 94–109)
CO2 SERPL-SCNC: 23 MMOL/L (ref 20–32)
CREAT SERPL-MCNC: 1.25 MG/DL (ref 0.52–1.04)
DIFFERENTIAL METHOD BLD: ABNORMAL
EOSINOPHIL # BLD AUTO: 0.1 10E9/L (ref 0–0.7)
EOSINOPHIL NFR BLD AUTO: 1.6 %
ERYTHROCYTE [DISTWIDTH] IN BLOOD BY AUTOMATED COUNT: 16.8 % (ref 10–15)
GFR SERPL CREATININE-BSD FRML MDRD: 38 ML/MIN/{1.73_M2}
GLUCOSE SERPL-MCNC: 96 MG/DL (ref 70–99)
HCT VFR BLD AUTO: 28.2 % (ref 35–47)
HGB BLD-MCNC: 8.6 G/DL (ref 11.7–15.7)
IMM GRANULOCYTES # BLD: 0.1 10E9/L (ref 0–0.4)
IMM GRANULOCYTES NFR BLD: 0.6 %
INR PPP: 1.53 (ref 0.86–1.14)
LABORATORY COMMENT REPORT: NORMAL
LYMPHOCYTES # BLD AUTO: 1.8 10E9/L (ref 0.8–5.3)
LYMPHOCYTES NFR BLD AUTO: 20.1 %
MCH RBC QN AUTO: 25.7 PG (ref 26.5–33)
MCHC RBC AUTO-ENTMCNC: 30.5 G/DL (ref 31.5–36.5)
MCV RBC AUTO: 84 FL (ref 78–100)
MONOCYTES # BLD AUTO: 0.5 10E9/L (ref 0–1.3)
MONOCYTES NFR BLD AUTO: 5.6 %
NEUTROPHILS # BLD AUTO: 6.3 10E9/L (ref 1.6–8.3)
NEUTROPHILS NFR BLD AUTO: 71.2 %
NRBC # BLD AUTO: 0 10*3/UL
NRBC BLD AUTO-RTO: 0 /100
PHOSPHATE SERPL-MCNC: 2.7 MG/DL (ref 2.5–4.5)
PLATELET # BLD AUTO: 261 10E9/L (ref 150–450)
POTASSIUM SERPL-SCNC: 3.1 MMOL/L (ref 3.4–5.3)
POTASSIUM SERPL-SCNC: 3.2 MMOL/L (ref 3.4–5.3)
RBC # BLD AUTO: 3.35 10E12/L (ref 3.8–5.2)
SARS-COV-2 RNA RESP QL NAA+PROBE: NEGATIVE
SODIUM SERPL-SCNC: 144 MMOL/L (ref 133–144)
SPECIMEN SOURCE: ABNORMAL
SPECIMEN SOURCE: NORMAL
WBC # BLD AUTO: 8.8 10E9/L (ref 4–11)

## 2021-07-01 PROCEDURE — 36415 COLL VENOUS BLD VENIPUNCTURE: CPT | Performed by: INTERNAL MEDICINE

## 2021-07-01 PROCEDURE — 120N000001 HC R&B MED SURG/OB

## 2021-07-01 PROCEDURE — 97110 THERAPEUTIC EXERCISES: CPT | Mod: GP

## 2021-07-01 PROCEDURE — 250N000011 HC RX IP 250 OP 636: Performed by: STUDENT IN AN ORGANIZED HEALTH CARE EDUCATION/TRAINING PROGRAM

## 2021-07-01 PROCEDURE — 84132 ASSAY OF SERUM POTASSIUM: CPT | Performed by: HOSPITALIST

## 2021-07-01 PROCEDURE — 99233 SBSQ HOSP IP/OBS HIGH 50: CPT | Mod: GC | Performed by: PSYCHIATRY & NEUROLOGY

## 2021-07-01 PROCEDURE — 250N000011 HC RX IP 250 OP 636: Performed by: INTERNAL MEDICINE

## 2021-07-01 PROCEDURE — 80069 RENAL FUNCTION PANEL: CPT | Performed by: INTERNAL MEDICINE

## 2021-07-01 PROCEDURE — 250N000013 HC RX MED GY IP 250 OP 250 PS 637: Performed by: INTERNAL MEDICINE

## 2021-07-01 PROCEDURE — 87040 BLOOD CULTURE FOR BACTERIA: CPT | Performed by: INTERNAL MEDICINE

## 2021-07-01 PROCEDURE — 87635 SARS-COV-2 COVID-19 AMP PRB: CPT | Performed by: HOSPITALIST

## 2021-07-01 PROCEDURE — 97530 THERAPEUTIC ACTIVITIES: CPT | Mod: GP

## 2021-07-01 PROCEDURE — 250N000011 HC RX IP 250 OP 636: Performed by: HOSPITALIST

## 2021-07-01 PROCEDURE — 97530 THERAPEUTIC ACTIVITIES: CPT | Mod: GO | Performed by: OCCUPATIONAL THERAPIST

## 2021-07-01 PROCEDURE — 250N000013 HC RX MED GY IP 250 OP 250 PS 637: Performed by: PHYSICIAN ASSISTANT

## 2021-07-01 PROCEDURE — 85610 PROTHROMBIN TIME: CPT | Performed by: INTERNAL MEDICINE

## 2021-07-01 PROCEDURE — 99233 SBSQ HOSP IP/OBS HIGH 50: CPT | Performed by: HOSPITALIST

## 2021-07-01 PROCEDURE — 250N000013 HC RX MED GY IP 250 OP 250 PS 637: Performed by: HOSPITALIST

## 2021-07-01 PROCEDURE — 97110 THERAPEUTIC EXERCISES: CPT | Mod: GO | Performed by: OCCUPATIONAL THERAPIST

## 2021-07-01 PROCEDURE — 36415 COLL VENOUS BLD VENIPUNCTURE: CPT | Performed by: HOSPITALIST

## 2021-07-01 PROCEDURE — 85025 COMPLETE CBC W/AUTO DIFF WBC: CPT | Performed by: INTERNAL MEDICINE

## 2021-07-01 RX ORDER — HEPARIN SODIUM 10000 [USP'U]/100ML
0-5000 INJECTION, SOLUTION INTRAVENOUS CONTINUOUS
Status: DISCONTINUED | OUTPATIENT
Start: 2021-07-01 | End: 2021-07-01

## 2021-07-01 RX ORDER — POTASSIUM CHLORIDE 1.5 G/1.58G
20 POWDER, FOR SOLUTION ORAL ONCE
Status: DISCONTINUED | OUTPATIENT
Start: 2021-07-01 | End: 2021-07-02

## 2021-07-01 RX ORDER — ACETAMINOPHEN 500 MG
1000 TABLET ORAL EVERY 6 HOURS PRN
Status: DISCONTINUED | OUTPATIENT
Start: 2021-07-01 | End: 2021-07-08 | Stop reason: HOSPADM

## 2021-07-01 RX ORDER — POTASSIUM CHLORIDE 1500 MG/1
20 TABLET, EXTENDED RELEASE ORAL ONCE
Status: COMPLETED | OUTPATIENT
Start: 2021-07-01 | End: 2021-07-01

## 2021-07-01 RX ORDER — WARFARIN SODIUM 5 MG/1
5 TABLET ORAL
Status: COMPLETED | OUTPATIENT
Start: 2021-07-01 | End: 2021-07-01

## 2021-07-01 RX ORDER — POTASSIUM CHLORIDE 7.45 MG/ML
10 INJECTION INTRAVENOUS
Status: COMPLETED | OUTPATIENT
Start: 2021-07-01 | End: 2021-07-01

## 2021-07-01 RX ORDER — HEPARIN SODIUM 10000 [USP'U]/100ML
0-5000 INJECTION, SOLUTION INTRAVENOUS CONTINUOUS
Status: DISCONTINUED | OUTPATIENT
Start: 2021-07-02 | End: 2021-07-05

## 2021-07-01 RX ORDER — ACETAMINOPHEN 325 MG/1
TABLET ORAL
Status: DISCONTINUED
Start: 2021-07-01 | End: 2021-07-01 | Stop reason: WASHOUT

## 2021-07-01 RX ADMIN — PANTOPRAZOLE SODIUM 40 MG: 40 TABLET, DELAYED RELEASE ORAL at 16:49

## 2021-07-01 RX ADMIN — POTASSIUM CHLORIDE 10 MEQ: 7.46 INJECTION, SOLUTION INTRAVENOUS at 13:47

## 2021-07-01 RX ADMIN — POTASSIUM CHLORIDE 20 MEQ: 1500 TABLET, EXTENDED RELEASE ORAL at 22:30

## 2021-07-01 RX ADMIN — ENOXAPARIN SODIUM 80 MG: 80 INJECTION SUBCUTANEOUS at 03:20

## 2021-07-01 RX ADMIN — ACETAMINOPHEN 650 MG: 650 SUPPOSITORY RECTAL at 15:09

## 2021-07-01 RX ADMIN — ENOXAPARIN SODIUM 80 MG: 80 INJECTION SUBCUTANEOUS at 15:13

## 2021-07-01 RX ADMIN — AMPICILLIN SODIUM 2 G: 2 INJECTION, POWDER, FOR SOLUTION INTRAVENOUS at 23:32

## 2021-07-01 RX ADMIN — ATORVASTATIN CALCIUM 20 MG: 20 TABLET, FILM COATED ORAL at 21:06

## 2021-07-01 RX ADMIN — METOPROLOL TARTRATE 12.5 MG: 25 TABLET, FILM COATED ORAL at 21:06

## 2021-07-01 RX ADMIN — ACETAMINOPHEN 1000 MG: 500 TABLET, FILM COATED ORAL at 23:08

## 2021-07-01 RX ADMIN — METOPROLOL TARTRATE 12.5 MG: 25 TABLET, FILM COATED ORAL at 08:48

## 2021-07-01 RX ADMIN — AMPICILLIN SODIUM 2 G: 2 INJECTION, POWDER, FOR SOLUTION INTRAVENOUS at 12:40

## 2021-07-01 RX ADMIN — CYANOCOBALAMIN TAB 1000 MCG 1000 MCG: 1000 TAB at 08:48

## 2021-07-01 RX ADMIN — AMPICILLIN SODIUM 2 G: 2 INJECTION, POWDER, FOR SOLUTION INTRAVENOUS at 17:29

## 2021-07-01 RX ADMIN — PANTOPRAZOLE SODIUM 40 MG: 40 TABLET, DELAYED RELEASE ORAL at 08:48

## 2021-07-01 RX ADMIN — POTASSIUM CHLORIDE 10 MEQ: 7.46 INJECTION, SOLUTION INTRAVENOUS at 15:03

## 2021-07-01 RX ADMIN — AMPICILLIN SODIUM 2 G: 2 INJECTION, POWDER, FOR SOLUTION INTRAVENOUS at 06:09

## 2021-07-01 RX ADMIN — CEFTRIAXONE SODIUM 2 G: 2 INJECTION, POWDER, FOR SOLUTION INTRAMUSCULAR; INTRAVENOUS at 16:49

## 2021-07-01 RX ADMIN — GABAPENTIN 200 MG: 100 CAPSULE ORAL at 21:06

## 2021-07-01 RX ADMIN — FOLIC ACID 2000 MCG: 1 TABLET ORAL at 08:48

## 2021-07-01 RX ADMIN — WARFARIN SODIUM 5 MG: 5 TABLET ORAL at 17:28

## 2021-07-01 ASSESSMENT — ACTIVITIES OF DAILY LIVING (ADL)
ADLS_ACUITY_SCORE: 23

## 2021-07-01 NOTE — PROGRESS NOTES
Care Management Follow Up    Length of Stay (days): 5    Expected Discharge Date: 07/02/21(TCU)     Concerns to be Addressed: discharge planning  [facility]  Patient plan of care discussed at interdisciplinary rounds: Yes    Anticipated Discharge Disposition: Transitional Care     Anticipated Discharge Services:    Anticipated Discharge DME:      Patient/family educated on Medicare website which has current facility and service quality ratings: yes  Education Provided on the Discharge Plan:    Patient/Family in Agreement with the Plan: yes    Referrals Placed by CM/SW:    Private pay costs discussed: Not applicable    Additional Information:    Sw reviewed chart to determine TCU referral status:    [facility]:  Pending, left vm  [facility]:  Pending, left vm  [facility]:  Resent referral for review, earliest admission date of 7/5/21 is available (initially declined due to full capacity)    Sw spoke with son, Guilherme, and updated him on the above info.  Guilherme was interested in knowing out of pocket costs for IV abx and if Medicare covers IV abx in TCU without having a skilled need.  Sw and CC were unsure and suggested to son that once pt is accepted into TCU facility, these questions can be asked as they will look into pt's benefits and coverage.  Son Guilherme has appt to complete MA abdirahman for pt with the intention of transitioning her into LTC following TCU.  Sw explained that MA will retroactive pay for medical costs which may resolve his concern around IV abx coverage.  Per son, [facility] is also willing to consider pt for LTC following TCU.  Al will continue to update son on referral status.    Update:  [facility] has declined, stating pt needs memory care.  Pending referrals are:  [facility] and [facility] include [facility] beds [facility]          [facility]

## 2021-07-01 NOTE — PLAN OF CARE
Speech-Language Pathology      Attempted to see for dysphagia therapy, however pt NPO for possible MARK ANTHONY per RN. Will re-attempt as able/appropriate.

## 2021-07-01 NOTE — PROGRESS NOTES
Therapy: iv abx  Insurance: Medicare     Patient does not have IV abx coverage in the home with their Medicare plan. Drug would be billed to the part D and supplies will be self pay. Based on ceftriaxone 2g q24h and ampicillin 2g q6h total cost is $53.43/day for drug and supplies.    Nursing is only covered if patient is homebound if not cost is $90.00 per visit if [facility] bills for it. Patient should have coverage in a TCU or infusion center.     [facility] referral for admission date 6/26/21 to check for iv abx coverage.    Please contact Intake with any questions, [phone] or In Basket pool,  Home Infusion ([phone]).

## 2021-07-01 NOTE — PROGRESS NOTES
[facility]  Infectious Disease Progress Note          Assessment and Plan:   IMPRESSION:    1.  An 89-year-old female admitted with new cerebrovascular infarction with basilar artery thrombosis seen on imaging.  2.  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  3.  Prior cerebrovascular infarction earlier this year. I see no evidence of blood cultures done at that time.  4.  History of recurrent urinary infections have included some systemic symptoms intermittently, never any true lower urinary symptoms and her cultures all grew mixed eleni.  5.  History of aortic valve replacement with complex aortic graft material.  6.  Chronic renal insufficiency.  7.  Atrial fibrillation.     RECOMMENDATIONS:     1.  IV ampicillin and synergistic ceftriaxone.  2.  Follow serial blood cultures until clearing. So far 6/30 neg, both 6/28 BC now +  3.  Microbiology already essentially defines endovacular infection, almost certainly has aortic valve infection, now in retrospect have to consider possibility of multiple emboli related to this. I see no BC in [facility]  4.  Needs transesophageal echocardiogram,  despite normal transthoracic echocardiogram to define anatomy, RO abscess , Also  possibly some further imaging also of the aortic graft material.    5 She will need an extended course of IV antibiotics, but hold on PICC line until BC neg.           Interval History:   no new complaints and doing well; no cp, sob, n/v/d, or abd pain. Feels well, no new neuro sxs              Medications:       ampicillin  2 g Intravenous Q6H     atorvastatin  20 mg Oral QPM     cefTRIAXone  2 g Intravenous Q24H     cyanocobalamin  1,000 mcg Oral Daily     enoxaparin ANTICOAGULANT  1 mg/kg Subcutaneous Q12H      "folic acid  2,000 mcg Oral Daily     gabapentin  200 mg Oral At Bedtime     gadobutrol  8 mL Intravenous Once     metoprolol tartrate  12.5 mg Oral BID     pantoprazole  40 mg Oral BID AC     sodium chloride 0.9 %  100 mL As instructed Once     sodium chloride (PF)  3 mL Intracatheter Q8H                  Physical Exam:   Blood pressure 133/69, pulse 84, temperature 98.3  F (36.8  C), temperature source Oral, resp. rate 16, height 1.549 m (5' 1\"), weight 80.1 kg (176 lb 9.4 oz), SpO2 94 %.  Wt Readings from Last 2 Encounters:   06/28/21 80.1 kg (176 lb 9.4 oz)     Vital Signs with Ranges  Temp:  [98.1  F (36.7  C)-98.5  F (36.9  C)] 98.3  F (36.8  C)  Pulse:  [73-86] 84  Resp:  [16] 16  BP: (104-135)/(44-69) 133/69  SpO2:  [93 %-95 %] 94 %    Constitutional: Awake, alert, cooperative, no apparent distress   Lungs: Clear to auscultation bilaterally, no crackles or wheezing   Cardiovascular: Regular rate and rhythm, normal S1 and S2, and no murmur noted   Abdomen: Normal bowel sounds, soft, non-distended, non-tender   Skin: No rashes, no cyanosis, no edema   Other:           Data:   All microbiology laboratory data reviewed.  Recent Labs   Lab Test 07/01/21  0712 06/30/21  0619 06/29/21  0741   WBC 8.8 7.1 8.0   HGB 8.6* 7.7* 8.2*   HCT 28.2* 25.9* 27.2*   MCV 84 86 85    221 252     Recent Labs   Lab Test 07/01/21  0712 06/30/21  0619 06/29/21  0741   CR 1.25* 1.28* 1.37*     No lab results found.  Recent Labs   Lab Test 06/30/21  1316 06/28/21  1220   CULT No growth after 16 hours  No growth after 16 hours Cultured on the 1st day of incubation:  Enterococcus faecalis  *  Critical Value/Significant Value, preliminary result only, called to and read back by  ANTHONY BERRY RN @8570 6.29.21 LM    (Note)  POSITIVE for ENTEROCOCCUS FAECALIS and NEGATIVE for Star/vanB genes  by Verigene multiplex nucleic acid test. Final identification and  antimicrobial susceptibility testing will be verified by " standard  methods.    Specimen tested with [employer] multiplex, gram-positive blood culture  nucleic acid test for the following targets: Staph aureus, Staph  epidermidis, Staph lugdunensis, other Staph species, Enterococcus  faecalis, Enterococcus faecium, Streptococcus species, S. agalactiae,  S. anginosus grp., S. pneumoniae, S. pyogenes, Listeria sp., mecA  (methicillin resistance) and Star/B (vancomycin resistance).    Critical Value/Significant Value called to and read back by  Veena Vogel RN 6/30/21 @ 0045 TF    Cultured on the 2nd day of incubation:  Gram positive cocci in pairs and chains  *  Critical Value/Significant Value, preliminary result only, called to and read back by  LUIS M MENJIVAR, RN 2128 6.30.21 NDP

## 2021-07-01 NOTE — PROGRESS NOTES
"   [facility]    Stroke Progress Note    Interval Events  Re-consulted today regarding concern from Infectious disease and possible endocarditis. 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  They are considering this possibility currently.  Pt reports she had a HA earlier which resolved with medication     Impression  1. 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 Size and location of infarct (pontomedullary) is more likely to be secondary to be secondary to Afib rather than endocarditis; however, due to high likelihood of active endocarditis risk of bleeding on anticoagulation is high. Plan to repeat MRI to look for cerebrovascular evidence of continued infarctions as would be expected in active endocarditis. Also repeat CTA head and neck to evaluate partial thrombus in setting of post 6 days of anticoagulation therapy.     > Due to concurrent risk factors for stroke--intraluminal thrombus in the basilar artery and history of afib--and bleeding risk--infective endocarditis--careful consideration of risks and benefits need to be considered regarding anticoagulation therapy. Due to known thrombus, would continue anticoagulation therapy with heparin gtt with low threshold to rescan patient with any changes in exam or mental status.   > Furthermore, agree with ongoing discussion with family     Stroke Evaluation summarized:  MRI/Head CT: R pontomedullary junction infarct  Intracranial Vascular Imaging: partially occlusive thrombus of " "the proximal basilar artery  Cervical Carotid and Vertebral Artery Vascular Imaging: no stenoses  Echocardiogram: LVEF 55-60%, severe LA enlargement, moderate tricuspid regurg  EKG/Telemetry: a fib  LDL: 31  A1c: 5.8  Troponin: not obtained  Other testing: Not Applicable    Plan  - Repeat MRI brain  - Repeat CTA Head and Neck  - Transesophageal echocardiogram (MARK ANTHONY)   - Defer antibiotic therapy to Infectious disease and primary team  - Continue q4 hour neurochecks while inpatient  - Discontinue therapeutic lovenox   - Discontinue Warfarin  - Start therapeutic heparin gtt  - Given active, partially occlusive thrombus on CTA, would recommend continued anticoagulation therapy despite current concern for endocarditis  - Low threshold for CT Head with any changes to exam  - Goal normotension, <130/80 or lower if tolerated for overall reduced cardiovascular risk. Please titrate enteral meds to achieve. IV PRN BP meds only for SBP >180.  - Continue decreased dose atorvastatin 20 mg daily in light of LDL 31 and increased risk of ICH in LDL<40  - Follow up with Stroke Neurology with repeat CTA (ordered for you) in 1-2 months    Vascular Neurology will continue to follow. Please do not hesitate to contact us with questions or concerns, should they arise.    The Stroke Staff is Dr. Meade.    Guilherme Croft MD [facility]  Vascular Neurology Fellow  To page me or covering stroke neurology team member, click here: AMCOM   Choose \"On Call\" tab at top, then search dropdown box for \"Neurology Adult\", select location, press Enter, then look for stroke/neuro ICU/telestroke.    ______________________________________________________    Medications   Home Meds  Prior to Admission medications    Medication Sig Start Date End Date Taking? Authorizing Provider   acetaminophen (TYLENOL) 325 MG tablet Take 650 mg by mouth every 8 hours as needed for mild pain   Yes Unknown, Entered By History   apixaban ANTICOAGULANT (ELIQUIS) 5 MG tablet Take 5 " mg by mouth 2 times daily   Yes Unknown, Entered By History   atorvastatin (LIPITOR) 40 MG tablet Take 40 mg by mouth At Bedtime   Yes Unknown, Entered By History   cyanocobalamin (VITAMIN B-12) 1000 MCG tablet Take 1,000 mcg by mouth daily   Yes Unknown, Entered By History   ferrous sulfate (FEROSUL) 325 (65 Fe) MG tablet Take 325 mg by mouth three times a week Mon - Wed - Fri. Takes with orange juice.   Yes Unknown, Entered By History   folic acid (FOLVITE) 400 MCG tablet Take 2,000 mcg by mouth daily 400 mcg x 5 tablets for 1 month then on 7/18/21 decrease to 400 mcg daily   Yes Unknown, Entered By History   gabapentin (NEURONTIN) 300 MG capsule Take 300 mg by mouth At Bedtime   Yes Unknown, Entered By History   latanoprost (XALATAN) 0.005 % ophthalmic solution Place 1 drop into both eyes At Bedtime   Yes Unknown, Entered By History   metoprolol tartrate (LOPRESSOR) 25 MG tablet Take 12.5 mg by mouth 2 times daily   Yes Unknown, Entered By History   mirtazapine (REMERON SOL-TAB) 15 MG ODT 15 mg by Orally disintegrating tablet route At Bedtime   Yes Unknown, Entered By History   omeprazole (PRILOSEC) 20 MG DR capsule Take 20 mg by mouth daily   Yes Unknown, Entered By History   ondansetron (ZOFRAN-ODT) 4 MG ODT tab Take 4 mg by mouth every morning   Yes Unknown, Entered By History   ondansetron (ZOFRAN-ODT) 4 MG ODT tab Take 4 mg by mouth 2 times daily as needed for nausea   Yes Unknown, Entered By History       Scheduled Meds    ampicillin  2 g Intravenous Q6H     atorvastatin  20 mg Oral QPM     cefTRIAXone  2 g Intravenous Q24H     cyanocobalamin  1,000 mcg Oral Daily     enoxaparin ANTICOAGULANT  1 mg/kg Subcutaneous Q12H     folic acid  2,000 mcg Oral Daily     gabapentin  200 mg Oral At Bedtime     gadobutrol  8 mL Intravenous Once     metoprolol tartrate  12.5 mg Oral BID     pantoprazole  40 mg Oral BID AC     potassium chloride  20 mEq Oral or Feeding Tube Once     potassium chloride  10 mEq Intravenous  Q1H     sodium chloride 0.9 %  100 mL As instructed Once     sodium chloride (PF)  3 mL Intracatheter Q8H     warfarin ANTICOAGULANT  5 mg Oral ONCE at 18:00       Infusion Meds    - MEDICATION INSTRUCTIONS -       Warfarin Therapy Reminder         PRN Meds  acetaminophen, lidocaine 4%, lidocaine (buffered or not buffered), melatonin, ondansetron **OR** ondansetron, - MEDICATION INSTRUCTIONS -, sodium chloride (PF), Warfarin Therapy Reminder       PHYSICAL EXAMINATION  Temp:  [98.1  F (36.7  C)-98.5  F (36.9  C)] 98.3  F (36.8  C)  Pulse:  [73-86] 78  Resp:  [16] 16  BP: (117-149)/(61-79) 149/79  SpO2:  [93 %-95 %] 95 %     General:  patient lying in bed without any acute distress    HEENT:  normocephalic/atraumatic, edentulous  Cardio:  -  Pulmonary:  no respiratory distress  Abdomen:  soft, non-tender, non-distended  Extremities:  no edema, peripheral pulses palpable  Skin:  intact, warm/dry      Neurologic  Mental Status:  Eyes open spontaneously, alert and attentive, oriented to self, time, place and circumstnce and hospital, but not time or circumstance. Spontaneous language is fluent and coherent with intact comprehension of simple commands, naming, and repetition.  Cranial Nerves:  VFF, pupils 2 mm, round, dysconjugate gaze at rest, L eye exotropia, nystagmus on right lateral gaze - continuous, right facial droop, mild dysarthria  Motor:  normal muscle tone and bulk, no abnormal movements, able to move all limbs spontaneously, no drift BUEs, minimal antigravity strength BLEs,   Sensory:  intact to LT in 4 ext  Coordination:  not ataxia of observable movements  Station/Gait:  not tested    Stroke Scales    NIHSS  Interval (daily exam) (06/30/21 1136)   Interval Comments     1a. Level of Consciousness 0-->Alert, keenly responsive   1b. LOC Questions 0-->Answers both questions correctly   1c. LOC Commands 0-->Performs both tasks correctly   2.   Best Gaze 0-->Normal   3.   Visual 0-->No visual loss   4.   Facial  Palsy 1-->Minor paralysis (flattened nasolabial fold, asymmetry on smiling)   5a. Motor Arm, Left 0-->No drift, limb holds 90 (or 45) degrees for full 10 secs   5b. Motor Arm, Right 0-->No drift, limb holds 90 (or 45) degrees for full 10 secs   6a. Motor Leg, Left 2-->Some effort against gravity, leg falls to bed by 5 secs, but has some effort against gravity   6b. Motor Leg, right 2-->Some effort against gravity, leg falls to bed by 5 secs, but has some effort against gravity   7.   Limb Ataxia 0-->Absent   8.   Sensory 0-->Normal, no sensory loss   9.   Best Language 0-->No aphasia, normal   10. Dysarthria 0-->Normal   11. Extinction and Inattention  0-->No abnormality   Total 5 (06/30/21 1136)       Imaging  I personally reviewed all imaging; relevant findings per HPI.     Lab Results Data   CBC  Recent Labs   Lab 07/01/21  0712 06/30/21  0619 06/29/21  0741   WBC 8.8 7.1 8.0   RBC 3.35* 3.01* 3.22*   HGB 8.6* 7.7* 8.2*   HCT 28.2* 25.9* 27.2*    221 252     Basic Metabolic Panel    Recent Labs   Lab 07/01/21  0712 06/30/21  1317 06/30/21  0619 06/29/21  0741 06/29/21  0741     --  146*  --  145*   POTASSIUM 3.1* 3.8 3.3*   < > 3.2*   CHLORIDE 114*  --  118*  --  116*   CO2 23  --  22  --  22   BUN 10  --  11  --  12   CR 1.25*  --  1.28*  --  1.37*   GLC 96  --  92  --  103*   MANDI 8.1*  --  7.8*  --  7.8*    < > = values in this interval not displayed.     Liver Panel  Recent Labs   Lab 07/01/21  0712 06/30/21  0619 06/29/21  0741 06/28/21  0855   PROTTOTAL  --   --   --  6.3*   ALBUMIN 1.7* 1.5* 1.5* 1.6*   BILITOTAL  --   --   --  0.3   ALKPHOS  --   --   --  77   AST  --   --   --  16   ALT  --   --   --  13     INR    Recent Labs   Lab Test 07/01/21  0712 06/30/21  0619 06/29/21  1507   INR 1.53* 1.50* 1.30*      Lipid Profile    Recent Labs   Lab Test 06/27/21  0725   CHOL 65   HDL 18*   LDL 31   TRIG 80     A1C  No lab results found.  Troponin I  No results for input(s): RAUL in the last 168  hours.

## 2021-07-01 NOTE — CONSULTS
[facility] - Dig[facility]ive [facility] Consultation     Ekaterina Thayer  [address] [place] [address]  89 year old female     Admission Date/Time: 6/26/2021  Primary Care Provider: No Ref-Primary, Physician  Referring / Attending Physician:  Robbie     We were asked to see the patient in consultation by Dr. Avalos for evaluation of risk of GIB.    ASSESSMENT/RECS:    This patient has a normocytic anemia without evidence of ongoing GI bleeding.  She remains at risk for perhaps microscopic bleeding, but with anticoagulation may be at a higher risk for overt GI bleeding due to known angiectasia's of the small bowel.   I would not consider this a contraindication to anticoagulation, however given her cardiac/infectious issues.  Certainly further thromboembolic phenomenon would be of greater consequence than GI bleeding and therefore I would favor proceeding with anticoagulation which I discussed with Dr. Avalos.    There is no effective endoscopic therapy for patients to have multiple small bowel angiectasia's, which I suspect this patient has.  If acute hematochezia should occur the next step would be colonoscopy but I would not perform this prophylactically nor would I recommend repeat endoscopy at this time.    Continue diet, follow hemoglobin and stool output.  We would be happy to see the patient again if she develops hematochezia or melena.    Thank you for involving us in this patient's care.  Please call me with any questions.    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    Currently, the patient is resting comfortably.  She has no complaints.  She denies diarrhea, abdominal pain, intolerance to oral intake or blood in the stool.    Hemoglobin 8.6, MCV 84.  Admit hemoglobin 8.0.  Hemoglobin prior to admission on 6/26/2021 was 7.6, MCV 83    EGD performed at [facility] 6/16/21 for anemia and suspected upper GI blood loss, which demonstrated no changes of ulcer, gastritis etc.  A single diminutive angiectasia with bleeding was found in the second portion of the duodenum, treated with hemostatic clips.     ROS: A comprehensive ten point review of systems was negative aside from those in mentioned in the HPI.      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 06/26/2021     Priority: Medium     S/P AVR & Ascending Aortic Aneur repair  06/26/2021     Priority: Medium     Stroke (H) 06/26/2021     Priority: Medium     SOCIAL HISTORY:  Social History     Tobacco Use     Smoking status: Never Smoker   Substance Use Topics     Alcohol use: Not Currently     Drug use: Not on file     FAMILY HISTORY:  Family History   Problem Relation Age of Onset     Cerebrovascular Disease Mother      Other - See Comments Father         MVA, then suicide     ALLERGIES: No Known Allergies  MEDICATIONS:   Current Facility-Administered Medications   Medication     acetaminophen (TYLENOL) Suppository 650 mg     ampicillin (OMNIPEN) 2 g vial to attach to  mL bag     atorvastatin (LIPITOR) tablet 20 mg     cefTRIAXone (ROCEPHIN) 2 g vial to attach to  ml bag for ADULTS or NS 50 ml bag  "for PEDS     cyanocobalamin (VITAMIN B-12) tablet 1,000 mcg     enoxaparin ANTICOAGULANT (LOVENOX) injection 80 mg     folic acid (FOLVITE) tablet 2,000 mcg     gabapentin (NEURONTIN) capsule 200 mg     gadobutrol (GADAVIST) injection 8 mL     lidocaine (LMX4) cream     lidocaine 1 % 0.1-1 mL     melatonin tablet 1 mg     metoprolol tartrate (LOPRESSOR) half-tab 12.5 mg     ondansetron (ZOFRAN-ODT) ODT tab 4 mg    Or     ondansetron (ZOFRAN) injection 4 mg     pantoprazole (PROTONIX) EC tablet 40 mg     Patient is already receiving anticoagulation with heparin, enoxaparin (LOVENOX), warfarin (COUMADIN)  or other anticoagulant medication     potassium chloride (KLOR-CON) Packet 20 mEq     potassium chloride 10 mEq in 100 mL sterile water intermittent infusion (premix)     Saline     sodium chloride (PF) 0.9% PF flush 3 mL     sodium chloride (PF) 0.9% PF flush 3 mL     warfarin ANTICOAGULANT (COUMADIN) tablet 5 mg     Warfarin Therapy Reminder (Check START DATE - warfarin may be starting in the FUTURE)     PHYSICAL EXAM:   BP (!) 149/79 (BP Location: Right arm)   Pulse 78   Temp 98.3  F (36.8  C) (Oral)   Resp 16   Ht 1.549 m (5' 1\")   Wt 80.1 kg (176 lb 9.4 oz)   SpO2 95%   BMI 33.37 kg/m     GEN: Alert, oriented x3, communicative and in NAD.  ARNALDO: AT, anicteric, OP without erythema, exudate, or ulcers.    NECK: Supple.    LYMPH: No LAD noted.  HRT: Reg  LUNGS: CTA  ABD: ND, +BS, no guarding or pain to palpation, no rebound, no HSM.       ADDITIONAL DATA:   I reviewed the patient's new clinical lab test results.   Recent Labs   Lab Test 07/01/21  0712 06/30/21  0619 06/29/21  1507 06/29/21  0741   WBC 8.8 7.1  --  8.0   HGB 8.6* 7.7*  --  8.2*   MCV 84 86  --  85    221  --  252   INR 1.53* 1.50* 1.30*  --      Recent Labs   Lab Test 07/01/21  0712 06/30/21  1317 06/30/21  0619 06/29/21  0741 06/29/21  0741   POTASSIUM 3.1* 3.8 3.3*   < > 3.2*   CHLORIDE 114*  --  118*  --  116*   CO2 23  --  22  --  " 22   BUN 10  --  11  --  12   ANIONGAP 7  --  6  --  7    < > = values in this interval not displayed.     Recent Labs   Lab Test 07/01/21  0712 06/30/21  0619 06/29/21  0741 06/28/21  0855   ALBUMIN 1.7* 1.5* 1.5* 1.6*   BILITOTAL  --   --   --  0.3   ALT  --   --   --  13   AST  --   --   --  16        I reviewed the patient's new imaging results.

## 2021-07-01 NOTE — PROGRESS NOTES
[facility]  Hospitalist Progress Note        Rodger Avalos, DO  07/01/2021        Interval History:      Discussed at length with two sons at bedside, explained risk and benefit of pursuing MARK ANTHONY, verbalized understanding, they want to discuss with sister and think about it, seem to be leaning more towards not doing MARK ANTHONY and comfort approach in that regard.          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 No invasive intervention recommended by the Neurology Repeat CT head shows Possible subacute ischemic infarct in the right frontal and left occipital lobe, CTA head shows nonocclusive thrombus at the proxima basilar artery remain stable. MRI of the brain shows acute/early subacute infarct at the right joey/medullary junction No other significant past medical history or family history. No hemorrhage.   - Neurology.   - MARK ANTHONY discussed with patient family and cardiology. Family considering risk/benefit.   - Neurologic monitoring.   - statin.   - Therapies following.   - Heparin gtt.   - Hold warfarin.   - Neurology managing anticoagulation.   - Discussed at length with two  sons at bedside, explained risk and benefit of pursuing MARK ANTHONY, verbalized understanding, they want to discuss with sister and think about it, seem to be leaning more towards not doing MARK ANTHONY and comfort approach in that regard.     Permanent atrial fib She is on Metoprolol,   - oral Metoprolol   - Neurology managing anticoagulation.      Possible Community acquired Pneumonia vs Aspiration Pneumonia.   High grade Enterococcus fecalis bacteremia   She reportedly had fever of 101.5 at outside hospital, her Xray chest on admission shows patchy bibasilar opacities suspicious for Pneumonic infiltrate.   - Ampicillin. Ceftriaxone. Defer to ID.   - ID following.   - Repeat blood culture on 6/29 positive, ID following.   - MARK ANTHONY ordered, however, cardiology requesting GI consult.   - GI consult at Cards request.     Acute Renal Failure. Patient has recently elevated creatinine in the range of 1.2-1.4, but in may of 2021 and march of 2021 she has normal creatinine. Most likely develop ARF during her admission with GI bleeding and likely in ATN at this time. She received IV contrast twice for CTA head and neck and perfusion,.   - Avoid NSAID's and nephrotoxic medications.      Chronic Anemia, H/O Recent Upper GI bleeding secondary to Angio ectasia Patient appears to have chronic anemia with recent acute on chronic anemia secondary to GI bleeding because of   duodenal angioectasia s/p clip placement.   - Protonix      History of stroke prior old Right frontal, and recently left Cerebellar on 4/8/21, and unable to walk related to left leg weakness Wheel chair bound.   - Monitor.      Benign essential hypertensionOn Metoprolol 12.5 mg bid at home.   - oral metoprolol bid.     S/P AVR & Ascending Aortic Aneurysm repair Stable.  - Monitor.      Bilateral Lower Extremity edema and Weakness. She has 1+ LE edema, most likely chronic, per report. US Venous Bilateral LE negative for DVT  - Monitor.      Hypokalemia, Hypernatremia.  "Hypernatremia likely because of NS, per report.   - Replace potassium as per protocol.  - Monitor and correct.      DVT Prophylaxis: Heparin gtt. Defer to Neuro.     Code Status: No CPR- Do NOT Intubate    Disposition: Pending MARK ANTHONY decision, consider palliative/hospice, pending Neurology recommendations for anticoagulation. TCU vs LTC per  note.                    Physical Exam:           Blood pressure 117/63, pulse 73, temperature 98.2  F (36.8  C), temperature source Oral, resp. rate 16, height 1.549 m (5' 1\"), weight 80.1 kg (176 lb 9.4 oz), SpO2 95 %.    Vitals:    21 0700 21 0400 21 1704   Weight: 81.6 kg (179 lb 14.3 oz) 83.6 kg (184 lb 4.9 oz) 80.1 kg (176 lb 9.4 oz)       Vital Sign Ranges  Temperature Temp  Av.3  F (36.8  C)  Min: 98.1  F (36.7  C)  Max: 98.5  F (36.9  C)   Blood pressure Systolic (24hrs), Av , Min:104 , Max:135        Diastolic (24hrs), Av, Min:44, Max:69      Pulse Pulse  Av.3  Min: 73  Max: 86   Respirations Resp  Av  Min: 16  Max: 16   Pulse oximetry SpO2  Av %  Min: 93 %  Max: 95 %     Vital Signs with Ranges  Temp:  [98.1  F (36.7  C)-98.5  F (36.9  C)] 98.2  F (36.8  C)  Pulse:  [73-86] 73  Resp:  [16] 16  BP: (104-135)/(44-69) 117/63  SpO2:  [93 %-95 %] 95 %    I/O Last 3 Shifts:   I/O last 3 completed shifts:  In: 940 [P.O.:840; I.V.:100]  Out: -     I/O past 24 hours:     Intake/Output Summary (Last 24 hours) at 2021 0726  Last data filed at 2021 1438  Gross per 24 hour   Intake 940 ml   Output --   Net 940 ml     GENERAL: Alert and confused. NAD. Conversational.   HEENT: Normocephalic. EOMI. No icterus or injection. Nares normal.   LUNGS: Clear to auscultation. No dyspnea at rest.   HEART: Regular rate. Extremities perfused.   ABDOMEN: Soft, nontender, and nondistended. Positive bowel sounds.   EXTREMITIES: LE edema noted.   NEUROLOGIC: Moves extremities x4, follows commands. right facial droop,  Moving both upper " extremities, weakness of both lower extremities.          Prior to Admission Medications:        Medications Prior to Admission   Medication Sig Dispense Refill Last Dose     acetaminophen (TYLENOL) 325 MG tablet Take 650 mg by mouth every 8 hours as needed for mild pain   Past Month at seldom     apixaban ANTICOAGULANT (ELIQUIS) 5 MG tablet Take 5 mg by mouth 2 times daily   6/25/2021 at pm     atorvastatin (LIPITOR) 40 MG tablet Take 40 mg by mouth At Bedtime   6/25/2021 at pm     cyanocobalamin (VITAMIN B-12) 1000 MCG tablet Take 1,000 mcg by mouth daily   6/25/2021 at am     ferrous sulfate (FEROSUL) 325 (65 Fe) MG tablet Take 325 mg by mouth three times a week Mon - Wed - Fri. Takes with orange juice.   6/25/2021     folic acid (FOLVITE) 400 MCG tablet Take 2,000 mcg by mouth daily 400 mcg x 5 tablets for 1 month then on 7/18/21 decrease to 400 mcg daily   6/25/2021 at am     gabapentin (NEURONTIN) 300 MG capsule Take 300 mg by mouth At Bedtime   6/25/2021 at pm     latanoprost (XALATAN) 0.005 % ophthalmic solution Place 1 drop into both eyes At Bedtime   6/25/2021 at pm     metoprolol tartrate (LOPRESSOR) 25 MG tablet Take 12.5 mg by mouth 2 times daily   6/25/2021 at pm     mirtazapine (REMERON SOL-TAB) 15 MG ODT 15 mg by Orally disintegrating tablet route At Bedtime   6/25/2021 at pm     omeprazole (PRILOSEC) 20 MG DR capsule Take 20 mg by mouth daily   6/25/2021 at am     ondansetron (ZOFRAN-ODT) 4 MG ODT tab Take 4 mg by mouth every morning   6/25/2021 at am     ondansetron (ZOFRAN-ODT) 4 MG ODT tab Take 4 mg by mouth 2 times daily as needed for nausea   Not Needed            Medications:        Current Facility-Administered Medications   Medication Last Rate     - MEDICATION INSTRUCTIONS -       Warfarin Therapy Reminder       Current Facility-Administered Medications   Medication Dose Route Frequency     ampicillin  2 g Intravenous Q6H     atorvastatin  20 mg Oral QPM     cefTRIAXone  2 g Intravenous  Q24H     cyanocobalamin  1,000 mcg Oral Daily     enoxaparin ANTICOAGULANT  1 mg/kg Subcutaneous Q12H     folic acid  2,000 mcg Oral Daily     gabapentin  200 mg Oral At Bedtime     gadobutrol  8 mL Intravenous Once     metoprolol tartrate  12.5 mg Oral BID     pantoprazole  40 mg Oral BID AC     sodium chloride 0.9 %  100 mL As instructed Once     sodium chloride (PF)  3 mL Intracatheter Q8H     Current Facility-Administered Medications   Medication Dose Route Frequency     acetaminophen  650 mg Rectal Q4H PRN     lidocaine 4%   Topical Q1H PRN     lidocaine (buffered or not buffered)  0.1-1 mL Other Q1H PRN     melatonin  1 mg Oral At Bedtime PRN     ondansetron  4 mg Oral Q6H PRN    Or     ondansetron  4 mg Intravenous Q6H PRN     - MEDICATION INSTRUCTIONS -   Does not apply Continuous PRN     sodium chloride (PF)  3 mL Intracatheter q1 min prn     Warfarin Therapy Reminder  1 each Does not apply Continuous PRN            Data:      Lab data reviewed.   Recent Labs   Lab 06/30/21  1317 06/30/21  0619 06/29/21  2308 06/29/21  1507 06/29/21  0741 06/28/21  0855 06/28/21  0855 06/28/21  0744   HGB  --  7.7*  --   --  8.2*  --   --  9.2*   MCV  --  86  --   --  85  --   --  83   PLT  --  221  --   --  252  --   --  304   INR  --  1.50*  --  1.30*  --   --   --   --    NA  --  146*  --   --  145*  --  145* 144   POTASSIUM 3.8 3.3* 3.3*  --  3.2*   < > 3.2* 3.3*   CHLORIDE  --  118*  --   --  116*  --  117* 115*   CO2  --  22  --   --  22  --  21 22   BUN  --  11  --   --  12  --  13 14   CR  --  1.28*  --   --  1.37*  --  1.36* 1.42*   ANIONGAP  --  6  --   --  7  --  7 7   MANDI  --  7.8*  --   --  7.8*  --  7.7* 7.8*   GLC  --  92  --   --  103*  --  118* 106*    < > = values in this interval not displayed.           Imaging:      Imaging data reviewed.     Dr. Rodger Avalos D.O.  [facility] Hospitalist  Pager [phone]

## 2021-07-01 NOTE — PLAN OF CARE
A and O x3, off on date. Forgetful. BLE weakness, R weaker than L. Up with 2, belt, evelyn steady. Voiding and BM in commode. Occult blood needs to be collected. NPO earlier in day for possible MARK ANTHONY, ate 50% of dinner. On DD2 with nectar thick liquids, requires tray set up only. LS  diminished at bilateral bases. VSS,  on RA. Tele reading a fib CVR/RVR. Scoring green on aggression screening tool. Pt following along with discussions with MD and RN.and discussing options with family. Family and pt do not want to proceed with MARK ANTHONY until family speaks with GI MD and with Dr. Stephens about how any findings will change the plan of care.

## 2021-07-01 NOTE — PLAN OF CARE
Pt here with basilar artery thrombus, R joey CVA. A&Ox3 disoriented to time. Neuros R facial droop, generalized weakness, BLE weakness 2-3/5. Confusion/forgetful at times but easily redirectable. VSS. Tele A-fib CVR. DD2 diet, nectar thick liquids.Takes pills whole with thickened water. Up to bedside commode with rick steady. Incontinent at times. Denies pain. Pt scoring green on the Aggression Stop Light Tool. Discharge to TCU pending.

## 2021-07-01 NOTE — PROGRESS NOTES
Care Management Follow Up    Length of Stay (days): 5    Expected Discharge Date: 07/02/21(TCU)     Concerns to be Addressed: discharge planning  [place] [facility]  Patient plan of care discussed at interdisciplinary rounds: Yes    Anticipated Discharge Disposition: Transitional Care     AReferrals Placed by CM/SW:    Private pay costs discussed: insurance costs co-pays-TBD    Additional Information:  Writer sent referral to [facility] for benefit check but patient appears to be needing TCU. Will update note with home benefit check incase patient needs further home IV Abx after a TCU stay.      Candie Kerr RN

## 2021-07-02 ENCOUNTER — APPOINTMENT (OUTPATIENT)
Dept: CT IMAGING | Facility: CLINIC | Age: 86
DRG: 288 | End: 2021-07-02
Attending: PSYCHIATRY & NEUROLOGY
Payer: MEDICARE

## 2021-07-02 ENCOUNTER — APPOINTMENT (OUTPATIENT)
Dept: SPEECH THERAPY | Facility: CLINIC | Age: 86
DRG: 288 | End: 2021-07-02
Attending: HOSPITALIST
Payer: MEDICARE

## 2021-07-02 ENCOUNTER — APPOINTMENT (OUTPATIENT)
Dept: MRI IMAGING | Facility: CLINIC | Age: 86
DRG: 288 | End: 2021-07-02
Attending: PSYCHIATRY & NEUROLOGY
Payer: MEDICARE

## 2021-07-02 ENCOUNTER — APPOINTMENT (OUTPATIENT)
Dept: OCCUPATIONAL THERAPY | Facility: CLINIC | Age: 86
DRG: 288 | End: 2021-07-02
Attending: HOSPITALIST
Payer: MEDICARE

## 2021-07-02 LAB
ALBUMIN SERPL-MCNC: 1.9 G/DL (ref 3.4–5)
ALP SERPL-CCNC: 79 U/L (ref 40–150)
ALT SERPL W P-5'-P-CCNC: 10 U/L (ref 0–50)
ANION GAP SERPL CALCULATED.3IONS-SCNC: 5 MMOL/L (ref 3–14)
AST SERPL W P-5'-P-CCNC: 13 U/L (ref 0–45)
BILIRUB SERPL-MCNC: 0.3 MG/DL (ref 0.2–1.3)
BUN SERPL-MCNC: 10 MG/DL (ref 7–30)
CALCIUM SERPL-MCNC: 8.3 MG/DL (ref 8.5–10.1)
CHLORIDE SERPL-SCNC: 117 MMOL/L (ref 94–109)
CO2 SERPL-SCNC: 25 MMOL/L (ref 20–32)
CREAT SERPL-MCNC: 1.09 MG/DL (ref 0.52–1.04)
ERYTHROCYTE [DISTWIDTH] IN BLOOD BY AUTOMATED COUNT: 16.7 % (ref 10–15)
GFR SERPL CREATININE-BSD FRML MDRD: 45 ML/MIN/{1.73_M2}
GLUCOSE BLDC GLUCOMTR-MCNC: 94 MG/DL (ref 70–99)
GLUCOSE SERPL-MCNC: 96 MG/DL (ref 70–99)
HBA1C MFR BLD: 5.9 % (ref 0–5.6)
HCT VFR BLD AUTO: 30.3 % (ref 35–47)
HGB BLD-MCNC: 9.3 G/DL (ref 11.7–15.7)
INR PPP: 2.04 (ref 0.86–1.14)
MCH RBC QN AUTO: 25.8 PG (ref 26.5–33)
MCHC RBC AUTO-ENTMCNC: 30.7 G/DL (ref 31.5–36.5)
MCV RBC AUTO: 84 FL (ref 78–100)
PHOSPHATE SERPL-MCNC: 2.9 MG/DL (ref 2.5–4.5)
PLATELET # BLD AUTO: 296 10E9/L (ref 150–450)
POTASSIUM SERPL-SCNC: 3.1 MMOL/L (ref 3.4–5.3)
POTASSIUM SERPL-SCNC: 3.3 MMOL/L (ref 3.4–5.3)
POTASSIUM SERPL-SCNC: 3.3 MMOL/L (ref 3.4–5.3)
PROT SERPL-MCNC: 7.2 G/DL (ref 6.8–8.8)
RBC # BLD AUTO: 3.61 10E12/L (ref 3.8–5.2)
SODIUM SERPL-SCNC: 147 MMOL/L (ref 133–144)
UFH PPP CHRO-ACNC: 0.81 IU/ML
UFH PPP CHRO-ACNC: 0.92 IU/ML
UFH PPP CHRO-ACNC: 1.08 IU/ML
WBC # BLD AUTO: 9 10E9/L (ref 4–11)

## 2021-07-02 PROCEDURE — 92526 ORAL FUNCTION THERAPY: CPT | Mod: GN | Performed by: SPEECH-LANGUAGE PATHOLOGIST

## 2021-07-02 PROCEDURE — 99232 SBSQ HOSP IP/OBS MODERATE 35: CPT | Performed by: HOSPITALIST

## 2021-07-02 PROCEDURE — 36415 COLL VENOUS BLD VENIPUNCTURE: CPT | Performed by: INTERNAL MEDICINE

## 2021-07-02 PROCEDURE — 120N000001 HC R&B MED SURG/OB

## 2021-07-02 PROCEDURE — 85520 HEPARIN ASSAY: CPT | Performed by: HOSPITALIST

## 2021-07-02 PROCEDURE — 85027 COMPLETE CBC AUTOMATED: CPT | Performed by: INTERNAL MEDICINE

## 2021-07-02 PROCEDURE — 36415 COLL VENOUS BLD VENIPUNCTURE: CPT | Performed by: HOSPITALIST

## 2021-07-02 PROCEDURE — 250N000013 HC RX MED GY IP 250 OP 250 PS 637: Performed by: PHYSICIAN ASSISTANT

## 2021-07-02 PROCEDURE — 70496 CT ANGIOGRAPHY HEAD: CPT | Mod: ME

## 2021-07-02 PROCEDURE — 85520 HEPARIN ASSAY: CPT | Performed by: INTERNAL MEDICINE

## 2021-07-02 PROCEDURE — 84100 ASSAY OF PHOSPHORUS: CPT | Performed by: INTERNAL MEDICINE

## 2021-07-02 PROCEDURE — 250N000013 HC RX MED GY IP 250 OP 250 PS 637: Performed by: INTERNAL MEDICINE

## 2021-07-02 PROCEDURE — 250N000013 HC RX MED GY IP 250 OP 250 PS 637: Performed by: HOSPITALIST

## 2021-07-02 PROCEDURE — 70553 MRI BRAIN STEM W/O & W/DYE: CPT | Mod: MG

## 2021-07-02 PROCEDURE — A9585 GADOBUTROL INJECTION: HCPCS | Performed by: INTERNAL MEDICINE

## 2021-07-02 PROCEDURE — 83036 HEMOGLOBIN GLYCOSYLATED A1C: CPT | Performed by: INTERNAL MEDICINE

## 2021-07-02 PROCEDURE — 80053 COMPREHEN METABOLIC PANEL: CPT | Performed by: INTERNAL MEDICINE

## 2021-07-02 PROCEDURE — 250N000011 HC RX IP 250 OP 636: Performed by: HOSPITALIST

## 2021-07-02 PROCEDURE — 85610 PROTHROMBIN TIME: CPT | Performed by: INTERNAL MEDICINE

## 2021-07-02 PROCEDURE — 250N000011 HC RX IP 250 OP 636: Performed by: INTERNAL MEDICINE

## 2021-07-02 PROCEDURE — 250N000009 HC RX 250: Performed by: PSYCHIATRY & NEUROLOGY

## 2021-07-02 PROCEDURE — 84132 ASSAY OF SERUM POTASSIUM: CPT | Performed by: HOSPITALIST

## 2021-07-02 PROCEDURE — 255N000002 HC RX 255 OP 636: Performed by: INTERNAL MEDICINE

## 2021-07-02 PROCEDURE — 99232 SBSQ HOSP IP/OBS MODERATE 35: CPT | Mod: GC | Performed by: PSYCHIATRY & NEUROLOGY

## 2021-07-02 PROCEDURE — 97530 THERAPEUTIC ACTIVITIES: CPT | Mod: GO

## 2021-07-02 PROCEDURE — 250N000011 HC RX IP 250 OP 636: Performed by: PSYCHIATRY & NEUROLOGY

## 2021-07-02 RX ORDER — POTASSIUM CHLORIDE 1500 MG/1
20 TABLET, EXTENDED RELEASE ORAL ONCE
Status: COMPLETED | OUTPATIENT
Start: 2021-07-02 | End: 2021-07-02

## 2021-07-02 RX ORDER — GADOBUTROL 604.72 MG/ML
8 INJECTION INTRAVENOUS ONCE
Status: DISCONTINUED | OUTPATIENT
Start: 2021-07-02 | End: 2021-07-06 | Stop reason: CLARIF

## 2021-07-02 RX ORDER — IOPAMIDOL 755 MG/ML
70 INJECTION, SOLUTION INTRAVASCULAR ONCE
Status: COMPLETED | OUTPATIENT
Start: 2021-07-02 | End: 2021-07-02

## 2021-07-02 RX ORDER — AMPICILLIN 2 G/1
2 INJECTION, POWDER, FOR SOLUTION INTRAVENOUS EVERY 6 HOURS
Qty: 1120 ML | Refills: 0 | Status: SHIPPED | OUTPATIENT
Start: 2021-07-02 | End: 2021-08-06

## 2021-07-02 RX ORDER — WARFARIN SODIUM 3 MG/1
3 TABLET ORAL
Status: DISCONTINUED | OUTPATIENT
Start: 2021-07-02 | End: 2021-07-02

## 2021-07-02 RX ORDER — CEFTRIAXONE 1 G/1
2000 INJECTION, POWDER, FOR SOLUTION INTRAMUSCULAR; INTRAVENOUS DAILY
Qty: 600 ML | Refills: 0 | Status: SHIPPED | OUTPATIENT
Start: 2021-07-02 | End: 2021-08-06

## 2021-07-02 RX ADMIN — HEPARIN SODIUM 750 UNITS/HR: 10000 INJECTION, SOLUTION INTRAVENOUS at 03:05

## 2021-07-02 RX ADMIN — SODIUM CHLORIDE 90 ML: 9 INJECTION, SOLUTION INTRAVENOUS at 17:51

## 2021-07-02 RX ADMIN — GABAPENTIN 200 MG: 100 CAPSULE ORAL at 21:15

## 2021-07-02 RX ADMIN — AMPICILLIN SODIUM 2 G: 2 INJECTION, POWDER, FOR SOLUTION INTRAVENOUS at 05:44

## 2021-07-02 RX ADMIN — IOPAMIDOL 70 ML: 755 INJECTION, SOLUTION INTRAVENOUS at 17:51

## 2021-07-02 RX ADMIN — CYANOCOBALAMIN TAB 1000 MCG 1000 MCG: 1000 TAB at 08:23

## 2021-07-02 RX ADMIN — PANTOPRAZOLE SODIUM 40 MG: 40 TABLET, DELAYED RELEASE ORAL at 16:44

## 2021-07-02 RX ADMIN — HEPARIN SODIUM 450 UNITS/HR: 10000 INJECTION, SOLUTION INTRAVENOUS at 12:20

## 2021-07-02 RX ADMIN — FOLIC ACID 2000 MCG: 1 TABLET ORAL at 08:23

## 2021-07-02 RX ADMIN — POTASSIUM CHLORIDE 20 MEQ: 1500 TABLET, EXTENDED RELEASE ORAL at 10:40

## 2021-07-02 RX ADMIN — CEFTRIAXONE SODIUM 2 G: 2 INJECTION, POWDER, FOR SOLUTION INTRAMUSCULAR; INTRAVENOUS at 16:44

## 2021-07-02 RX ADMIN — METOPROLOL TARTRATE 12.5 MG: 25 TABLET, FILM COATED ORAL at 21:15

## 2021-07-02 RX ADMIN — GADOBUTROL 8 ML: 604.72 INJECTION INTRAVENOUS at 18:58

## 2021-07-02 RX ADMIN — POTASSIUM CHLORIDE 20 MEQ: 1500 TABLET, EXTENDED RELEASE ORAL at 03:33

## 2021-07-02 RX ADMIN — ATORVASTATIN CALCIUM 20 MG: 20 TABLET, FILM COATED ORAL at 20:01

## 2021-07-02 RX ADMIN — PANTOPRAZOLE SODIUM 40 MG: 40 TABLET, DELAYED RELEASE ORAL at 05:43

## 2021-07-02 RX ADMIN — AMPICILLIN SODIUM 2 G: 2 INJECTION, POWDER, FOR SOLUTION INTRAVENOUS at 19:11

## 2021-07-02 RX ADMIN — METOPROLOL TARTRATE 12.5 MG: 25 TABLET, FILM COATED ORAL at 08:23

## 2021-07-02 RX ADMIN — AMPICILLIN SODIUM 2 G: 2 INJECTION, POWDER, FOR SOLUTION INTRAVENOUS at 12:58

## 2021-07-02 RX ADMIN — ACETAMINOPHEN 1000 MG: 500 TABLET, FILM COATED ORAL at 08:24

## 2021-07-02 ASSESSMENT — ACTIVITIES OF DAILY LIVING (ADL)
ADLS_ACUITY_SCORE: 23
ADLS_ACUITY_SCORE: 20
ADLS_ACUITY_SCORE: 22
ADLS_ACUITY_SCORE: 23
ADLS_ACUITY_SCORE: 23
ADLS_ACUITY_SCORE: 22

## 2021-07-02 NOTE — PROGRESS NOTES
[facility]  Infectious Disease Progress Note          Assessment and Plan:   IMPRESSION:    1.  An 89-year-old female admitted with new cerebrovascular infarction with basilar artery thrombosis seen on imaging.  2.  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  3.  Prior cerebrovascular infarction earlier this year. I see no evidence of blood cultures done at that time.  4.  History of recurrent urinary infections have included some systemic symptoms intermittently, never any true lower urinary symptoms and her cultures all grew mixed eleni.  5.  History of aortic valve replacement with complex aortic graft material.  6.  Chronic renal insufficiency.  7.  Atrial fibrillation.     RECOMMENDATIONS:     1.  IV ampicillin and synergistic ceftriaxone.  2.  Follow serial blood cultures until clearing. So far 6/30 still  neg, both 6/28 BC now + while 2 days into effective tx  3.  Microbiology already essentially defines endovacular infection, almost certainly has aortic valve infection, now in retrospect have to consider possibility of multiple emboli related to this. [facility]  4.  Usual medical approach is transesophageal echocardiogram,  despite normal transthoracic echocardiogram,  to define anatomy, RO abscess etc , Also  possibly some further imaging also of the aortic graft material. However some risk and likely not a surgical candidate regardless of findings so I am Ok not doing this  Some comfort care discussion but long talk pt/son I think they want tx   5 1 day more hold on PICC line if 6/30  BC neg tomorrow Ok PICC  Assume rehab so orders in for conventional amp/ceftriaxone(if home would do amp via pump q 24 hrs).           Interval History:  "  no new complaints and doing well; no cp, sob, n/v/d, or abd pain. Feels well, no new neuro sxs BC as above              Medications:       ampicillin  2 g Intravenous Q6H     atorvastatin  20 mg Oral QPM     cefTRIAXone  2 g Intravenous Q24H     cyanocobalamin  1,000 mcg Oral Daily     folic acid  2,000 mcg Oral Daily     gabapentin  200 mg Oral At Bedtime     gadobutrol  8 mL Intravenous Once     metoprolol tartrate  12.5 mg Oral BID     pantoprazole  40 mg Oral BID AC     sodium chloride 0.9 %  100 mL As instructed Once     sodium chloride (PF)  3 mL Intracatheter Q8H                  Physical Exam:   Blood pressure (!) 155/80, pulse 80, temperature 97.5  F (36.4  C), temperature source Oral, resp. rate 16, height 1.549 m (5' 1\"), weight 80.1 kg (176 lb 9.4 oz), SpO2 97 %.  Wt Readings from Last 2 Encounters:   06/28/21 80.1 kg (176 lb 9.4 oz)     Vital Signs with Ranges  Temp:  [97.5  F (36.4  C)-98.4  F (36.9  C)] 97.5  F (36.4  C)  Pulse:  [75-90] 80  Resp:  [16] 16  BP: (129-156)/(68-81) 155/80  SpO2:  [95 %-97 %] 97 %    Constitutional: Awake, alert, cooperative, no apparent distress   Lungs: Clear to auscultation bilaterally, no crackles or wheezing   Cardiovascular: Regular rate and rhythm, normal S1 and S2, and no murmur noted   Abdomen: Normal bowel sounds, soft, non-distended, non-tender   Skin: No rashes, no cyanosis, no edema   Other:           Data:   All microbiology laboratory data reviewed.  Recent Labs   Lab Test 07/02/21  0906 07/01/21  0712 06/30/21  0619   WBC 9.0 8.8 7.1   HGB 9.3* 8.6* 7.7*   HCT 30.3* 28.2* 25.9*   MCV 84 84 86    261 221     Recent Labs   Lab Test 07/02/21  0906 07/01/21  0712 06/30/21  0619   CR 1.09* 1.25* 1.28*     No lab results found.  Recent Labs   Lab Test 07/01/21  0852 06/30/21  1316 06/28/21  1220   CULT No growth after 18 hours No growth after 2 days  No growth after 2 days Cultured on the 2nd day of incubation:  Gram positive cocci in pairs and " chains  *  Critical Value/Significant Value, preliminary result only, called to and read back by  LUIS M MENJIVAR, RN 1350 6.30.21 NDP    Culture in progress  Cultured on the 1st day of incubation:  Enterococcus faecalis  *  Critical Value/Significant Value, preliminary result only, called to and read back by  ANTHONY BERRY RN @2136 6.29.21 LM    (Note)  POSITIVE for ENTEROCOCCUS FAECALIS and NEGATIVE for Star/vanB genes  by [employer] multiplex nucleic acid test. Final identification and  antimicrobial susceptibility testing will be verified by standard  methods.    Specimen tested with [employer] multiplex, gram-positive blood culture  nucleic acid test for the following targets: Staph aureus, Staph  epidermidis, Staph lugdunensis, other Staph species, Enterococcus  faecalis, Enterococcus faecium, Streptococcus species, S. agalactiae,  S. anginosus grp., S. pneumoniae, S. pyogenes, Listeria sp., mecA  (methicillin resistance) and Star/B (vancomycin resistance).    Critical Value/Significant Value called to and read back by  Anthony Berry RN 6/30/21 @ 0045 TF

## 2021-07-02 NOTE — PROGRESS NOTES
[facility]  Hospitalist Progress Note        Jossue ACEVEDOMilana Lemos, DO  07/02/2021        Interval History:      Unchanged clinically. Encourage fluids. On heparin, and holding warfarin for procedure. Repeat MRI and CTA planned for today. SLP advanced diet.          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 No invasive intervention recommended by the Neurology Repeat CT head shows Possible subacute ischemic infarct in the right frontal and left occipital lobe, CTA head shows nonocclusive thrombus at the proxima basilar artery remain stable. MRI of the brain shows acute/early subacute infarct at the right joey/medullary junction No other significant past medical history or family history. No hemorrhage.   - Neurology.   - MARK ANTHONY discussed with patient family and cardiology. Family considering risk/benefit. From the neurological perspective, they are desiring MARK ANTHONY to evaluate for endocarditis with direct valvular involvement, and which point there may be less benefit for anticoagulant  - Neurologic monitoring.   - statin.   - Therapies following.   - Heparin gtt  - Hold warfarin for now  pending need prior to MARK ANTHONY  - Neurology recommends anticoagulation for now  - no current plans for comfort based approach    Permanent atrial fib She is on Metoprolol,   - oral Metoprolol   - Neurology managing anticoagulation.      Possible Community acquired Pneumonia vs Aspiration Pneumonia.   High grade Enterococcus fecalis bacteremia   She reportedly had fever of 101.5 at outside hospital, her Xray chest on admission shows patchy bibasilar opacities suspicious for Pneumonic infiltrate.   - Ampicillin. Ceftriaxone. Defer to ID.   - ID following.   - Repeat blood culture on 6/29 positive, ID following.   - MARK ANTHONY ordered, however, cardiology requesting GI consult.   - GI consult at Cards request.     Acute Renal Failure. Patient has recently elevated creatinine in the range of 1.2-1.4, but in may of 2021 and march of 2021 she has normal creatinine. Most likely develop ARF during her admission with GI bleeding and likely in ATN at this time. She received IV contrast twice for CTA head and neck and perfusion,.   - Avoid NSAID's and nephrotoxic medications.      Chronic Anemia, H/O Recent Upper GI bleeding secondary to Angio ectasia Patient appears to have chronic anemia with recent acute on chronic anemia secondary to GI bleeding because of   duodenal angioectasia s/p clip placement.   - Protonix      History of stroke prior old Right frontal, and recently left Cerebellar on 4/8/21, and unable to walk related to left leg weakness Wheel chair bound.   - Monitor.      Benign essential hypertensionOn Metoprolol 12.5 mg bid at home.   - oral metoprolol bid.     S/P AVR & Ascending Aortic Aneurysm repair Stable.  - Monitor.      Bilateral Lower Extremity edema and Weakness. She has 1+ LE edema, most likely chronic, per report. US Venous Bilateral LE negative for DVT  - Monitor.      Hypokalemia, Hypernatremia. Hypernatremia likely because of NS, per report.   - Replace potassium as per protocol.  - Monitor and correct.  "     DVT Prophylaxis: Heparin gtt. Defer to Neuro.     Code Status: No CPR- Do NOT Intubate    Disposition: MARK ANTHONY planned for Monday for now. Continue heparin                   Physical Exam:           Blood pressure (!) 140/81, pulse 75, temperature 97.7  F (36.5  C), temperature source Oral, resp. rate 16, height 1.549 m (5' 1\"), weight 80.1 kg (176 lb 9.4 oz), SpO2 96 %.    Vitals:    21 0700 21 0400 21 1704   Weight: 81.6 kg (179 lb 14.3 oz) 83.6 kg (184 lb 4.9 oz) 80.1 kg (176 lb 9.4 oz)       Vital Sign Ranges  Temperature Temp  Av.3  F (36.8  C)  Min: 98.1  F (36.7  C)  Max: 98.5  F (36.9  C)   Blood pressure Systolic (24hrs), Av , Min:104 , Max:135        Diastolic (24hrs), Av, Min:44, Max:69      Pulse Pulse  Av.3  Min: 73  Max: 86   Respirations Resp  Av  Min: 16  Max: 16   Pulse oximetry SpO2  Av %  Min: 93 %  Max: 95 %     Vital Signs with Ranges  Temp:  [97.7  F (36.5  C)-98.4  F (36.9  C)] 97.7  F (36.5  C)  Pulse:  [75-90] 75  Resp:  [16] 16  BP: (129-156)/(68-81) 140/81  SpO2:  [94 %-96 %] 96 %    I/O Last 3 Shifts:   I/O last 3 completed shifts:  In: 360 [P.O.:360]  Out: 1000 [Urine:1000]    I/O past 24 hours:     Intake/Output Summary (Last 24 hours) at 2021 0726  Last data filed at 2021 1438  Gross per 24 hour   Intake 940 ml   Output --   Net 940 ml     GENERAL: Alert and confused. NAD. Conversational.   HEENT: Normocephalic. EOMI. No icterus or injection. Nares normal.   LUNGS: Clear to auscultation. No dyspnea at rest.   HEART: Regular rate. Extremities perfused.   ABDOMEN: Soft, nontender, and nondistended. Positive bowel sounds.   EXTREMITIES: LE edema noted.   NEUROLOGIC: Moves extremities x4, follows commands. right facial droop,  Moving both upper extremities, weakness of both lower extremities.          Prior to Admission Medications:        Medications Prior to Admission   Medication Sig Dispense Refill Last Dose     acetaminophen " (TYLENOL) 325 MG tablet Take 650 mg by mouth every 8 hours as needed for mild pain   Past Month at seldom     apixaban ANTICOAGULANT (ELIQUIS) 5 MG tablet Take 5 mg by mouth 2 times daily   6/25/2021 at pm     atorvastatin (LIPITOR) 40 MG tablet Take 40 mg by mouth At Bedtime   6/25/2021 at pm     cyanocobalamin (VITAMIN B-12) 1000 MCG tablet Take 1,000 mcg by mouth daily   6/25/2021 at am     ferrous sulfate (FEROSUL) 325 (65 Fe) MG tablet Take 325 mg by mouth three times a week Mon - Wed - Fri. Takes with orange juice.   6/25/2021     folic acid (FOLVITE) 400 MCG tablet Take 2,000 mcg by mouth daily 400 mcg x 5 tablets for 1 month then on 7/18/21 decrease to 400 mcg daily   6/25/2021 at am     gabapentin (NEURONTIN) 300 MG capsule Take 300 mg by mouth At Bedtime   6/25/2021 at pm     latanoprost (XALATAN) 0.005 % ophthalmic solution Place 1 drop into both eyes At Bedtime   6/25/2021 at pm     metoprolol tartrate (LOPRESSOR) 25 MG tablet Take 12.5 mg by mouth 2 times daily   6/25/2021 at pm     mirtazapine (REMERON SOL-TAB) 15 MG ODT 15 mg by Orally disintegrating tablet route At Bedtime   6/25/2021 at pm     omeprazole (PRILOSEC) 20 MG DR capsule Take 20 mg by mouth daily   6/25/2021 at am     ondansetron (ZOFRAN-ODT) 4 MG ODT tab Take 4 mg by mouth every morning   6/25/2021 at am     ondansetron (ZOFRAN-ODT) 4 MG ODT tab Take 4 mg by mouth 2 times daily as needed for nausea   Not Needed            Medications:        Current Facility-Administered Medications   Medication Last Rate     heparin 750 Units/hr (07/02/21 0305)     - MEDICATION INSTRUCTIONS -       Warfarin Therapy Reminder       Current Facility-Administered Medications   Medication Dose Route Frequency     ampicillin  2 g Intravenous Q6H     atorvastatin  20 mg Oral QPM     cefTRIAXone  2 g Intravenous Q24H     cyanocobalamin  1,000 mcg Oral Daily     folic acid  2,000 mcg Oral Daily     gabapentin  200 mg Oral At Bedtime     gadobutrol  8 mL  Intravenous Once     metoprolol tartrate  12.5 mg Oral BID     pantoprazole  40 mg Oral BID AC     potassium chloride  20 mEq Oral or Feeding Tube Once     sodium chloride 0.9 %  100 mL As instructed Once     sodium chloride (PF)  3 mL Intracatheter Q8H     Current Facility-Administered Medications   Medication Dose Route Frequency     acetaminophen  650 mg Rectal Q4H PRN     acetaminophen  1,000 mg Oral Q6H PRN     lidocaine 4%   Topical Q1H PRN     lidocaine (buffered or not buffered)  0.1-1 mL Other Q1H PRN     melatonin  1 mg Oral At Bedtime PRN     ondansetron  4 mg Oral Q6H PRN    Or     ondansetron  4 mg Intravenous Q6H PRN     - MEDICATION INSTRUCTIONS -   Does not apply Continuous PRN     sodium chloride (PF)  3 mL Intracatheter q1 min prn     Warfarin Therapy Reminder  1 each Does not apply Continuous PRN            Data:      Lab data reviewed.   Recent Labs   Lab 07/02/21  0251 07/01/21 2037 07/01/21  0712 06/30/21  0619 06/30/21  0619 06/29/21  1507 06/29/21  1507 06/29/21  0741   HGB  --   --  8.6*  --  7.7*  --   --  8.2*   MCV  --   --  84  --  86  --   --  85   PLT  --   --  261  --  221  --   --  252   INR  --   --  1.53*  --  1.50*  --  1.30*  --    NA  --   --  144  --  146*  --   --  145*   POTASSIUM 3.1* 3.2* 3.1*   < > 3.3*   < >  --  3.2*   CHLORIDE  --   --  114*  --  118*  --   --  116*   CO2  --   --  23  --  22  --   --  22   BUN  --   --  10  --  11  --   --  12   CR  --   --  1.25*  --  1.28*  --   --  1.37*   ANIONGAP  --   --  7  --  6  --   --  7   MANDI  --   --  8.1*  --  7.8*  --   --  7.8*   GLC  --   --  96  --  92  --   --  103*    < > = values in this interval not displayed.           Imaging:      Imaging data reviewed.     Jossue Lemos DO  Hospitalist [facility]  Pager: [phone]

## 2021-07-02 NOTE — PLAN OF CARE
SLP: Pt NPO for MARK ANTHONY - okay for DD2 (IDDSI level 5 minced and moist) and nectar thick liquids (IDDSI level 2 mildly thick) when okay to resume PO diet. Will continue to follow.

## 2021-07-02 NOTE — PROGRESS NOTES
[facility]    Stroke Progress Note    Interval Events  Discussed patient situation with son given possible seeding risk from infective endocarditis and risk of hemorrhagic brain bleed and the benefits of a MARK ANTHONY.  Furthermore discussed the necessity of anticoagulation given A. fib and the partial occlusive thrombus.  Patient son seemed more open to MARK ANTHONY after discussion.    Patient complained of headache which resolved with treatment.    Impression  1. 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 Size and location of infarct (pontomedullary) is more likely to be secondary to be secondary to Afib rather than endocarditis; however, due to high likelihood of active endocarditis risk of bleeding on anticoagulation is high. Plan to repeat MRI to look for cerebrovascular evidence of continued infarctions as would be expected in active endocarditis. Also repeat CTA head and neck to evaluate partial thrombus in setting of post 6 days of anticoagulation therapy.     > Due to concurrent risk factors for stroke--intraluminal thrombus in the basilar artery and history of afib--and bleeding risk--infective endocarditis--careful consideration of risks and benefits need to be considered regarding anticoagulation therapy. Due to known thrombus, would continue anticoagulation therapy with heparin gtt with low threshold to rescan patient with any changes in exam or mental status.   > Furthermore, agree with ongoing discussion with family     Stroke Evaluation summarized:  MRI/Head CT: R pontomedullary junction infarct  Intracranial Vascular Imaging: partially occlusive thrombus of the proximal basilar artery  Cervical Carotid and Vertebral Artery Vascular Imaging: no stenoses  Echocardiogram: LVEF 55-60%, severe LA enlargement, moderate  "tricuspid regurg  EKG/Telemetry: a fib  LDL: 31  A1c: 5.8  Troponin: not obtained  Other testing: Not Applicable    Plan  - Repeat MRI brain  - Repeat CTA Head and Neck  - Transesophageal echocardiogram (MARK ANTHONY)   - Defer antibiotic therapy to Infectious disease and primary team  - Continue q4 hour neurochecks while inpatient  - Continue therapeutic heparin gtt (if GI/Cardiology would prefer, ok to discontinue for up to 8 hours prior to procedure and restarted safely after procedure).   - Given active, partially occlusive thrombus on CTA, would recommend continued anticoagulation therapy despite current concern for endocarditis  - Low threshold for CT Head with any changes to exam  - Goal normotension, <130/80 or lower if tolerated for overall reduced cardiovascular risk. Please titrate enteral meds to achieve. IV PRN BP meds only for SBP >180.  - Continue decreased dose atorvastatin 20 mg daily in light of LDL 31 and increased risk of ICH in LDL<40  - Follow up with Stroke Neurology with repeat CTA (ordered for you) in 1-2 months    Vascular Neurology will continue to follow. Please do not hesitate to contact us with questions or concerns, should they arise.    The Stroke Staff is Dr. Meade.    Guilherme Croft MD ALISIA  Vascular Neurology Fellow  To page me or covering stroke neurology team member, click here: AMCOM   Choose \"On Call\" tab at top, then search dropdown box for \"Neurology Adult\", select location, press Enter, then look for stroke/neuro ICU/telestroke.    ______________________________________________________    Medications   Home Meds  Prior to Admission medications    Medication Sig Start Date End Date Taking? Authorizing Provider   acetaminophen (TYLENOL) 325 MG tablet Take 650 mg by mouth every 8 hours as needed for mild pain   Yes Unknown, Entered By History   apixaban ANTICOAGULANT (ELIQUIS) 5 MG tablet Take 5 mg by mouth 2 times daily   Yes Unknown, Entered By History   atorvastatin (LIPITOR) 40 MG " tablet Take 40 mg by mouth At Bedtime   Yes Unknown, Entered By History   cyanocobalamin (VITAMIN B-12) 1000 MCG tablet Take 1,000 mcg by mouth daily   Yes Unknown, Entered By History   ferrous sulfate (FEROSUL) 325 (65 Fe) MG tablet Take 325 mg by mouth three times a week Mon - Wed - Fri. Takes with orange juice.   Yes Unknown, Entered By History   folic acid (FOLVITE) 400 MCG tablet Take 2,000 mcg by mouth daily 400 mcg x 5 tablets for 1 month then on 7/18/21 decrease to 400 mcg daily   Yes Unknown, Entered By History   gabapentin (NEURONTIN) 300 MG capsule Take 300 mg by mouth At Bedtime   Yes Unknown, Entered By History   latanoprost (XALATAN) 0.005 % ophthalmic solution Place 1 drop into both eyes At Bedtime   Yes Unknown, Entered By History   metoprolol tartrate (LOPRESSOR) 25 MG tablet Take 12.5 mg by mouth 2 times daily   Yes Unknown, Entered By History   mirtazapine (REMERON SOL-TAB) 15 MG ODT 15 mg by Orally disintegrating tablet route At Bedtime   Yes Unknown, Entered By History   omeprazole (PRILOSEC) 20 MG DR capsule Take 20 mg by mouth daily   Yes Unknown, Entered By History   ondansetron (ZOFRAN-ODT) 4 MG ODT tab Take 4 mg by mouth every morning   Yes Unknown, Entered By History   ondansetron (ZOFRAN-ODT) 4 MG ODT tab Take 4 mg by mouth 2 times daily as needed for nausea   Yes Unknown, Entered By History       Scheduled Meds    ampicillin  2 g Intravenous Q6H     atorvastatin  20 mg Oral QPM     cefTRIAXone  2 g Intravenous Q24H     cyanocobalamin  1,000 mcg Oral Daily     folic acid  2,000 mcg Oral Daily     gabapentin  200 mg Oral At Bedtime     gadobutrol  8 mL Intravenous Once     metoprolol tartrate  12.5 mg Oral BID     pantoprazole  40 mg Oral BID AC     sodium chloride 0.9 %  100 mL As instructed Once     sodium chloride (PF)  3 mL Intracatheter Q8H       Infusion Meds    heparin Stopped (07/02/21 1025)     - MEDICATION INSTRUCTIONS -       Warfarin Therapy Reminder         PRN  Meds  acetaminophen, acetaminophen, lidocaine 4%, lidocaine (buffered or not buffered), melatonin, ondansetron **OR** ondansetron, - MEDICATION INSTRUCTIONS -, sodium chloride (PF), Warfarin Therapy Reminder       PHYSICAL EXAMINATION  Temp:  [97.5  F (36.4  C)-98.4  F (36.9  C)] 97.5  F (36.4  C)  Pulse:  [75-90] 80  Resp:  [16] 16  BP: (129-156)/(68-81) 155/80  SpO2:  [95 %-97 %] 97 %     General:  patient lying in bed without any acute distress    HEENT:  normocephalic/atraumatic, edentulous  Cardio:  -  Pulmonary:  no respiratory distress  Abdomen:  soft, non-tender, non-distended  Extremities:  no edema, peripheral pulses palpable  Skin:  intact, warm/dry      Neurologic  Mental Status:  Eyes open spontaneously, alert and attentive, oriented to self, time, place and circumstnce and hospital, but not time or circumstance. Spontaneous language is fluent and coherent with intact comprehension of simple commands, naming, and repetition.  Cranial Nerves:  VFF, pupils 2 mm, round, dysconjugate gaze at rest, L eye exotropia, right facial droop, mild dysarthria  Motor:  normal muscle tone and bulk, no abnormal movements, able to move all limbs spontaneously, no drift BUEs, minimal antigravity strength BLEs,   Sensory:  intact to LT in 4 ext  Coordination:  not ataxia of observable movements  Station/Gait:  not tested    Stroke Scales    NIHSS  Interval (daily exam) (06/30/21 1136)   Interval Comments     1a. Level of Consciousness 0-->Alert, keenly responsive   1b. LOC Questions 0-->Answers both questions correctly   1c. LOC Commands 0-->Performs both tasks correctly   2.   Best Gaze 0-->Normal   3.   Visual 0-->No visual loss   4.   Facial Palsy 1-->Minor paralysis (flattened nasolabial fold, asymmetry on smiling)   5a. Motor Arm, Left 0-->No drift, limb holds 90 (or 45) degrees for full 10 secs   5b. Motor Arm, Right 0-->No drift, limb holds 90 (or 45) degrees for full 10 secs   6a. Motor Leg, Left 2-->Some effort  against gravity, leg falls to bed by 5 secs, but has some effort against gravity   6b. Motor Leg, right 2-->Some effort against gravity, leg falls to bed by 5 secs, but has some effort against gravity   7.   Limb Ataxia 0-->Absent   8.   Sensory 0-->Normal, no sensory loss   9.   Best Language 0-->No aphasia, normal   10. Dysarthria 0-->Normal   11. Extinction and Inattention  0-->No abnormality   Total 5 (06/30/21 1136)       Imaging  I personally reviewed all imaging; relevant findings per HPI.     Lab Results Data   CBC  Recent Labs   Lab 07/02/21  0906 07/01/21 0712 06/30/21 0619   WBC 9.0 8.8 7.1   RBC 3.61* 3.35* 3.01*   HGB 9.3* 8.6* 7.7*   HCT 30.3* 28.2* 25.9*    261 221     Basic Metabolic Panel    Recent Labs   Lab 07/02/21  0906 07/02/21  0251 07/01/21 2037 07/01/21 0712 06/30/21 0619 06/30/21 0619   *  --   --  144  --  146*   POTASSIUM 3.3* 3.1* 3.2* 3.1*   < > 3.3*   CHLORIDE 117*  --   --  114*  --  118*   CO2 25  --   --  23  --  22   BUN 10  --   --  10  --  11   CR 1.09*  --   --  1.25*  --  1.28*   GLC 96  --   --  96  --  92   MANDI 8.3*  --   --  8.1*  --  7.8*    < > = values in this interval not displayed.     Liver Panel  Recent Labs   Lab 07/02/21  0906 07/01/21  0712 06/30/21 0619 06/28/21  0855 06/28/21  0855   PROTTOTAL 7.2  --   --   --  6.3*   ALBUMIN 1.9* 1.7* 1.5*   < > 1.6*   BILITOTAL 0.3  --   --   --  0.3   ALKPHOS 79  --   --   --  77   AST 13  --   --   --  16   ALT 10  --   --   --  13    < > = values in this interval not displayed.     INR    Recent Labs   Lab Test 07/02/21  0906 07/01/21  0712 06/30/21  0619   INR 2.04* 1.53* 1.50*      Lipid Profile    Recent Labs   Lab Test 06/27/21  0725   CHOL 65   HDL 18*   LDL 31   TRIG 80     A1C    Recent Labs   Lab Test 07/02/21  0906   A1C 5.9*     Troponin I  No results for input(s): TROPI in the last 168 hours.

## 2021-07-02 NOTE — PROGRESS NOTES
Son is here and okay to go through with MARK ANTHONY. He informed the doctors about it. Working on seeing if this can be done today or Monday. Care suites will get back to us about that. Neuro ordered a MRI and CT scan for today. Potassium came back at 3.3 so it is being replaced now, recheck is at 1430. Heparin protocol RN put in, orders to stop for an hour and rerun at 300 less of current rate.

## 2021-07-02 NOTE — PROGRESS NOTES
"SPIRITUAL HEALTH SERVICES  SPIRITUAL ASSESSMENT Progress Note  [facility]     REFERRAL SOURCE: Follow Up    I shared a brief visit with Ekaterina today as she had visited with a  earlier this week at admission and asked for follow ups during her stay. I introduced self/role and availability of shs.    -Ekaterina shares she is doing well today. Her son, Franko is at bedside and she shares it is so nice to have him here \"to visit and to understand all this stuff.\" She expressed it is hard for her to track all of what is being shared about her health.    -Ekaterina shares she would like to visit and shares stopping by another time when her family isn't visiting would be best. I shared I will try to connect with her another time and offered words of support.     PLAN: I will plan to follow up with Ekaterina in the coming days.     Dulce Otoole  Associate    Phone: [phone]  Pager: [phone]    "

## 2021-07-02 NOTE — PROVIDER NOTIFICATION
MD Notification    Notified Person: SHAHRIAR Dailey     741-1 RS Pt's son said neuro team said to discontinue heparin for 8 hours? Can you confirm or advise this please there are no orders or notes in for this. Thanks. Kaylen Landry RN *[id number]     Notification Date/Time:    Notification Interaction:    Purpose of Notification: This will be up to the heart doctor who does the MARK ANTHONY, will reach out to them.    Orders Received:    Comments:

## 2021-07-02 NOTE — PLAN OF CARE
Pt here with Basilar artery thrombosis and stroke. A&Ox4. Neuros with slight R facial droop, generalized weakness to BLE, RLE weaker than LLE. VSS. Tele afib cvr. Inc of bowel and bladder. Pure wick in place. dd2 diet, nectar thick liquids. Takes pills whole. Up with A2 rick steady. C/o headache, decreased with tylenol. K+ being replaced, redraw at 0800. Hep drip running at 750 units/hr. HepXa draw at 0900. Pt scoring green on the Aggression Stop Light Tool. Plan pending.

## 2021-07-02 NOTE — PLAN OF CARE
AOX4. VSS on room air. Tele A fib with CVR. Neuros intact ex R facial droop and some r weakness lower extremity. C/O headache at times, managed with PRN tylenol. Up with assist of 2 Juliana steady or pivot. Chair bound mostly. Had large bm this shift. Voiding adequately with purewick. Incontinent. Son Franko was here this AM. He will be back tomorrow.  Tolerating DD2 diet and now on thin liquids per speech. PIV infusing heparin at 450. MRI and CT imaging pending. Plan for MARK ANTHONY Monday. Wait to hear back from MARK ANTHONY doctor to see when to stop heparin and will be NPO Sunday night for this procedure. Pt, OT, and speech all following. Potassium was replaced and needs to be rechecked this afternoon. Hep10A level will be redrawn. Sodium is elevated, encourage patient to drink more fluids per MD. Discharge pending.

## 2021-07-02 NOTE — PROVIDER NOTIFICATION
"MD Notification    Notified Person: MD    Notified Person Name: Dr. Croft    Notification Date/Time: 7/1/21 at 2213    Notification Interaction: amcom    Purpose of Notification: \"GELACIO Lizama -  741-1 - RS - Did you want patient on hep gtt? Recently ordered by hospitalist and then discontinued. Lovenox also d/c'd. Please clarify with MAEVE Decker. Thank you! [phone]\"    Orders Received:    Comments:    "

## 2021-07-03 ENCOUNTER — APPOINTMENT (OUTPATIENT)
Dept: SPEECH THERAPY | Facility: CLINIC | Age: 86
DRG: 288 | End: 2021-07-03
Attending: HOSPITALIST
Payer: MEDICARE

## 2021-07-03 ENCOUNTER — APPOINTMENT (OUTPATIENT)
Dept: PHYSICAL THERAPY | Facility: CLINIC | Age: 86
DRG: 288 | End: 2021-07-03
Attending: HOSPITALIST
Payer: MEDICARE

## 2021-07-03 LAB
ANION GAP SERPL CALCULATED.3IONS-SCNC: 5 MMOL/L (ref 3–14)
BACTERIA SPEC CULT: ABNORMAL
BACTERIA SPEC CULT: ABNORMAL
BUN SERPL-MCNC: 9 MG/DL (ref 7–30)
CALCIUM SERPL-MCNC: 8.4 MG/DL (ref 8.5–10.1)
CHLORIDE SERPL-SCNC: 113 MMOL/L (ref 94–109)
CO2 SERPL-SCNC: 25 MMOL/L (ref 20–32)
CREAT SERPL-MCNC: 0.94 MG/DL (ref 0.52–1.04)
ERYTHROCYTE [DISTWIDTH] IN BLOOD BY AUTOMATED COUNT: 17 % (ref 10–15)
GFR SERPL CREATININE-BSD FRML MDRD: 54 ML/MIN/{1.73_M2}
GLUCOSE BLDC GLUCOMTR-MCNC: 99 MG/DL (ref 70–99)
GLUCOSE SERPL-MCNC: 88 MG/DL (ref 70–99)
HCT VFR BLD AUTO: 29.9 % (ref 35–47)
HGB BLD-MCNC: 9.2 G/DL (ref 11.7–15.7)
INR PPP: 2.64 (ref 0.86–1.14)
Lab: ABNORMAL
MAGNESIUM SERPL-MCNC: 1.8 MG/DL (ref 1.6–2.3)
MCH RBC QN AUTO: 25.7 PG (ref 26.5–33)
MCHC RBC AUTO-ENTMCNC: 30.8 G/DL (ref 31.5–36.5)
MCV RBC AUTO: 84 FL (ref 78–100)
PLATELET # BLD AUTO: 322 10E9/L (ref 150–450)
POTASSIUM SERPL-SCNC: 3.2 MMOL/L (ref 3.4–5.3)
POTASSIUM SERPL-SCNC: 3.3 MMOL/L (ref 3.4–5.3)
POTASSIUM SERPL-SCNC: 3.9 MMOL/L (ref 3.4–5.3)
RBC # BLD AUTO: 3.58 10E12/L (ref 3.8–5.2)
SODIUM SERPL-SCNC: 143 MMOL/L (ref 133–144)
SPECIMEN SOURCE: ABNORMAL
UFH PPP CHRO-ACNC: 0.35 IU/ML
UFH PPP CHRO-ACNC: 0.45 IU/ML
WBC # BLD AUTO: 8.8 10E9/L (ref 4–11)

## 2021-07-03 PROCEDURE — 36415 COLL VENOUS BLD VENIPUNCTURE: CPT | Performed by: SPECIALIST

## 2021-07-03 PROCEDURE — 92526 ORAL FUNCTION THERAPY: CPT | Mod: GN

## 2021-07-03 PROCEDURE — 36415 COLL VENOUS BLD VENIPUNCTURE: CPT | Performed by: HOSPITALIST

## 2021-07-03 PROCEDURE — 120N000001 HC R&B MED SURG/OB

## 2021-07-03 PROCEDURE — 250N000011 HC RX IP 250 OP 636: Performed by: INTERNAL MEDICINE

## 2021-07-03 PROCEDURE — 97110 THERAPEUTIC EXERCISES: CPT | Mod: GP

## 2021-07-03 PROCEDURE — 87040 BLOOD CULTURE FOR BACTERIA: CPT | Performed by: SPECIALIST

## 2021-07-03 PROCEDURE — 999N001017 HC STATISTIC GLUCOSE BY METER IP

## 2021-07-03 PROCEDURE — 36415 COLL VENOUS BLD VENIPUNCTURE: CPT | Performed by: STUDENT IN AN ORGANIZED HEALTH CARE EDUCATION/TRAINING PROGRAM

## 2021-07-03 PROCEDURE — 250N000013 HC RX MED GY IP 250 OP 250 PS 637: Performed by: INTERNAL MEDICINE

## 2021-07-03 PROCEDURE — 84132 ASSAY OF SERUM POTASSIUM: CPT | Performed by: HOSPITALIST

## 2021-07-03 PROCEDURE — 99233 SBSQ HOSP IP/OBS HIGH 50: CPT | Performed by: INTERNAL MEDICINE

## 2021-07-03 PROCEDURE — 250N000011 HC RX IP 250 OP 636: Performed by: SPECIALIST

## 2021-07-03 PROCEDURE — 85027 COMPLETE CBC AUTOMATED: CPT | Performed by: STUDENT IN AN ORGANIZED HEALTH CARE EDUCATION/TRAINING PROGRAM

## 2021-07-03 PROCEDURE — 85520 HEPARIN ASSAY: CPT | Performed by: HOSPITALIST

## 2021-07-03 PROCEDURE — 83735 ASSAY OF MAGNESIUM: CPT | Performed by: STUDENT IN AN ORGANIZED HEALTH CARE EDUCATION/TRAINING PROGRAM

## 2021-07-03 PROCEDURE — 99231 SBSQ HOSP IP/OBS SF/LOW 25: CPT | Mod: GC | Performed by: PSYCHIATRY & NEUROLOGY

## 2021-07-03 PROCEDURE — 85610 PROTHROMBIN TIME: CPT | Performed by: STUDENT IN AN ORGANIZED HEALTH CARE EDUCATION/TRAINING PROGRAM

## 2021-07-03 PROCEDURE — 97530 THERAPEUTIC ACTIVITIES: CPT | Mod: GP

## 2021-07-03 PROCEDURE — 250N000013 HC RX MED GY IP 250 OP 250 PS 637: Performed by: HOSPITALIST

## 2021-07-03 PROCEDURE — 80048 BASIC METABOLIC PNL TOTAL CA: CPT | Performed by: STUDENT IN AN ORGANIZED HEALTH CARE EDUCATION/TRAINING PROGRAM

## 2021-07-03 RX ORDER — METOPROLOL TARTRATE 25 MG/1
25 TABLET, FILM COATED ORAL 2 TIMES DAILY
Status: DISCONTINUED | OUTPATIENT
Start: 2021-07-03 | End: 2021-07-08 | Stop reason: HOSPADM

## 2021-07-03 RX ORDER — POTASSIUM CHLORIDE 1500 MG/1
20 TABLET, EXTENDED RELEASE ORAL ONCE
Status: COMPLETED | OUTPATIENT
Start: 2021-07-03 | End: 2021-07-03

## 2021-07-03 RX ORDER — CEFTRIAXONE 2 G/1
2 INJECTION, POWDER, FOR SOLUTION INTRAMUSCULAR; INTRAVENOUS EVERY 12 HOURS
Status: DISCONTINUED | OUTPATIENT
Start: 2021-07-03 | End: 2021-07-05

## 2021-07-03 RX ORDER — POTASSIUM CHLORIDE 1.5 G/1.58G
20 POWDER, FOR SOLUTION ORAL ONCE
Status: COMPLETED | OUTPATIENT
Start: 2021-07-03 | End: 2021-07-03

## 2021-07-03 RX ADMIN — AMPICILLIN SODIUM 2 G: 2 INJECTION, POWDER, FOR SOLUTION INTRAVENOUS at 06:59

## 2021-07-03 RX ADMIN — CEFTRIAXONE SODIUM 2 G: 2 INJECTION, POWDER, FOR SOLUTION INTRAMUSCULAR; INTRAVENOUS at 14:04

## 2021-07-03 RX ADMIN — POTASSIUM CHLORIDE 20 MEQ: 1500 TABLET, EXTENDED RELEASE ORAL at 12:16

## 2021-07-03 RX ADMIN — ONDANSETRON 4 MG: 2 INJECTION INTRAMUSCULAR; INTRAVENOUS at 09:54

## 2021-07-03 RX ADMIN — METOPROLOL TARTRATE 25 MG: 25 TABLET, FILM COATED ORAL at 19:39

## 2021-07-03 RX ADMIN — AMPICILLIN SODIUM 2 G: 2 INJECTION, POWDER, FOR SOLUTION INTRAVENOUS at 18:16

## 2021-07-03 RX ADMIN — CYANOCOBALAMIN TAB 1000 MCG 1000 MCG: 1000 TAB at 09:25

## 2021-07-03 RX ADMIN — PANTOPRAZOLE SODIUM 40 MG: 40 TABLET, DELAYED RELEASE ORAL at 07:00

## 2021-07-03 RX ADMIN — AMPICILLIN SODIUM 2 G: 2 INJECTION, POWDER, FOR SOLUTION INTRAVENOUS at 00:11

## 2021-07-03 RX ADMIN — POTASSIUM CHLORIDE 20 MEQ: 1500 TABLET, EXTENDED RELEASE ORAL at 03:44

## 2021-07-03 RX ADMIN — AMPICILLIN SODIUM 2 G: 2 INJECTION, POWDER, FOR SOLUTION INTRAVENOUS at 12:16

## 2021-07-03 RX ADMIN — METOPROLOL TARTRATE 12.5 MG: 25 TABLET, FILM COATED ORAL at 09:25

## 2021-07-03 RX ADMIN — PANTOPRAZOLE SODIUM 40 MG: 40 TABLET, DELAYED RELEASE ORAL at 16:32

## 2021-07-03 RX ADMIN — FOLIC ACID 2000 MCG: 1 TABLET ORAL at 09:25

## 2021-07-03 RX ADMIN — GABAPENTIN 200 MG: 100 CAPSULE ORAL at 19:39

## 2021-07-03 RX ADMIN — POTASSIUM CHLORIDE 20 MEQ: 1.5 POWDER, FOR SOLUTION ORAL at 18:16

## 2021-07-03 RX ADMIN — ATORVASTATIN CALCIUM 20 MG: 20 TABLET, FILM COATED ORAL at 19:39

## 2021-07-03 ASSESSMENT — ACTIVITIES OF DAILY LIVING (ADL)
ADLS_ACUITY_SCORE: 22
ADLS_ACUITY_SCORE: 20

## 2021-07-03 NOTE — PLAN OF CARE
Patient remains neurologically intact throughout shift. Vitally stable. Up with sera steady. Tolerating regular diet. Heparin continues to infuse. Planning to discharge to TCU once placement is found. Will continue to monitor and assess patient.

## 2021-07-03 NOTE — PLAN OF CARE
Pt is AOX4, intermittent forgetfulness. VSS on room air, ex HTN. Tele A fib with CVR. Neuros intact ex R facial droop and some weakness zack lower extremity, denies pain. Up with assist of 2 Juliana steady, per pt, she iis unable to ambulate, pure wick in place draining fine,also using bedpan, 2 smear BM this shift, still needing stool sample for occult blood.Tolerating DD2, mech alt diet with thin liquids.PIV infusing heparin @150 unit/hr,last hep10a was within goal, next recheck is at 0930, k+ replaced, recheck timed at 0745. Pt/OT/speech all following. Plan is for MARK [facility]ONY Monday. Wait to hear back from [facility] doctor to see when to stop heparin and will be NPO Sunday night.

## 2021-07-03 NOTE — PROGRESS NOTES
[facility]    Stroke Progress Note    Interval Events  Patient without complaints or headache today    Impression  1. 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 Size and location of infarct (pontomedullary) is more likely to be secondary to be secondary to Afib rather than endocarditis; however, due to high likelihood of active endocarditis risk of bleeding on anticoagulation is high. Plan to repeat MRI to look for cerebrovascular evidence of continued infarctions as would be expected in active endocarditis. Also repeat CTA head and neck to evaluate partial thrombus in setting of post 6 days of anticoagulation therapy.     > Due to concurrent risk factors for stroke--intraluminal thrombus in the basilar artery and history of afib--and bleeding risk--infective endocarditis--careful consideration of risks and benefits need to be considered regarding anticoagulation therapy. Due to known thrombus, would continue anticoagulation therapy with heparin gtt with low threshold to rescan patient with any changes in exam or mental status.   > Furthermore, agree with ongoing discussion with family     Stroke Evaluation summarized:  MRI/Head CT: R pontomedullary junction infarct  Intracranial Vascular Imaging: partially occlusive thrombus of the proximal basilar artery    Repeat CTA: resolution of thrombus  Repeat MRI: pending  Cervical Carotid and Vertebral Artery Vascular Imaging: no stenoses  Echocardiogram: LVEF 55-60%, severe LA enlargement, moderate tricuspid regurg  EKG/Telemetry: a fib  LDL: 31  A1c: 5.8  Troponin: not obtained  Other testing: Not Applicable    Plan  - Transesophageal echocardiogram ([facility])   - Defer antibiotic therapy to Infectious disease and primary team  - Continue q4 hour neurochecks while inpatient  -  "Continue therapeutic heparin gtt (if GI/Cardiology would prefer, ok to discontinue for up to 8 hours prior to procedure and restarted safely after procedure).   - Low threshold for CT Head with any changes to exam  - Goal normotension, <130/80 or lower if tolerated for overall reduced cardiovascular risk. Please titrate enteral meds to achieve. IV PRN BP meds only for SBP >180.  - Continue decreased dose atorvastatin 20 mg daily in light of LDL 31 and increased risk of ICH in LDL<40  - Follow up with Stroke Neurology with repeat CTA (ordered for you) in 1-2 months    Vascular Neurology will continue to follow. Please do not hesitate to contact us with questions or concerns, should they arise.    The Stroke Staff is Dr. Meade.    Guilherme Croft MD ALISIA  Vascular Neurology Fellow  To page me or covering stroke neurology team member, click here: [facility]   Choose \"On Call\" tab at top, then search dropdown box for \"Neurology Adult\", select location, press Enter, then look for stroke/neuro ICU/telestroke.    ______________________________________________________    Medications   Home Meds  Prior to Admission medications    Medication Sig Start Date End Date Taking? Authorizing Provider   acetaminophen (TYLENOL) 325 MG tablet Take 650 mg by mouth every 8 hours as needed for mild pain   Yes Unknown, Entered By History   apixaban ANTICOAGULANT (ELIQUIS) 5 MG tablet Take 5 mg by mouth 2 times daily   Yes Unknown, Entered By History   atorvastatin (LIPITOR) 40 MG tablet Take 40 mg by mouth At Bedtime   Yes Unknown, Entered By History   cyanocobalamin (VITAMIN B-12) 1000 MCG tablet Take 1,000 mcg by mouth daily   Yes Unknown, Entered By History   ferrous sulfate (FEROSUL) 325 (65 Fe) MG tablet Take 325 mg by mouth three times a week Mon - Wed - Fri. Takes with orange juice.   Yes Unknown, Entered By History   folic acid (FOLVITE) 400 MCG tablet Take 2,000 mcg by mouth daily 400 mcg x 5 tablets for 1 month then on 7/18/21 " decrease to 400 mcg daily   Yes Unknown, Entered By History   gabapentin (NEURONTIN) 300 MG capsule Take 300 mg by mouth At Bedtime   Yes Unknown, Entered By History   latanoprost (XALATAN) 0.005 % ophthalmic solution Place 1 drop into both eyes At Bedtime   Yes Unknown, Entered By History   metoprolol tartrate (LOPRESSOR) 25 MG tablet Take 12.5 mg by mouth 2 times daily   Yes Unknown, Entered By History   mirtazapine (REMERON SOL-TAB) 15 MG ODT 15 mg by Orally disintegrating tablet route At Bedtime   Yes Unknown, Entered By History   omeprazole (PRILOSEC) 20 MG DR capsule Take 20 mg by mouth daily   Yes Unknown, Entered By History   ondansetron (ZOFRAN-ODT) 4 MG ODT tab Take 4 mg by mouth every morning   Yes Unknown, Entered By History   ondansetron (ZOFRAN-ODT) 4 MG ODT tab Take 4 mg by mouth 2 times daily as needed for nausea   Yes Unknown, Entered By History       Scheduled Meds    ampicillin  2 g Intravenous Q6H     atorvastatin  20 mg Oral QPM     cefTRIAXone  2 g Intravenous Q12H     cyanocobalamin  1,000 mcg Oral Daily     folic acid  2,000 mcg Oral Daily     gabapentin  200 mg Oral At Bedtime     gadobutrol  8 mL Intravenous Once     metoprolol tartrate  12.5 mg Oral BID     pantoprazole  40 mg Oral BID AC     sodium chloride 0.9 %  100 mL As instructed Once     sodium chloride (PF)  3 mL Intracatheter Q8H       Infusion Meds    heparin 150 Units/hr (07/03/21 0330)     - MEDICATION INSTRUCTIONS -       Warfarin Therapy Reminder         PRN Meds  acetaminophen, acetaminophen, lidocaine 4%, lidocaine (buffered or not buffered), melatonin, ondansetron **OR** ondansetron, - MEDICATION INSTRUCTIONS -, sodium chloride (PF), Warfarin Therapy Reminder       PHYSICAL EXAMINATION  Temp:  [97.4  F (36.3  C)-97.9  F (36.6  C)] 97.4  F (36.3  C)  Pulse:  [] 84  Resp:  [16-18] 18  BP: (140-166)/(79-95) 140/79  SpO2:  [95 %-97 %] 97 %     General:  patient lying in bed without any acute distress    HEENT:   normocephalic/atraumatic, edentulous  Cardio:  -  Pulmonary:  no respiratory distress  Abdomen:  soft, non-tender, non-distended  Extremities:  no edema, peripheral pulses palpable  Skin:  intact, warm/dry      Neurologic  Mental Status:  Eyes open spontaneously, alert and attentive, oriented to self, time, place and circumstnce and hospital, but not time or circumstance. Spontaneous language is fluent and coherent with intact comprehension of simple commands, naming, and repetition.  Cranial Nerves:  VFF, pupils 2 mm, round, dysconjugate gaze at rest, L eye exotropia, right facial droop, mild dysarthria  Motor:  normal muscle tone and bulk, no abnormal movements, able to move all limbs spontaneously, no drift BUEs, minimal antigravity strength BLEs,   Coordination:  not ataxia of observable movements  Station/Gait:  not tested    Stroke Scales    NIHSS  Interval (daily exam) (06/30/21 1136)   Interval Comments     1a. Level of Consciousness 0-->Alert, keenly responsive   1b. LOC Questions 0-->Answers both questions correctly   1c. LOC Commands 0-->Performs both tasks correctly   2.   Best Gaze 0-->Normal   3.   Visual 0-->No visual loss   4.   Facial Palsy 1-->Minor paralysis (flattened nasolabial fold, asymmetry on smiling)   5a. Motor Arm, Left 0-->No drift, limb holds 90 (or 45) degrees for full 10 secs   5b. Motor Arm, Right 0-->No drift, limb holds 90 (or 45) degrees for full 10 secs   6a. Motor Leg, Left 2-->Some effort against gravity, leg falls to bed by 5 secs, but has some effort against gravity   6b. Motor Leg, right 2-->Some effort against gravity, leg falls to bed by 5 secs, but has some effort against gravity   7.   Limb Ataxia 0-->Absent   8.   Sensory 0-->Normal, no sensory loss   9.   Best Language 0-->No aphasia, normal   10. Dysarthria 0-->Normal   11. Extinction and Inattention  0-->No abnormality   Total 5 (06/30/21 1136)       Imaging  I personally reviewed all imaging; relevant findings per  HPI.     Lab Results Data   CBC  Recent Labs   Lab 07/03/21  0805 07/02/21  0906 07/01/21  0712   WBC 8.8 9.0 8.8   RBC 3.58* 3.61* 3.35*   HGB 9.2* 9.3* 8.6*   HCT 29.9* 30.3* 28.2*    296 261     Basic Metabolic Panel    Recent Labs   Lab 07/03/21  0805 07/02/21  1722 07/02/21 0906 07/01/21  0712 07/01/21 0712     --  147*  --  144   POTASSIUM 3.2* 3.3* 3.3*   < > 3.1*   CHLORIDE 113*  --  117*  --  114*   CO2 25  --  25  --  23   BUN 9  --  10  --  10   CR 0.94  --  1.09*  --  1.25*   GLC 88  --  96  --  96   MANDI 8.4*  --  8.3*  --  8.1*    < > = values in this interval not displayed.     Liver Panel  Recent Labs   Lab 07/02/21  0906 07/01/21  0712 06/30/21  0619 06/28/21  0855 06/28/21  0855   PROTTOTAL 7.2  --   --   --  6.3*   ALBUMIN 1.9* 1.7* 1.5*   < > 1.6*   BILITOTAL 0.3  --   --   --  0.3   ALKPHOS 79  --   --   --  77   AST 13  --   --   --  16   ALT 10  --   --   --  13    < > = values in this interval not displayed.     INR    Recent Labs   Lab Test 07/03/21 0805 07/02/21 0906 07/01/21 0712   INR 2.64* 2.04* 1.53*      Lipid Profile    Recent Labs   Lab Test 06/27/21  0725   CHOL 65   HDL 18*   LDL 31   TRIG 80     A1C    Recent Labs   Lab Test 07/02/21 0906   A1C 5.9*     Troponin I  No results for input(s): TROPI in the last 168 hours.

## 2021-07-03 NOTE — PROGRESS NOTES
[facility]    Infectious Disease Progress Note    Date of Service : 07/03/2021     Assessment :  1.  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 TTE did not show definitive evidence of endocarditis, MARK AN[facility]Y not done  2.  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  3.  History of recurrent urinary infections have included some systemic symptoms intermittently, never any true lower urinary symptoms and her cultures all grew mixed eleni.  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  5  Chronic renal insufficiency.  6.  Atrial fibrillation.     Recommendations :     1.  IV ampicillin + ceftriaxone 2 grams Q12H.  2.  Follow blood cultures (6/28, 6/29 +). 7/1 only 1 set collected which is negative so far. Repeat 2 sets of blood cxs  3.  Should ideally get MARK ANTHONY do look at valve anatomy and to assess for abscess and aortic graft material. Holding off for now per prior discussions  4. Hold off on PICC until blood cxs X 2 sets are for 48 hours       Roxanna Rm MD    Interval History   Sitting out in chair, family at bedside. Feels quite well. Has remained afebrile and is tolerating antibiotics without side effects.    Physical Exam   Temp: 97.4  F (36.3  C) Temp src: Oral BP: (!) 140/79 Pulse: 84   Resp: 18 SpO2: 97 % O2 Device: None (Room air)    Vitals:    06/27/21 0700 06/28/21 0400 06/28/21 1704   Weight: 81.6 kg (179 lb 14.3 oz) 83.6 kg (184 lb 4.9 oz) 80.1 kg (176 lb 9.4 oz)     Vital Signs with Ranges  Temp:  [97.4  F (36.3  C)-97.9  F (36.6  C)] 97.4  F (36.3  C)  Pulse:  [] 84  Resp:  [16-18] 18  BP: (140-166)/(79-95) 140/79  SpO2:  [95 %-97 %] 97 %    Constitutional: Awake, alert, cooperative, no apparent distress  Lungs: lear to auscultation bilaterally  Card, RRR  Abdomen : Soft and non  tender  Skin: No rashes, no cyanosis, no edema    Other:    Medications     heparin 150 Units/hr (07/03/21 0330)     - MEDICATION INSTRUCTIONS -       Warfarin Therapy Reminder         ampicillin  2 g Intravenous Q6H     atorvastatin  20 mg Oral QPM     cefTRIAXone  2 g Intravenous Q24H     cyanocobalamin  1,000 mcg Oral Daily     folic acid  2,000 mcg Oral Daily     gabapentin  200 mg Oral At Bedtime     gadobutrol  8 mL Intravenous Once     metoprolol tartrate  12.5 mg Oral BID     pantoprazole  40 mg Oral BID AC     sodium chloride 0.9 %  100 mL As instructed Once     sodium chloride (PF)  3 mL Intracatheter Q8H       Data   All microbiology laboratory data reviewed.  Recent Labs   Lab Test 07/03/21  0805 07/02/21  0906 07/01/21  0712   WBC 8.8 9.0 8.8   HGB 9.2* 9.3* 8.6*   HCT 29.9* 30.3* 28.2*   MCV 84 84 84    296 261     Recent Labs   Lab Test 07/03/21  0805 07/02/21  0906 07/01/21  0712   CR 0.94 1.09* 1.25*     No lab results found.  Recent Labs   Lab Test 07/01/21  0852 06/30/21  1316 06/28/21  1220   CULT No growth after 2 days Cultured on the 3rd day of incubation:  Gram positive cocci in pairs and chains  *  Critical Value/Significant Value called to and read back by  Gill Bhakta RN @2740 7/03/2021 gd    No growth after 3 days Cultured on the 2nd day of incubation:  Enterococcus faecalis  Susceptibility testing done on previous specimen  *  Critical Value/Significant Value, preliminary result only, called to and read back by  LUIS M MENJIVAR, RN 0937 6.30.21 NDP    Cultured on the 1st day of incubation:  Enterococcus faecalis  *  Critical Value/Significant Value, preliminary result only, called to and read back by  ANTHONY BERRY RN @3624 6.29.21 LM    (Note)  POSITIVE for ENTEROCOCCUS FAECALIS and NEGATIVE for Star/vanB genes  by bunkersofaigene multiplex nucleic acid test. Final identification and  antimicrobial susceptibility testing will be verified by standard  methods.    Specimen  tested with Cleverigene multiplex, gram-positive blood culture  nucleic acid test for the following targets: Staph aureus, Staph  epidermidis, Staph lugdunensis, other Staph species, Enterococcus  faecalis, Enterococcus faecium, Streptococcus species, S. agalactiae,  S. anginosus grp., S. pneumoniae, S. pyogenes, Listeria sp., mecA  (methicillin resistance) and Star/B (vancomycin resistance).    Critical Value/Significant Value called to and read back by  Veena Vogel RN 6/30/21 @ 0045 TF         Attestation:  Total time on the floor involved in the patient's care: 35 minutes. Total time spent in counseling/care coordination: >50%

## 2021-07-03 NOTE — PROGRESS NOTES
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  - No invasive intervention recommended by the Neurology Repeat CT head shows Possible subacute ischemic infarct in the right frontal and left occipital lobe, CTA head shows nonocclusive thrombus at the proxima basilar artery remain stable.  - MRI of the brain shows acute/early subacute infarct at the right joey/medullary junction No other significant past medical history or family history. No hemorrhage.   - Neurology.   - MARK ANTHONY discussed with patient family and cardiology. Family considering risk/benefit. From the neurological and ID perspective, they are desiring MARK ANTHONY to evaluate for endocarditis with direct valvular involvement, and which point there may be less benefit for anticoagulant  -neurology suspect afib rather than endocarditis as culprit, however given high likelilhood of endocarditis risk for bleeding high on anticoagulation. Neurology recommending MARK ANTHONY.   -Neurology recommending risk for intraluminal thrombus in basilar artery and hx afib and potential bleeding risk with  possible endocarditis, risk benefit ratio of anticoagulation considered by team. Given known thrombus neurology recommending continued anticoagulation with heparin gtt. Low threshhold to image brain if change in mental status.   -neurology stable. Had some nausea earlier  R facial droop. Has chronic bilateral foot drop  Updated pt's son    Plan:   -neurolgy and ID following-appreciate in put  -MARK ANTHONY  -abx per ID  -cont heparin gtt, ok to discontinue for up to 8 hours prior to procedure and restarted safely after procedure).   q4 horu neuro checks  -goal sbp <130/80  -cont statin 20mg every day.   - Neurologic monitoring.   - statin.   - Therapies following.   - Heparin gtt  - Hold warfarin for now pending need prior to MARK ANTHONY  - Neurology recommends anticoagulation for now  - no current plans for comfort based approach  -Follow up with Stroke Neurology with repeat CTA (ordered for you) in 1-2 months  -per neurology, Plan to repeat MRI to look for cerebrovascular evidence of continued infarctions as would be expected in active endocarditis. Also repeat CTA head and neck to evaluate partial thrombus in setting of post 6 days of anticoagulation therapy.     Addendum; discussed with neurology. INR 2.6. cont heparin gtt. Daily inr. CT head neck shows interval resolution of previously seen nonocclusive proximal  basilar artery thrombus. They will review MRI brain to ensure no endocarditis like appearing strokes. If present on mri may go down on dosing of heparin gtt.      Permanent atrial fib She is on Metoprolol,   -rate controlled.   - oral Metoprolol   - Neurology managing anticoagulation.      Possible Community acquired Pneumonia vs Aspiration Pneumonia.   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   -TTE - no evidence of endocarditis.   - Ampicillin. Ceftriaxone. Defer to ID.   - ID following.   - Repeat blood culture on 6/29 positive, ID following.   - MARK ANTHONY ordered, however, cardiology requesting GI consult.   - GI consult at Cards request.   -iv ampicillin and ceftriaxone per ID  - follow blood cx: 6/28 +enterooccus 2/2, 6/30 +1/2, 7/1 ngtd, 7/3 ngtd  -MARK ANTHONY Monday  -hold on picc line until bld cx X2 sets are negative for 48 hours.      Acute Renal Failure-resolved   -Patient has recently elevated creatinine in the range of 1.2-1.4, but in may of 2021 and march of 2021 she has normal creatinine.   -Most likely develop ARF during her admission with GI bleeding and likely in ATN at this time. She received IV contrast twice for CTA head and neck and perfusion,.   -Cr down to 0.94. resolved.   - Avoid NSAID's and nephrotoxic medications.      Chronic Anemia, H/O Recent Upper GI bleeding secondary to Angio ectasia   -Patient appears to have chronic anemia with recent acute on chronic anemia secondary to GI bleeding because of  duodenal angioectasia s/p clip placement.   -Hb stable 8-9 range.   - Protonix   -follow Hb     History of stroke prior old Right frontal, and recently left Cerebellar on 4/8/21, and unable to walk related to left leg weakness Wheel chair bound.   - Monitor.      Benign essential hypertension  -slightly hypertensive  -On Metoprolol 12.5 mg bid at home.   - oral metoprolol bid.      S/P AVR & Ascending Aortic Aneurysm repair Stable.  - Monitor.      Bilateral Lower Extremity edema and Weakness. She has 1+ LE edema, most likely chronic, per report. US Venous Bilateral LE negative for DVT  - Monitor.      Hypokalemia, Hypernatremia. Hypernatremia likely because of NS, per report.   - Replace potassium as per protocol.  - Monitor and correct.   -follow bmp,        DVT Prophylaxis: Heparin gtt. Defer to Neuro.      Code Status: No CPR- Do NOT Intubate     Disposition: MARK ANTHONY planned for  Monday for now. Continue heparin                  Emil Trevizo MD  Text Page  (7am to 6pm)  Interval History   Neurologically stable and intact  Some nausea earlier  No cp or sob  No RN concerns    -Data reviewed today: I reviewed all new labs and imaging results over the last 24 hours. I personally reviewed labs last 24 hours.     Physical Exam   Temp: 97.4  F (36.3  C) Temp src: Oral BP: (!) 140/79 Pulse: 84   Resp: 18 SpO2: 97 % O2 Device: None (Room air)    Vitals:    06/27/21 0700 06/28/21 0400 06/28/21 1704   Weight: 81.6 kg (179 lb 14.3 oz) 83.6 kg (184 lb 4.9 oz) 80.1 kg (176 lb 9.4 oz)     Vital Signs with Ranges  Temp:  [97.4  F (36.3  C)-97.9  F (36.6  C)] 97.4  F (36.3  C)  Pulse:  [] 84  Resp:  [16-18] 18  BP: (140-166)/(79-95) 140/79  SpO2:  [95 %-97 %] 97 %  I/O last 3 completed shifts:  In: -   Out: 2600 [Urine:2600]    Constitutional: Up in bed  Neck; nl jvp  Respiratory: CTAB, breathing easilyi   Cardiovascular: irreg irreg rhythm. No r/g/m, crisp S2.   GI: soft, nt, nd  Skin/Integumen: no rash or edema  Neuro: R facial droop, nl speech and mentation. Oriented, strength nl throughout with exception of chronic BLE foot drop. CN 2-12 otherwise intact,   Psych: pleasant,cooperative    Medications     heparin 150 Units/hr (07/03/21 0330)     - MEDICATION INSTRUCTIONS -       Warfarin Therapy Reminder         ampicillin  2 g Intravenous Q6H     atorvastatin  20 mg Oral QPM     cefTRIAXone  2 g Intravenous Q12H     cyanocobalamin  1,000 mcg Oral Daily     folic acid  2,000 mcg Oral Daily     gabapentin  200 mg Oral At Bedtime     gadobutrol  8 mL Intravenous Once     metoprolol tartrate  12.5 mg Oral BID     pantoprazole  40 mg Oral BID AC     sodium chloride 0.9 %  100 mL As instructed Once     sodium chloride (PF)  3 mL Intracatheter Q8H       Data   Recent Labs   Lab 07/03/21  0805 07/02/21  1722 07/02/21  0906 07/01/21  0712 07/01/21  0712 06/28/21  0855 06/28/21  0855   WBC 8.8  --  9.0   --  8.8   < >  --    HGB 9.2*  --  9.3*  --  8.6*   < >  --    MCV 84  --  84  --  84   < >  --      --  296  --  261   < >  --    INR 2.64*  --  2.04*  --  1.53*   < >  --      --  147*  --  144   < > 145*   POTASSIUM 3.2* 3.3* 3.3*   < > 3.1*   < > 3.2*   CHLORIDE 113*  --  117*  --  114*   < > 117*   CO2 25  --  25  --  23   < > 21   BUN 9  --  10  --  10   < > 13   CR 0.94  --  1.09*  --  1.25*   < > 1.36*   ANIONGAP 5  --  5  --  7   < > 7   MANDI 8.4*  --  8.3*  --  8.1*   < > 7.7*   GLC 88  --  96  --  96   < > 118*   ALBUMIN  --   --  1.9*  --  1.7*   < > 1.6*   PROTTOTAL  --   --  7.2  --   --   --  6.3*   BILITOTAL  --   --  0.3  --   --   --  0.3   ALKPHOS  --   --  79  --   --   --  77   ALT  --   --  10  --   --   --  13   AST  --   --  13  --   --   --  16    < > = values in this interval not displayed.       Imaging:   Recent Results (from the past 24 hour(s))   CTA Head Neck with Contrast    Narrative    CTA HEAD AND NECK WITH CONTRAST 7/2/2021 6:14 PM     HISTORY: Stroke/TIA, assess intracranial arteries. Stroke/TIA, assess  extracranial arteries. Brainstem infarct.    TECHNIQUE: Axial images were obtained through the head and neck with  intravenous contrast with 70 mL of Isovue-370 was given. Multiplanar  reconstructions were performed. 3-D reconstructions of remote  workstation for CT angiography were also acquired. Carotid stenoses  were provided by comparing the caliber of the proximal internal  carotid artery to the caliber of the distal internal carotid arteries.  Radiation dose for this scan was reduced using automated exposure  control, adjustment of the mA and/or kV according to patient size, or  iterative reconstruction technique.    COMPARISON: 6/26/2021    FINDINGS:    Brachiocephalic vessels: There is some calcific plaque in the thoracic  arch. Proximal brachiocephalic vessels are widely patent.    Right carotid system: Normal.    Left carotid system: Normal.    Right  vertebral artery: Normal.    Left vertebral artery The distal left vertebral artery is slightly  smaller than the right vertebral artery and does show some mild distal  caliber change which is probably just within normal limits although is  difficult to exclude a subtle recent dissection in this area.    [place]: Small nonocclusive thrombus seen in the proximal  basilar artery on prior exam is no longer evident. The basilar artery  appears to be widely patent. The distal internal carotid arteries are  also patent. The proximal anterior, middle, and posterior cerebral  arteries are patent.    Other findings: Degenerative changes are seen in the spine.  Moderate-sized pleural effusions are seen in the lungs.      Impression    IMPRESSION:  1. Interval resolution of previously seen nonocclusive proximal  basilar artery thrombus.  2. Mild caliber change in distal nondominant left vertebral artery  which is probably within normal limits although is difficult to  exclude a subtle recent dissection this area.  3. Moderate-sized bilateral pleural effusions.    YONY CRENSHAW MD          SYSTEM ID:  [id number]

## 2021-07-04 LAB
ANION GAP SERPL CALCULATED.3IONS-SCNC: 4 MMOL/L (ref 3–14)
BUN SERPL-MCNC: 7 MG/DL (ref 7–30)
CALCIUM SERPL-MCNC: 8.3 MG/DL (ref 8.5–10.1)
CHLORIDE SERPL-SCNC: 111 MMOL/L (ref 94–109)
CO2 SERPL-SCNC: 25 MMOL/L (ref 20–32)
CREAT SERPL-MCNC: 0.97 MG/DL (ref 0.52–1.04)
ERYTHROCYTE [DISTWIDTH] IN BLOOD BY AUTOMATED COUNT: 17.2 % (ref 10–15)
GFR SERPL CREATININE-BSD FRML MDRD: 52 ML/MIN/{1.73_M2}
GLUCOSE SERPL-MCNC: 93 MG/DL (ref 70–99)
HCT VFR BLD AUTO: 30.8 % (ref 35–47)
HGB BLD-MCNC: 9.2 G/DL (ref 11.7–15.7)
INR PPP: 2.81 (ref 0.86–1.14)
MAGNESIUM SERPL-MCNC: 1.7 MG/DL (ref 1.6–2.3)
MCH RBC QN AUTO: 25.4 PG (ref 26.5–33)
MCHC RBC AUTO-ENTMCNC: 29.9 G/DL (ref 31.5–36.5)
MCV RBC AUTO: 85 FL (ref 78–100)
PHOSPHATE SERPL-MCNC: 2.8 MG/DL (ref 2.5–4.5)
PLATELET # BLD AUTO: 304 10E9/L (ref 150–450)
POTASSIUM SERPL-SCNC: 3.4 MMOL/L (ref 3.4–5.3)
POTASSIUM SERPL-SCNC: 3.5 MMOL/L (ref 3.4–5.3)
RBC # BLD AUTO: 3.62 10E12/L (ref 3.8–5.2)
SODIUM SERPL-SCNC: 140 MMOL/L (ref 133–144)
UFH PPP CHRO-ACNC: 0.11 IU/ML
UFH PPP CHRO-ACNC: 0.12 IU/ML
WBC # BLD AUTO: 7.4 10E9/L (ref 4–11)

## 2021-07-04 PROCEDURE — 250N000013 HC RX MED GY IP 250 OP 250 PS 637: Performed by: INTERNAL MEDICINE

## 2021-07-04 PROCEDURE — 83735 ASSAY OF MAGNESIUM: CPT | Performed by: INTERNAL MEDICINE

## 2021-07-04 PROCEDURE — 250N000011 HC RX IP 250 OP 636: Performed by: HOSPITALIST

## 2021-07-04 PROCEDURE — 85027 COMPLETE CBC AUTOMATED: CPT | Performed by: STUDENT IN AN ORGANIZED HEALTH CARE EDUCATION/TRAINING PROGRAM

## 2021-07-04 PROCEDURE — 80048 BASIC METABOLIC PNL TOTAL CA: CPT | Performed by: INTERNAL MEDICINE

## 2021-07-04 PROCEDURE — 85520 HEPARIN ASSAY: CPT | Performed by: INTERNAL MEDICINE

## 2021-07-04 PROCEDURE — 84132 ASSAY OF SERUM POTASSIUM: CPT | Performed by: INTERNAL MEDICINE

## 2021-07-04 PROCEDURE — 84100 ASSAY OF PHOSPHORUS: CPT | Performed by: INTERNAL MEDICINE

## 2021-07-04 PROCEDURE — 99232 SBSQ HOSP IP/OBS MODERATE 35: CPT | Mod: GC | Performed by: PSYCHIATRY & NEUROLOGY

## 2021-07-04 PROCEDURE — 36415 COLL VENOUS BLD VENIPUNCTURE: CPT | Performed by: INTERNAL MEDICINE

## 2021-07-04 PROCEDURE — 250N000011 HC RX IP 250 OP 636: Performed by: SPECIALIST

## 2021-07-04 PROCEDURE — 250N000011 HC RX IP 250 OP 636: Performed by: INTERNAL MEDICINE

## 2021-07-04 PROCEDURE — 99233 SBSQ HOSP IP/OBS HIGH 50: CPT | Performed by: INTERNAL MEDICINE

## 2021-07-04 PROCEDURE — 85520 HEPARIN ASSAY: CPT | Performed by: STUDENT IN AN ORGANIZED HEALTH CARE EDUCATION/TRAINING PROGRAM

## 2021-07-04 PROCEDURE — 36415 COLL VENOUS BLD VENIPUNCTURE: CPT | Performed by: STUDENT IN AN ORGANIZED HEALTH CARE EDUCATION/TRAINING PROGRAM

## 2021-07-04 PROCEDURE — 85610 PROTHROMBIN TIME: CPT | Performed by: INTERNAL MEDICINE

## 2021-07-04 PROCEDURE — 120N000001 HC R&B MED SURG/OB

## 2021-07-04 RX ORDER — POTASSIUM CHLORIDE 1500 MG/1
20 TABLET, EXTENDED RELEASE ORAL ONCE
Status: COMPLETED | OUTPATIENT
Start: 2021-07-04 | End: 2021-07-04

## 2021-07-04 RX ADMIN — METOPROLOL TARTRATE 25 MG: 25 TABLET, FILM COATED ORAL at 09:28

## 2021-07-04 RX ADMIN — CEFTRIAXONE SODIUM 2 G: 2 INJECTION, POWDER, FOR SOLUTION INTRAMUSCULAR; INTRAVENOUS at 02:00

## 2021-07-04 RX ADMIN — FOLIC ACID 2000 MCG: 1 TABLET ORAL at 09:28

## 2021-07-04 RX ADMIN — AMPICILLIN SODIUM 2 G: 2 INJECTION, POWDER, FOR SOLUTION INTRAVENOUS at 06:04

## 2021-07-04 RX ADMIN — AMPICILLIN SODIUM 2 G: 2 INJECTION, POWDER, FOR SOLUTION INTRAVENOUS at 00:52

## 2021-07-04 RX ADMIN — AMPICILLIN SODIUM 2 G: 2 INJECTION, POWDER, FOR SOLUTION INTRAVENOUS at 17:13

## 2021-07-04 RX ADMIN — POTASSIUM CHLORIDE 20 MEQ: 1500 TABLET, EXTENDED RELEASE ORAL at 11:33

## 2021-07-04 RX ADMIN — PANTOPRAZOLE SODIUM 40 MG: 40 TABLET, DELAYED RELEASE ORAL at 15:33

## 2021-07-04 RX ADMIN — PANTOPRAZOLE SODIUM 40 MG: 40 TABLET, DELAYED RELEASE ORAL at 09:28

## 2021-07-04 RX ADMIN — CYANOCOBALAMIN TAB 1000 MCG 1000 MCG: 1000 TAB at 09:28

## 2021-07-04 RX ADMIN — ATORVASTATIN CALCIUM 20 MG: 20 TABLET, FILM COATED ORAL at 21:02

## 2021-07-04 RX ADMIN — GABAPENTIN 200 MG: 100 CAPSULE ORAL at 22:43

## 2021-07-04 RX ADMIN — AMPICILLIN SODIUM 2 G: 2 INJECTION, POWDER, FOR SOLUTION INTRAVENOUS at 11:33

## 2021-07-04 RX ADMIN — METOPROLOL TARTRATE 25 MG: 25 TABLET, FILM COATED ORAL at 21:02

## 2021-07-04 RX ADMIN — CEFTRIAXONE SODIUM 2 G: 2 INJECTION, POWDER, FOR SOLUTION INTRAMUSCULAR; INTRAVENOUS at 12:16

## 2021-07-04 ASSESSMENT — ACTIVITIES OF DAILY LIVING (ADL)
ADLS_ACUITY_SCORE: 20
ADLS_ACUITY_SCORE: 26
ADLS_ACUITY_SCORE: 26
ADLS_ACUITY_SCORE: 20
ADLS_ACUITY_SCORE: 26
ADLS_ACUITY_SCORE: 20

## 2021-07-04 NOTE — PROGRESS NOTES
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  - No invasive intervention recommended by the Neurology Repeat CT head shows Possible subacute ischemic infarct in the right frontal and left occipital lobe, CTA head shows nonocclusive thrombus at the proxima basilar artery remain stable.  - MRI of the brain shows acute/early subacute infarct at the right joey/medullary junction No other significant past medical history or family history. No hemorrhage.   - Neurology.   - MARK ANTHONY discussed with patient family and cardiology. Family considering risk/benefit. From the neurological and ID perspective, they are desiring MARK ANTHONY to evaluate for endocarditis with direct valvular involvement, and which point there may be less benefit for anticoagulant  -neurology suspect afib rather than endocarditis as culprit, however given high likelilhood of endocarditis risk for bleeding high on anticoagulation. Neurology recommending MARK ANTHONY.   -Neurology recommending risk for intraluminal thrombus in basilar artery and hx afib and potential bleeding risk with  possible endocarditis, risk benefit ratio of anticoagulation considered by team. Given known thrombus neurology recommending continued anticoagulation with heparin gtt. Low threshhold to image brain if change in mental status.   -neurology stable. Had some nausea earlier  R facial droop. Has chronic bilateral foot drop  Updated pt's son 7.4  -7/3: routine repeat CTA showed resolution of thrombus  Repeat MRI Brain showed expected evolution of prior stroke, hold left occipital, right frontal and L pontine infarcts    -sbp seems improved with increase in metoprolol to 25mg bid.   -doing well. Stable neurologically, facial droop nearly resolved.     Plan:   -  -MARK ANTHONY in am, npo midnight per discussion with family and neurology,   -neurolgy and ID following-appreciate in put  -abx per ID-see note  -cont heparin gtt, ok to discontinue for up to 8 hours prior to procedure and restarted safely after procedure).   -hold heparin gtt if INR >2. Ok with neurology  q4 horu neuro checks  -goal sbp <130/80  -cont statin 20mg every day.   - Neurologic monitoring.   - statin.   - Therapies following.   - Heparin gtt  - Hold warfarin for now pending need prior to MARK ANTHONY  - Neurology recommends anticoagulation for now  - no current plans for comfort based approach  -Follow up with Stroke Neurology with repeat CTA (ordered for you) in 1-2 months  -per neurology, Plan to repeat MRI to look for cerebrovascular evidence of continued infarctions as would be expected in active endocarditis. Also repeat CTA head and neck to evaluate partial thrombus in setting of post 6 days of anticoagulation therapy.     -INR 2.8 today. Hold heparin, ok with stroke neurology - see note         Permanent atrial fib She is on Metoprolol,   -rate controlled.   - oral Metoprolol - increased dose to 25mg bid to improve htn control  - Neurology managing anticoagulation.      Possible Community acquired Pneumonia vs Aspiration Pneumonia.   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   -TTE - no evidence of endocarditis.   - Ampicillin. Ceftriaxone. Defer to ID.   - ID following.   - Repeat blood culture on 6/29 positive, ID following.   - MARK ANTHONY ordered, however, cardiology requesting GI consult.   - GI consult at Cards request.   -iv ampicillin and ceftriaxone per ID  - follow blood cx: 6/28 +enterooccus 2/2, 6/30 +1/2, 7/1 ngtd, 7/3 ngtd  -MARK ANTHONY Monday  - PICC tomorrow in anticipation of long term IV antibiotics for 6 weeks until 8/12/2021     Acute Renal Failure-resolved   -Patient has recently elevated creatinine in the range of 1.2-1.4, but in may of 2021 and march of 2021 she has normal creatinine.   -Most likely develop ARF during her admission with GI bleeding and likely in ATN at this time. She received IV contrast twice for CTA head and neck and perfusion,.   -Cr down to 0.94. resolved.   - Avoid NSAID's and nephrotoxic medications.      Chronic Anemia, H/O Recent Upper GI bleeding secondary to Angio ectasia   -Patient appears to have chronic anemia with recent acute on chronic anemia secondary to GI bleeding because of  duodenal angioectasia s/p clip placement.   -Hb stable 8-9 range.   - Protonix   -follow Hb     History of stroke prior old Right frontal, and recently left Cerebellar on 4/8/21, and unable to walk related to left leg weakness Wheel chair bound.   - Monitor.      Benign essential hypertension  -slightly hypertensive  -On Metoprolol 12.5 mg bid at home.   Increased to 25mg bid  - may add norvasc 5mg every day.        S/P AVR & Ascending Aortic Aneurysm repair Stable.  - Monitor.      Bilateral Lower Extremity edema and Weakness. She has 1+ LE edema, most likely chronic, per report. US Venous Bilateral LE negative for DVT  -  Monitor.      Hypokalemia, Hypernatremia. Hypernatremia likely because of NS, per report.   - Replace potassium as per protocol.  - Monitor and correct.   -follow bmp,        DVT Prophylaxis: Heparin gtt. Defer to Neuro.      Code Status: No CPR- Do NOT Intubate     Disposition: MARK ANTHONY planned for Monday for now. Continue heparin                      Emil Trevizo MD  Text Page  (7am to 6pm)  Interval History   Seen by neuro and ID today  No RN concerns  No cp or sob.   No neuro changes.       -Data reviewed today: I reviewed all new labs and imaging results over the last 24 hours. I personally reviewed labs last 24 hours.     Physical Exam   Temp: 97.8  F (36.6  C) Temp src: Oral BP: (!) 142/80 Pulse: 72   Resp: 17 SpO2: 96 % O2 Device: None (Room air)    Vitals:    06/27/21 0700 06/28/21 0400 06/28/21 1704   Weight: 81.6 kg (179 lb 14.3 oz) 83.6 kg (184 lb 4.9 oz) 80.1 kg (176 lb 9.4 oz)     Vital Signs with Ranges  Temp:  [97.6  F (36.4  C)-98.1  F (36.7  C)] 97.8  F (36.6  C)  Pulse:  [70-98] 72  Resp:  [16-17] 17  BP: (117-148)/(66-84) 142/80  SpO2:  [94 %-97 %] 96 %  I/O last 3 completed shifts:  In: 240 [P.O.:240]  Out: 1500 [Urine:1500]    Constitutional: Up in bed  Neck; nl jvp  Respiratory: CTAB, breathing easilyi   Cardiovascular: irreg irreg rhythm. No r/g/m, crisp S2.   GI: soft, nt, nd  Skin/Integumen: no rash or edema  Neuro: R facial droop-subtle, nl speech and mentation. Oriented fully, strength nl throughout with exception of chronic BLE foot drop. CN 2-12 otherwise intact,   Psych: pleasant,cooperative    Medications     heparin 300 Units/hr (07/04/21 0950)     - MEDICATION INSTRUCTIONS -         ampicillin  2 g Intravenous Q6H     atorvastatin  20 mg Oral QPM     cefTRIAXone  2 g Intravenous Q12H     cyanocobalamin  1,000 mcg Oral Daily     folic acid  2,000 mcg Oral Daily     gabapentin  200 mg Oral At Bedtime     gadobutrol  8 mL Intravenous Once     metoprolol tartrate  25 mg Oral BID      pantoprazole  40 mg Oral BID AC     sodium chloride 0.9 %  100 mL As instructed Once     sodium chloride (PF)  3 mL Intracatheter Q8H       Data   Recent Labs   Lab 07/04/21  0859 07/03/21  2248 07/03/21  1645 07/03/21  0805 07/02/21  0906 07/02/21  0906 07/01/21  0712 07/01/21  0712 06/28/21  0855 06/28/21  0855   WBC 7.4  --   --  8.8  --  9.0  --  8.8   < >  --    HGB 9.2*  --   --  9.2*  --  9.3*  --  8.6*   < >  --    MCV 85  --   --  84  --  84  --  84   < >  --      --   --  322  --  296  --  261   < >  --    INR  --   --   --  2.64*  --  2.04*  --  1.53*   < >  --      --   --  143  --  147*  --  144   < > 145*   POTASSIUM 3.4 3.9 3.3* 3.2*   < > 3.3*   < > 3.1*   < > 3.2*   CHLORIDE 111*  --   --  113*  --  117*  --  114*   < > 117*   CO2 25  --   --  25  --  25  --  23   < > 21   BUN 7  --   --  9  --  10  --  10   < > 13   CR 0.97  --   --  0.94  --  1.09*  --  1.25*   < > 1.36*   ANIONGAP 4  --   --  5  --  5  --  7   < > 7   MANDI 8.3*  --   --  8.4*  --  8.3*  --  8.1*   < > 7.7*   GLC 93  --   --  88  --  96  --  96   < > 118*   ALBUMIN  --   --   --   --   --  1.9*  --  1.7*   < > 1.6*   PROTTOTAL  --   --   --   --   --  7.2  --   --   --  6.3*   BILITOTAL  --   --   --   --   --  0.3  --   --   --  0.3   ALKPHOS  --   --   --   --   --  79  --   --   --  77   ALT  --   --   --   --   --  10  --   --   --  13   AST  --   --   --   --   --  13  --   --   --  16    < > = values in this interval not displayed.       Imaging:   No results found for this or any previous visit (from the past 24 hour(s)).

## 2021-07-04 NOTE — PLAN OF CARE
"Neuro: Aox4, calm cooperative, R facial droop  Cardiac: Tele A-fib  Resp: on RA   GI: Bowel sounds active, no BM during shift  : Voiding adequate amounts in purewick/incontinent  CMS: Intact  Mobility: Up with rick steady  Pain: Denies  Diet: Dd2 thin liquids  Plan: for MARK ANTHONY Monday  Other important info: heparin infusing at 1.5ml/hr    /66 (BP Location: Right arm)   Pulse 70   Temp 98  F (36.7  C) (Oral)   Resp 16   Ht 1.549 m (5' 1\")   Wt 80.1 kg (176 lb 9.4 oz)   SpO2 94%   BMI 33.37 kg/m       "

## 2021-07-04 NOTE — PROGRESS NOTES
[facility]    Stroke Progress Note    Interval Events  Patient without complaints or headache today    Impression  1. 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 Size and location of infarct (pontomedullary) is more likely to be secondary to be secondary to Afib rather than endocarditis; however, due to high likelihood of active endocarditis risk of bleeding on anticoagulation is high. Plan to repeat MRI to look for cerebrovascular evidence of continued infarctions as would be expected in active endocarditis. Also repeat CTA head and neck to evaluate partial thrombus in setting of post 6 days of anticoagulation therapy.     > Due to concurrent risk factors for stroke--intraluminal thrombus in the basilar artery and history of afib--and bleeding risk--infective endocarditis--careful consideration of risks and benefits need to be considered regarding anticoagulation therapy. Due to known thrombus, would continue anticoagulation therapy with heparin gtt with low threshold to rescan patient with any changes in exam or mental status.   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, right frontal, left pontine infarcts  Cervical Carotid and Vertebral Artery Vascular Imaging: no stenoses  Echocardiogram: LVEF 55-60%, severe LA enlargement, moderate tricuspid regurg  EKG/Telemetry: a fib  LDL: 31  A1c: 5.8  Troponin: not obtained  Other testing: Not Applicable    Plan  - Transesophageal echocardiogram ([facility])   - Defer antibiotic therapy to  "Infectious disease and primary team  - Continue q4 hour neurochecks while inpatient  - Continue therapeutic heparin gtt (if GI/Cardiology would prefer, ok to discontinue for up to 8 hours prior to procedure and restarted safely after procedure).    -Due to recent use of warfarin okay to hold heparin as long as INR greater than 2   -If INR becomes less than 2 resume heparin GTT  - Low threshold for CT Head with any changes to exam  - Goal normotension, <130/80 or lower if tolerated for overall reduced cardiovascular risk. Please titrate enteral meds to achieve. IV PRN BP meds only for SBP >180.  - Continue decreased dose atorvastatin 20 mg daily in light of LDL 31 and increased risk of ICH in LDL<40  - Follow up with Stroke Neurology with repeat CTA (ordered for you) in 1-2 months    Vascular Neurology will continue to follow. Please do not hesitate to contact us with questions or concerns, should they arise.    The Stroke Staff is Dr. Meade.    Guilherme Croft MD [facility]  Vascular Neurology Fellow  To page me or covering stroke neurology team member, click here: [facility]   Choose \"On Call\" tab at top, then search dropdown box for \"Neurology Adult\", select location, press Enter, then look for stroke/neuro ICU/telestroke.    ______________________________________________________    Medications   Home Meds  Prior to Admission medications    Medication Sig Start Date End Date Taking? Authorizing Provider   acetaminophen (TYLENOL) 325 MG tablet Take 650 mg by mouth every 8 hours as needed for mild pain   Yes Unknown, Entered By History   apixaban ANTICOAGULANT (ELIQUIS) 5 MG tablet Take 5 mg by mouth 2 times daily   Yes Unknown, Entered By History   atorvastatin (LIPITOR) 40 MG tablet Take 40 mg by mouth At Bedtime   Yes Unknown, Entered By History   cyanocobalamin (VITAMIN B-12) 1000 MCG tablet Take 1,000 mcg by mouth daily   Yes Unknown, Entered By History   ferrous sulfate (FEROSUL) 325 (65 Fe) MG tablet Take 325 mg by " mouth three times a week Mon - Wed - Fri. Takes with orange juice.   Yes Unknown, Entered By History   folic acid (FOLVITE) 400 MCG tablet Take 2,000 mcg by mouth daily 400 mcg x 5 tablets for 1 month then on 7/18/21 decrease to 400 mcg daily   Yes Unknown, Entered By History   gabapentin (NEURONTIN) 300 MG capsule Take 300 mg by mouth At Bedtime   Yes Unknown, Entered By History   latanoprost (XALATAN) 0.005 % ophthalmic solution Place 1 drop into both eyes At Bedtime   Yes Unknown, Entered By History   metoprolol tartrate (LOPRESSOR) 25 MG tablet Take 12.5 mg by mouth 2 times daily   Yes Unknown, Entered By History   mirtazapine (REMERON SOL-TAB) 15 MG ODT 15 mg by Orally disintegrating tablet route At Bedtime   Yes Unknown, Entered By History   omeprazole (PRILOSEC) 20 MG DR capsule Take 20 mg by mouth daily   Yes Unknown, Entered By History   ondansetron (ZOFRAN-ODT) 4 MG ODT tab Take 4 mg by mouth every morning   Yes Unknown, Entered By History   ondansetron (ZOFRAN-ODT) 4 MG ODT tab Take 4 mg by mouth 2 times daily as needed for nausea   Yes Unknown, Entered By History       Scheduled Meds    ampicillin  2 g Intravenous Q6H     atorvastatin  20 mg Oral QPM     cefTRIAXone  2 g Intravenous Q12H     cyanocobalamin  1,000 mcg Oral Daily     folic acid  2,000 mcg Oral Daily     gabapentin  200 mg Oral At Bedtime     gadobutrol  8 mL Intravenous Once     metoprolol tartrate  25 mg Oral BID     pantoprazole  40 mg Oral BID AC     sodium chloride 0.9 %  100 mL As instructed Once     sodium chloride (PF)  3 mL Intracatheter Q8H       Infusion Meds    heparin 300 Units/hr (07/04/21 0950)     - MEDICATION INSTRUCTIONS -         PRN Meds  acetaminophen, acetaminophen, lidocaine 4%, lidocaine (buffered or not buffered), melatonin, ondansetron **OR** ondansetron, - MEDICATION INSTRUCTIONS -, sodium chloride (PF)       PHYSICAL EXAMINATION  Temp:  [97.6  F (36.4  C)-98.1  F (36.7  C)] 97.8  F (36.6  C)  Pulse:  [70-98]  72  Resp:  [16-17] 17  BP: (117-148)/(66-84) 142/80  SpO2:  [94 %-97 %] 96 %     General:  patient lying in bed without any acute distress    HEENT:  normocephalic/atraumatic, edentulous  Cardio:  -  Pulmonary:  no respiratory distress  Abdomen:  soft, non-distended  Extremities:  no edema  Skin:  intact     Neurologic  Mental Status:  Eyes open spontaneously, alert and attentive, oriented to self, time, place and circumstnce and hospital, but not time or circumstance. Spontaneous language is fluent and coherent with intact comprehension of simple commands, naming, and repetition.  Cranial Nerves:  VFF, pupils 3 mm, round, dysconjugate gaze at rest, L eye exotropia, right facial droop - mild, mild dysarthria  Motor:  normal muscle tone and bulk, no abnormal movements, able to move all limbs spontaneously, no drift BUEs, minimal antigravity strength BLEs,   Coordination:  not ataxia of observable movements  Station/Gait:  not tested    Stroke Scales    NIHSS  Interval (daily exam) (06/30/21 1136)   Interval Comments     1a. Level of Consciousness 0-->Alert, keenly responsive   1b. LOC Questions 0-->Answers both questions correctly   1c. LOC Commands 0-->Performs both tasks correctly   2.   Best Gaze 0-->Normal   3.   Visual 0-->No visual loss   4.   Facial Palsy 1-->Minor paralysis (flattened nasolabial fold, asymmetry on smiling)   5a. Motor Arm, Left 0-->No drift, limb holds 90 (or 45) degrees for full 10 secs   5b. Motor Arm, Right 0-->No drift, limb holds 90 (or 45) degrees for full 10 secs   6a. Motor Leg, Left 2-->Some effort against gravity, leg falls to bed by 5 secs, but has some effort against gravity   6b. Motor Leg, right 2-->Some effort against gravity, leg falls to bed by 5 secs, but has some effort against gravity   7.   Limb Ataxia 0-->Absent   8.   Sensory 0-->Normal, no sensory loss   9.   Best Language 0-->No aphasia, normal   10. Dysarthria 0-->Normal   11. Extinction and Inattention  0-->No  abnormality   Total 5 (06/30/21 1136)       Imaging  I personally reviewed all imaging; relevant findings per HPI.     Lab Results Data   CBC  Recent Labs   Lab 07/04/21  0859 07/03/21  0805 07/02/21  0906   WBC 7.4 8.8 9.0   RBC 3.62* 3.58* 3.61*   HGB 9.2* 9.2* 9.3*   HCT 30.8* 29.9* 30.3*    322 296     Basic Metabolic Panel    Recent Labs   Lab 07/04/21  0859 07/03/21  2248 07/03/21  1645 07/03/21  0805 07/02/21  0906 07/02/21  0906     --   --  143  --  147*   POTASSIUM 3.4 3.9 3.3* 3.2*   < > 3.3*   CHLORIDE 111*  --   --  113*  --  117*   CO2 25  --   --  25  --  25   BUN 7  --   --  9  --  10   CR 0.97  --   --  0.94  --  1.09*   GLC 93  --   --  88  --  96   MANDI 8.3*  --   --  8.4*  --  8.3*    < > = values in this interval not displayed.     Liver Panel  Recent Labs   Lab 07/02/21  0906 07/01/21  0712 06/30/21  0619 06/28/21  0855 06/28/21  0855   PROTTOTAL 7.2  --   --   --  6.3*   ALBUMIN 1.9* 1.7* 1.5*   < > 1.6*   BILITOTAL 0.3  --   --   --  0.3   ALKPHOS 79  --   --   --  77   AST 13  --   --   --  16   ALT 10  --   --   --  13    < > = values in this interval not displayed.     INR    Recent Labs   Lab Test 07/03/21  0805 07/02/21  0906 07/01/21 0712   INR 2.64* 2.04* 1.53*      Lipid Profile    Recent Labs   Lab Test 06/27/21  0725   CHOL 65   HDL 18*   LDL 31   TRIG 80     A1C    Recent Labs   Lab Test 07/02/21 0906   A1C 5.9*     Troponin I  No results for input(s): TROPI in the last 168 hours.

## 2021-07-04 NOTE — PROGRESS NOTES
Pt here with acute pontomedullary infarct. A&OX2. Neuros intact except right droop. VSS. Tele a-fib CVR. On NDD II diet with thin liquids. Takes pills whole with any liquids. Up with a lift. Denies pain. Recheck K 3.3 @ 1700. Replaced again. Recheck ordered for 2300. Pt scoring green on the Aggression Stop Light Tool. Plan is [facility] on Monday. Discharge pending.

## 2021-07-04 NOTE — PLAN OF CARE
[id number]: Pt here with acute CVA. A&Ox3, disoriented to time, forgetful at times. Neuros show slight R facial droop, generalized weakness, bilateral foot drop (baseline). VSS on room air, except some HTN. Tele afib CVR. Tolerating DD2 diet, thin liquids. Takes pills whole with water. Up with ax2, rick steady, turning patient in bed. Denies pain. Pt scoring green on the Aggression Stop Light Tool. Incontinent of urine, purwik in place, no BM this shift. Plan for [facility] tomorrow, NPO at 0000. Discharge plan pending.

## 2021-07-04 NOTE — PROGRESS NOTES
SPIRITUAL HEALTH SERVICES Progress Note  [facility]    Referral Source: Request for Health Care Directive    PT indicates that she has completed document through her primary care physician. No document available in medical record.    Follow-up with [facility] to make HCD available in our system.      Deon Castaneda  Chaplain Resident

## 2021-07-05 ENCOUNTER — APPOINTMENT (OUTPATIENT)
Dept: SPEECH THERAPY | Facility: CLINIC | Age: 86
DRG: 288 | End: 2021-07-05
Attending: HOSPITALIST
Payer: MEDICARE

## 2021-07-05 ENCOUNTER — APPOINTMENT (OUTPATIENT)
Dept: CARDIOLOGY | Facility: CLINIC | Age: 86
DRG: 288 | End: 2021-07-05
Attending: HOSPITALIST
Payer: MEDICARE

## 2021-07-05 ENCOUNTER — APPOINTMENT (OUTPATIENT)
Dept: PHYSICAL THERAPY | Facility: CLINIC | Age: 86
DRG: 288 | End: 2021-07-05
Attending: HOSPITALIST
Payer: MEDICARE

## 2021-07-05 ENCOUNTER — AMBULATORY - HEALTHEAST (OUTPATIENT)
Dept: OTHER | Facility: CLINIC | Age: 86
End: 2021-07-05

## 2021-07-05 ENCOUNTER — DOCUMENTATION ONLY (OUTPATIENT)
Dept: OTHER | Facility: CLINIC | Age: 86
End: 2021-07-05

## 2021-07-05 ENCOUNTER — APPOINTMENT (OUTPATIENT)
Dept: GENERAL RADIOLOGY | Facility: CLINIC | Age: 86
DRG: 288 | End: 2021-07-05
Attending: SPECIALIST
Payer: MEDICARE

## 2021-07-05 LAB
ANION GAP SERPL CALCULATED.3IONS-SCNC: 6 MMOL/L (ref 3–14)
BACTERIA SPEC CULT: ABNORMAL
BACTERIA SPEC CULT: ABNORMAL
BUN SERPL-MCNC: 7 MG/DL (ref 7–30)
CALCIUM SERPL-MCNC: 8.3 MG/DL (ref 8.5–10.1)
CHLORIDE SERPL-SCNC: 110 MMOL/L (ref 94–109)
CO2 SERPL-SCNC: 27 MMOL/L (ref 20–32)
CREAT SERPL-MCNC: 0.91 MG/DL (ref 0.52–1.04)
GFR SERPL CREATININE-BSD FRML MDRD: 56 ML/MIN/{1.73_M2}
GLUCOSE BLDC GLUCOMTR-MCNC: 84 MG/DL (ref 70–99)
GLUCOSE BLDC GLUCOMTR-MCNC: 93 MG/DL (ref 70–99)
GLUCOSE SERPL-MCNC: 108 MG/DL (ref 70–99)
INR PPP: 2.18 (ref 0.86–1.14)
POTASSIUM SERPL-SCNC: 3.1 MMOL/L (ref 3.4–5.3)
POTASSIUM SERPL-SCNC: 3.1 MMOL/L (ref 3.4–5.3)
SODIUM SERPL-SCNC: 143 MMOL/L (ref 133–144)
SPECIMEN SOURCE: ABNORMAL

## 2021-07-05 PROCEDURE — 97116 GAIT TRAINING THERAPY: CPT | Mod: GP

## 2021-07-05 PROCEDURE — 02HV33Z INSERTION OF INFUSION DEVICE INTO SUPERIOR VENA CAVA, PERCUTANEOUS APPROACH: ICD-10-PCS | Performed by: SPECIALIST

## 2021-07-05 PROCEDURE — 93325 DOPPLER ECHO COLOR FLOW MAPG: CPT | Mod: 26 | Performed by: INTERNAL MEDICINE

## 2021-07-05 PROCEDURE — 250N000011 HC RX IP 250 OP 636: Performed by: INTERNAL MEDICINE

## 2021-07-05 PROCEDURE — 999N000183 HC STATISTIC TEE INCLUDES SEDATION

## 2021-07-05 PROCEDURE — 97530 THERAPEUTIC ACTIVITIES: CPT | Mod: GP

## 2021-07-05 PROCEDURE — 120N000001 HC R&B MED SURG/OB

## 2021-07-05 PROCEDURE — 84132 ASSAY OF SERUM POTASSIUM: CPT | Performed by: INTERNAL MEDICINE

## 2021-07-05 PROCEDURE — 999N001017 HC STATISTIC GLUCOSE BY METER IP

## 2021-07-05 PROCEDURE — 99233 SBSQ HOSP IP/OBS HIGH 50: CPT | Performed by: INTERNAL MEDICINE

## 2021-07-05 PROCEDURE — 99152 MOD SED SAME PHYS/QHP 5/>YRS: CPT | Performed by: INTERNAL MEDICINE

## 2021-07-05 PROCEDURE — 258N000003 HC RX IP 258 OP 636: Performed by: INTERNAL MEDICINE

## 2021-07-05 PROCEDURE — 999N000184 HC STATISTIC TELEMETRY

## 2021-07-05 PROCEDURE — 93312 ECHO TRANSESOPHAGEAL: CPT | Mod: 26 | Performed by: INTERNAL MEDICINE

## 2021-07-05 PROCEDURE — 250N000009 HC RX 250: Performed by: SPECIALIST

## 2021-07-05 PROCEDURE — 36569 INSJ PICC 5 YR+ W/O IMAGING: CPT

## 2021-07-05 PROCEDURE — 85610 PROTHROMBIN TIME: CPT | Performed by: INTERNAL MEDICINE

## 2021-07-05 PROCEDURE — 250N000013 HC RX MED GY IP 250 OP 250 PS 637: Performed by: INTERNAL MEDICINE

## 2021-07-05 PROCEDURE — 272N000450 HC KIT 4FR POWER PICC SINGLE LUMEN

## 2021-07-05 PROCEDURE — 93325 DOPPLER ECHO COLOR FLOW MAPG: CPT

## 2021-07-05 PROCEDURE — 93320 DOPPLER ECHO COMPLETE: CPT

## 2021-07-05 PROCEDURE — 99232 SBSQ HOSP IP/OBS MODERATE 35: CPT | Mod: GC | Performed by: PSYCHIATRY & NEUROLOGY

## 2021-07-05 PROCEDURE — 80048 BASIC METABOLIC PNL TOTAL CA: CPT | Performed by: INTERNAL MEDICINE

## 2021-07-05 PROCEDURE — 36415 COLL VENOUS BLD VENIPUNCTURE: CPT | Performed by: INTERNAL MEDICINE

## 2021-07-05 PROCEDURE — 250N000011 HC RX IP 250 OP 636: Performed by: SPECIALIST

## 2021-07-05 PROCEDURE — 93320 DOPPLER ECHO COMPLETE: CPT | Mod: 26 | Performed by: INTERNAL MEDICINE

## 2021-07-05 PROCEDURE — 92526 ORAL FUNCTION THERAPY: CPT | Mod: GN

## 2021-07-05 PROCEDURE — 250N000009 HC RX 250: Performed by: INTERNAL MEDICINE

## 2021-07-05 PROCEDURE — 999N000065 XR CHEST PORT 1 VIEW

## 2021-07-05 RX ORDER — CEFTRIAXONE 2 G/1
2 INJECTION, POWDER, FOR SOLUTION INTRAMUSCULAR; INTRAVENOUS EVERY 24 HOURS
Status: DISCONTINUED | OUTPATIENT
Start: 2021-07-06 | End: 2021-07-08 | Stop reason: HOSPADM

## 2021-07-05 RX ORDER — LIDOCAINE 50 MG/G
OINTMENT TOPICAL ONCE
Status: COMPLETED | OUTPATIENT
Start: 2021-07-05 | End: 2021-07-05

## 2021-07-05 RX ORDER — FUROSEMIDE 20 MG
20 TABLET ORAL DAILY
Status: DISCONTINUED | OUTPATIENT
Start: 2021-07-06 | End: 2021-07-05

## 2021-07-05 RX ORDER — NALOXONE HYDROCHLORIDE 0.4 MG/ML
0.4 INJECTION, SOLUTION INTRAMUSCULAR; INTRAVENOUS; SUBCUTANEOUS
Status: DISCONTINUED | OUTPATIENT
Start: 2021-07-05 | End: 2021-07-06

## 2021-07-05 RX ORDER — LIDOCAINE 40 MG/G
CREAM TOPICAL
Status: DISCONTINUED | OUTPATIENT
Start: 2021-07-05 | End: 2021-07-06

## 2021-07-05 RX ORDER — POTASSIUM CHLORIDE 1500 MG/1
20 TABLET, EXTENDED RELEASE ORAL ONCE
Status: COMPLETED | OUTPATIENT
Start: 2021-07-05 | End: 2021-07-05

## 2021-07-05 RX ORDER — LIDOCAINE HYDROCHLORIDE 40 MG/ML
1.5 SOLUTION TOPICAL ONCE
Status: COMPLETED | OUTPATIENT
Start: 2021-07-05 | End: 2021-07-05

## 2021-07-05 RX ORDER — FUROSEMIDE 20 MG
20 TABLET ORAL DAILY
Status: DISCONTINUED | OUTPATIENT
Start: 2021-07-05 | End: 2021-07-05

## 2021-07-05 RX ORDER — FLUMAZENIL 0.1 MG/ML
0.2 INJECTION, SOLUTION INTRAVENOUS
Status: DISCONTINUED | OUTPATIENT
Start: 2021-07-05 | End: 2021-07-06

## 2021-07-05 RX ORDER — SODIUM CHLORIDE 9 MG/ML
INJECTION, SOLUTION INTRAVENOUS CONTINUOUS PRN
Status: DISCONTINUED | OUTPATIENT
Start: 2021-07-05 | End: 2021-07-06

## 2021-07-05 RX ORDER — DEXTROSE MONOHYDRATE 25 G/50ML
9.5 INJECTION, SOLUTION INTRAVENOUS
Status: DISCONTINUED | OUTPATIENT
Start: 2021-07-05 | End: 2021-07-06

## 2021-07-05 RX ORDER — POTASSIUM CHLORIDE 1500 MG/1
20 TABLET, EXTENDED RELEASE ORAL ONCE
Status: DISCONTINUED | OUTPATIENT
Start: 2021-07-06 | End: 2021-07-05

## 2021-07-05 RX ORDER — NALOXONE HYDROCHLORIDE 0.4 MG/ML
0.2 INJECTION, SOLUTION INTRAMUSCULAR; INTRAVENOUS; SUBCUTANEOUS
Status: DISCONTINUED | OUTPATIENT
Start: 2021-07-05 | End: 2021-07-06

## 2021-07-05 RX ORDER — AMLODIPINE BESYLATE 5 MG/1
5 TABLET ORAL DAILY
Status: DISCONTINUED | OUTPATIENT
Start: 2021-07-05 | End: 2021-07-06

## 2021-07-05 RX ORDER — FENTANYL CITRATE 50 UG/ML
25 INJECTION, SOLUTION INTRAMUSCULAR; INTRAVENOUS
Status: DISCONTINUED | OUTPATIENT
Start: 2021-07-05 | End: 2021-07-06

## 2021-07-05 RX ORDER — GLYCOPYRROLATE 0.2 MG/ML
0.1 INJECTION, SOLUTION INTRAMUSCULAR; INTRAVENOUS ONCE
Status: COMPLETED | OUTPATIENT
Start: 2021-07-05 | End: 2021-07-05

## 2021-07-05 RX ADMIN — AMPICILLIN SODIUM 2 G: 2 INJECTION, POWDER, FOR SOLUTION INTRAVENOUS at 05:06

## 2021-07-05 RX ADMIN — CYANOCOBALAMIN TAB 1000 MCG 1000 MCG: 1000 TAB at 10:55

## 2021-07-05 RX ADMIN — CEFTRIAXONE SODIUM 2 G: 2 INJECTION, POWDER, FOR SOLUTION INTRAMUSCULAR; INTRAVENOUS at 02:09

## 2021-07-05 RX ADMIN — AMPICILLIN SODIUM 2 G: 2 INJECTION, POWDER, FOR SOLUTION INTRAVENOUS at 12:59

## 2021-07-05 RX ADMIN — TOPICAL ANESTHETIC 0.5 ML: 200 SPRAY DENTAL; PERIODONTAL at 08:18

## 2021-07-05 RX ADMIN — MIDAZOLAM HYDROCHLORIDE 1 MG: 1 INJECTION, SOLUTION INTRAMUSCULAR; INTRAVENOUS at 08:35

## 2021-07-05 RX ADMIN — LIDOCAINE HYDROCHLORIDE ANHYDROUS 1 ML: 10 INJECTION, SOLUTION INFILTRATION at 12:50

## 2021-07-05 RX ADMIN — FENTANYL CITRATE 25 MCG: 50 INJECTION, SOLUTION INTRAMUSCULAR; INTRAVENOUS at 08:28

## 2021-07-05 RX ADMIN — AMPICILLIN SODIUM 2 G: 2 INJECTION, POWDER, FOR SOLUTION INTRAVENOUS at 00:17

## 2021-07-05 RX ADMIN — METOPROLOL TARTRATE 25 MG: 25 TABLET, FILM COATED ORAL at 10:56

## 2021-07-05 RX ADMIN — MIDAZOLAM HYDROCHLORIDE 0.5 MG: 1 INJECTION, SOLUTION INTRAMUSCULAR; INTRAVENOUS at 08:28

## 2021-07-05 RX ADMIN — FOLIC ACID 2000 MCG: 1 TABLET ORAL at 10:56

## 2021-07-05 RX ADMIN — LIDOCAINE HYDROCHLORIDE 1.5 ML: 40 SOLUTION TOPICAL at 08:15

## 2021-07-05 RX ADMIN — GABAPENTIN 200 MG: 100 CAPSULE ORAL at 21:27

## 2021-07-05 RX ADMIN — CEFTRIAXONE SODIUM 2 G: 2 INJECTION, POWDER, FOR SOLUTION INTRAMUSCULAR; INTRAVENOUS at 13:06

## 2021-07-05 RX ADMIN — AMPICILLIN SODIUM 2 G: 2 INJECTION, POWDER, FOR SOLUTION INTRAVENOUS at 18:46

## 2021-07-05 RX ADMIN — PANTOPRAZOLE SODIUM 40 MG: 40 TABLET, DELAYED RELEASE ORAL at 16:24

## 2021-07-05 RX ADMIN — SODIUM CHLORIDE: 9 INJECTION, SOLUTION INTRAVENOUS at 08:09

## 2021-07-05 RX ADMIN — POTASSIUM CHLORIDE 20 MEQ: 1500 TABLET, EXTENDED RELEASE ORAL at 16:23

## 2021-07-05 RX ADMIN — ATORVASTATIN CALCIUM 20 MG: 20 TABLET, FILM COATED ORAL at 21:27

## 2021-07-05 RX ADMIN — METOPROLOL TARTRATE 25 MG: 25 TABLET, FILM COATED ORAL at 21:27

## 2021-07-05 RX ADMIN — AMLODIPINE BESYLATE 5 MG: 5 TABLET ORAL at 16:24

## 2021-07-05 RX ADMIN — GLYCOPYRROLATE 0.1 MG: 0.2 INJECTION, SOLUTION INTRAMUSCULAR; INTRAVENOUS at 08:13

## 2021-07-05 ASSESSMENT — ACTIVITIES OF DAILY LIVING (ADL)
ADLS_ACUITY_SCORE: 24
ADLS_ACUITY_SCORE: 26
ADLS_ACUITY_SCORE: 24
ADLS_ACUITY_SCORE: 24

## 2021-07-05 NOTE — PROGRESS NOTES
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  - No invasive intervention recommended by the Neurology Repeat CT head shows Possible subacute ischemic infarct in the right frontal and left occipital lobe, CTA head shows nonocclusive thrombus at the proxima basilar artery remain stable.  - MRI of the brain shows acute/early subacute infarct at the right joey/medullary junction No other significant past medical history or family history. No hemorrhage.   - Neurology.   - MARK ANTHONY discussed with patient family and cardiology. Family considering risk/benefit. From the neurological and ID perspective, they are desiring MARK ANTHONY to evaluate for endocarditis with direct valvular involvement, and which point there may be less benefit for anticoagulant  -neurology suspect afib rather than endocarditis as culprit, however given high likelilhood of endocarditis risk for bleeding high on anticoagulation. Neurology recommending MARK ANTHONY.   -Neurology recommending risk for intraluminal thrombus in basilar artery and hx afib and potential bleeding risk with  possible endocarditis, risk benefit ratio of anticoagulation considered by team. Given known thrombus neurology recommending continued anticoagulation with heparin gtt. Low threshhold to image brain if change in mental status.   -neurology stable. Had some nausea earlier  R facial droop. Has chronic bilateral foot drop  Updated pt's son 7/4  -7/3: routine repeat CTA showed resolution of thrombus  Repeat MRI Brain showed expected evolution of prior stroke, hold left occipital, right frontal and L pontine infarcts    -sbp seems improved with increase in metoprolol to 25mg bid.   -doing well. Stable neurologically, facial droop nearly resolved.   -coumadin held and INR followed, pt started on heparin gtt. INR increased to goal of 2-3. Heparin gtt stopped.     -MARK ANTHONY 7/5 negative for thrombus or vegitation  1. The left ventricle is normal in structure, function and size. The visual  ejection fraction is estimated at 60%.  2. The right ventricle is mild to moderately dilated. The right ventricular  systolic function is normal.  3. There is mod-severe to severe (3-4+) tricuspid regurgitation. Small area of  failure to coapt of TV.  4. The right ventricular systolic pressure is approximated at 39mmHg plus the  right atrial pressure.  5. s/p tissue AVR, implanted 2016. Valve leaflets open well, no thrombus or  vegetation.     -per stroke neurology, stroke etiology likely cardioembolic 2/2 to atrial fibrillation.   -neurologically stable.     Plan:   -stop coumadin, allow INR to drift down. Per neurology recommendations, recommend anticoagulation with DOAC, preferably apixiban/Eliquis once INR <2. ---> INR 2.1. will recheck INR in am if <2 as expected will start Eliquis 5mg bid. Discussed with neurology  -pharmD liason for DOAC coverage  -abx per ID-see note  -q4 horu neuro checks  -goal sbp <130/80  -cont statin 20mg every day.   - Neurologic monitoring.   - Therapies following.   - Neurology recommends anticoagulation for  now  - no current plans for comfort based approach  -Continue decreased dose atorvastatin 20 mg daily in light of LDL 31 and increased risk of ICH in LDL<40  - Follow up with Stroke Neurology with repeat CTA (ordered for you) in 1-2 months     Updated pt's dtr on care plan          Permanent atrial fib She is on Metoprolol,   -rate controlled.   - oral Metoprolol - increased dose to 25mg bid to improve htn control  - Neurology managing anticoagulation.   -follow up primary cardiologist    Moderate to severe TR (3-4+)  -no signs RV failure  -RV mild to moderate dilation  -has ~1+ bLE edema to thigh  Plan;   Follow for LE edema  Have pt follow up with cardiology  -will start lasix 20mg every day with Kdur 20meq every day   -check bmp     Possible Community acquired Pneumonia vs Aspiration Pneumonia.   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   -TTE - no evidence of endocarditis.   - Ampicillin. Ceftriaxone. Defer to ID.   - ID following.   - Repeat blood culture on 6/29 positive, ID following.   - MARK ANTHONY ordered, however, cardiology requesting GI consult.   - GI consult at Cards request.   -iv ampicillin and ceftriaxone per ID  - follow blood cx: 6/28 +enterooccus 2/2, 6/30 +1/2, 7/1 ngtd, 7/3 ngtd  -MARK ANTHONY Monday  - PICC placed 7/5 in anticipation of long term IV antibiotics for 6 weeks until 8/12/2021  -Will need weekly labs and ID FUP with  Dr. Patel 2-3 weeks after discharge     Acute Renal Failure-resolved   -Patient has recently elevated creatinine in the range of 1.2-1.4, but in may of 2021 and march of 2021 she has normal creatinine.   -Most likely develop ARF during her admission with GI bleeding and likely in ATN at this time. She received IV contrast twice for CTA head and neck  and perfusion,.   -Cr down to 0.94. resolved.   - Avoid NSAID's and nephrotoxic medications.   -bmp at tcu     Chronic Anemia, H/O Recent Upper GI bleeding secondary to Angio ectasia   -Patient appears to have chronic anemia with recent acute on chronic anemia secondary to GI bleeding because of  duodenal angioectasia s/p clip placement.   -Hb stable 8-9 range.   - Protonix   -follow Hb, recheck at tcu     History of stroke prior old Right frontal, and recently left Cerebellar on 4/8/21, and unable to walk related to left leg weakness Wheel chair bound.   - Monitor.      Benign essential hypertension  -slightly hypertensive  -goal <130/80.   -On Metoprolol 12.5 mg bid at home.   Increased to 25mg bid  -add norvasc 5mg every day 7/5 started  -follow       S/P AVR & Ascending Aortic Aneurysm repair Stable.  - Monitor.      Bilateral Lower Extremity edema and Weakness. She has 1+ LE edema, most likely chronic, per report. US Venous Bilateral LE negative for DVT  - Monitor.      Hypokalemia, Hypernatremia. Hypernatremia likely because of NS, per report.   - Replace potassium as per protocol.  - Monitor and correct.   -follow bmp,     Bilateral foot drop. Uses AFOs, chronic.     Access: picc line placed for abx on 7/5. Remove once complete longterm abx     DVT Prophylaxis: Heparin gtt. Defer to Neuro.      Code Status: No CPR- Do NOT Intubate     Disposition: needs TCU per OT.                       Emil Trevizo MD  Text Page  (7am to 6pm)  Interval History   Negative MARK ANTHONY this am  Stable night.   No complaints  Has had LE edema for several weeks.   No sob.   Updated pt on MARK ANTHONY and plan      -Data reviewed today: I reviewed all new labs and imaging results over the last 24 hours. I personally reviewed labs last 24 hours.     Physical Exam   Temp: 97.7  F (36.5  C) Temp src: Oral BP: 138/85 Pulse: 69   Resp: 14 SpO2: 95 % O2 Device: None (Room air)    Vitals:    06/27/21 0700 06/28/21 0400 06/28/21 1704   Weight: 81.6  kg (179 lb 14.3 oz) 83.6 kg (184 lb 4.9 oz) 80.1 kg (176 lb 9.4 oz)     Vital Signs with Ranges  Temp:  [97.5  F (36.4  C)-98  F (36.7  C)] 97.5  F (36.4  C)  Pulse:  [] 75  Resp:  [10-20] 12  BP: (120-177)/() 120/66  SpO2:  [93 %-99 %] 95 %  I/O last 3 completed shifts:  In: 240 [P.O.:240]  Out: 2500 [Urine:2500]    Constitutional: Up in bed  Respiratory: CTAB, breathing easily  Cardiovascular: irreg irreg rhythm. No r/g/m, crisp S2. 1+BLE edema. Trace edema thigh  GI: soft, nt, nd  Skin/Integumen: no rash   Neuro: R facial droop-very subtle, nl speech and mentation. Oriented, strength nl throughout with exception of chronic BLE foot drop. CN 2-12 otherwise intact,   Psych: pleasant,cooperative    Medications     [Held by provider] heparin Stopped (07/04/21 1713)     - MEDICATION INSTRUCTIONS -       sodium chloride Stopped (07/05/21 0858)       amLODIPine  5 mg Oral Daily     ampicillin  2 g Intravenous Q6H     atorvastatin  20 mg Oral QPM     cefTRIAXone  2 g Intravenous Q12H     cyanocobalamin  1,000 mcg Oral Daily     folic acid  2,000 mcg Oral Daily     [START ON 7/6/2021] furosemide  20 mg Oral Daily     gabapentin  200 mg Oral At Bedtime     gadobutrol  8 mL Intravenous Once     metoprolol tartrate  25 mg Oral BID     pantoprazole  40 mg Oral BID AC     potassium chloride  20 mEq Oral Once     [START ON 7/6/2021] potassium chloride  20 mEq Oral Once     sodium chloride 0.9 %  100 mL As instructed Once     sodium chloride (PF)  10 mL Intracatheter Q8H     sodium chloride (PF)  3 mL Intracatheter Q8H     sodium chloride (PF)  3 mL Intracatheter Q8H       Data   Recent Labs   Lab 07/04/21  1534 07/04/21  0859 07/03/21  2248 07/03/21  0805 07/03/21  0805 07/02/21  0906 07/02/21  0906 07/01/21  0712 07/01/21  0712   WBC  --  7.4  --   --  8.8  --  9.0  --  8.8   HGB  --  9.2*  --   --  9.2*  --  9.3*  --  8.6*   MCV  --  85  --   --  84  --  84  --  84   PLT  --  304  --   --  322  --  296  --  261    INR 2.81*  --   --   --  2.64*  --  2.04*  --  1.53*   NA  --  140  --   --  143  --  147*  --  144   POTASSIUM 3.5 3.4 3.9   < > 3.2*   < > 3.3*   < > 3.1*   CHLORIDE  --  111*  --   --  113*  --  117*  --  114*   CO2  --  25  --   --  25  --  25  --  23   BUN  --  7  --   --  9  --  10  --  10   CR  --  0.97  --   --  0.94  --  1.09*  --  1.25*   ANIONGAP  --  4  --   --  5  --  5  --  7   MANDI  --  8.3*  --   --  8.4*  --  8.3*  --  8.1*   GLC  --  93  --   --  88  --  96  --  96   ALBUMIN  --   --   --   --   --   --  1.9*  --  1.7*   PROTTOTAL  --   --   --   --   --   --  7.2  --   --    BILITOTAL  --   --   --   --   --   --  0.3  --   --    ALKPHOS  --   --   --   --   --   --  79  --   --    ALT  --   --   --   --   --   --  10  --   --    AST  --   --   --   --   --   --  13  --   --     < > = values in this interval not displayed.       Imaging:   Recent Results (from the past 24 hour(s))   *Transesophageal Echocardiogram    Narrative    Abhishek Kerr MD     7/5/2021  9:02 AM  [facility]    Procedure: *Transesophageal Echocardiogram    Date/Time: 7/5/2021 9:00 AM  Performed by: Abhishek Kerr MD  Authorized by: Abhishek Kerr MD     UNIVERSAL PROTOCOL   Site Marked: NA  Prior Images Obtained and Reviewed:  Yes  Required items: Required blood products, implants, devices and special   equipment available    Patient identity confirmed:  Verbally with patient  Patient was reevaluated immediately before administering moderate or deep   sedation or anesthesia  Confirmation Checklist:  Patient's identity using two indicators  Time out: Immediately prior to the procedure a time out was called    Universal Protocol: the Joint Commission Universal Protocol was followed          SEDATION    Patient Sedated: Yes    Sedation:  Midazolam and fentanyl  Vital signs: Vital signs monitored during sedation    PROCEDURE   Patient Tolerance:  Patient tolerated  the procedure well with no immediate   complications  Describe Procedure: MARK ANTHONY Note    Indication: CVA    Informed consent was signed by the patient.  A timeout was taken.    Sedation:  Versed 1.5mg  Fentanyl 25mcg    Findings:  LV: EF 60%  RV: normal  LA: no thrombus  Mitral valve: mild MR  Aortic valve: tissue AVR, normal function  Tricuspid valve: 3-4+ TR  Atrial septum: negative bubble study  Aorta: s/p repair, normal    No complications.    Abhishek Kerr MD  Cardiology - [facility]  Pager: [phone]  Text Page  2021    Length of time physician/provider present for 1:1 monitoring during   sedation: 20   Transesophageal Echocardiogram    Narrative    [id number]  Critical access hospital  [id number]  [id number]^TOMASA^NEL^ESTELA     [facility]  Echocardiography Laboratory  [address] [place] [address]     Name: GENEVIEVE MERCADO  MRN: [id number]  : 1932  Study Date: 2021 08:20 AM  Age: 89 yrs  Gender: Female  Patient Location: [facility]  Reason For Study: Endocarditis  Ordering Physician: NEL RANDOLPH  Referring Physician: TIERA RAO  Performed By: PEREZ Salazar     BSA: 1.8 m2  Height: 61 in  Weight: 176 lb  HR: 105  BP: 172/62 mmHg  ______________________________________________________________________________  Procedure  Complete MARK ANTHONY Adult.  ______________________________________________________________________________  Interpretation Summary     1. The left ventricle is normal in structure, function and size. The visual  ejection fraction is estimated at 60%.  2. The right ventricle is mild to moderately dilated. The right ventricular  systolic function is normal.  3. There is mod-severe to severe (3-4+) tricuspid regurgitation. Small area of  failure to coapt of TV.  4. The right ventricular systolic pressure is approximated at 39mmHg plus the  right atrial pressure.  5. s/p tissue AVR, implanted . Valve leaflets open well, no thrombus or  vegetation.      Echo from 21 showed EF 60%, mild RV dilation, 2+ TR.  ______________________________________________________________________________  MARK ANTHONY  Versed (1.5mg) was given intravenously. Fentanyl (25mcg) was given  intravenously. I determined this patient to be an appropriate candidate for  the planned sedation and procedure and have reassessed the patient immediately  prior to sedation and procedure. Total sedation time: 23 minutes of continuous  bedside 1:1 monitoring. Consent to the procedure was obtained prior to  sedation. The transesophageal probe was passed without difficulty. There were  no complications associated with this procedure.     Left Ventricle  The left ventricle is normal in structure, function and size. The visual  ejection fraction is estimated at 60%. Normal left ventricular wall motion.     Right Ventricle  The right ventricle is mild to moderately dilated. The right ventricular  systolic function is normal.     Atria  No thrombus is detected in the left atrial appendage.     Mitral Valve  There is mild (1+) mitral regurgitation.     Tricuspid Valve  There is mod-severe to severe (3-4+) tricuspid regurgitation. Small area of  failure to coapt of TV. The right ventricular systolic pressure is  approximated at 39mmHg plus the right atrial pressure.     Aortic Valve  No aortic regurgitation is present. s/p tissue AVR, implanted . Valve  leaflets open well, no thrombus or vegetation.     Pulmonic Valve  The pulmonic valve is not well seen, but is grossly normal.     Vessels  s/p ascending aorta repair. Dilated pulmonary veins.     Pericardial/Pleural  There is no pericardial effusion.     Rhythm  The rhythm was atrial fibrillation.  ______________________________________________________________________________  MMode/2D Measurements & Calculations  asc Aorta Diam: 3.4 cm     Doppler Measurements & Calculations  TR max katie: 313.7 cm/sec  TR max P.4 mmHg      ______________________________________________________________________________  Report approved by: Adalid Patel 07/05/2021 09:29 AM         XR Chest Port 1 View    Narrative    CHEST ONE VIEW  7/5/2021 1:09 PM     HISTORY: Atrial fibrillation.    COMPARISON: June 27, 2021      Impression    IMPRESSION: Right PICC tip in the low SVC. Mild left lower lobe  atelectasis and/or infiltrate similar to previous.    PAVEL SEYMOUR MD          SYSTEM ID:  [id number]

## 2021-07-05 NOTE — PROGRESS NOTES
CLINICAL NUTRITION SERVICES - REASSESSMENT NOTE      Recommendations Ordered by Registered Dietitian (RD):   Ensure Enlive Strawberry with Breakfast and Dinner    Future/Additional Recommendations:   Diet per SLP   Malnutrition:   % Weight Loss:  None noted  % Intake:  <75% for > 7 days (non-severe malnutrition)  Subcutaneous Fat Loss:  None observed  Muscle Loss:  Some age related muscle loss - does not meet malnutrition criteria   Fluid Retention:  Mild 2+ generalized     Malnutrition Diagnosis: Non-Severe malnutrition  In Context of:  Acute illness or injury       EVALUATION OF PROGRESS TOWARD GOALS   Diet:  DD2 + Thin Liquids (per SLP on 7/2)     Intake/Tolerance:  Variable PO intake documented (% of meals). Receiving 2-3 meals daily over admission. Spoke with patient at bedside this morning who noted appetite is starting to improve -- was previously down from baseline due to various procedures and NPO status causing interruptions to diet orders during admission. Writer ordered patient lunch for this afternoon and patient stated that she typically drinks Boost at home and would like to receive Ensure with meals while in the hospital.       ASSESSED NUTRITION NEEDS:  Dosing Weight:  54.5 kg (based on lowest wt this adm of 74.8 kg on 6/26 and IBW of 47.7 kg)  Estimated Energy Needs: 1536-7495 kcal (25-30 kcal/kg)  Justification: maintenance per wt status   Estimated Protein Needs: 65-80 grams (1.2-1.5 g/kg)  Justification: preservation of LBM       NEW FINDINGS:   - 7/5 MARK ANTHONY: no vegetation or thrombosis, EF 60%  - Last BM noted 7/2   - No new labs today, K/Mg/Phos all WNL on 7/4   - BG 93     Previous Goals:   No previous goals    Previous Nutrition Diagnosis:   No nutrition diagnosis at this time.  Evaluation: Declining      CURRENT NUTRITION DIAGNOSIS  Inadequate oral intake related to decreased appetite, frequent NPO status with procedures as evidenced by variable PO intake over admission with  documentation of % of 2-3 meals daily     INTERVENTIONS  Recommendations / Nutrition Prescription  Diet per SLP  Ensure Enlive Strawberry with Breakfast and Dinner     Implementation  Medical Food Supplement    Goals  Patient will consume >/= 75% of meal trays TID or the equivalent with supplements       MONITORING AND EVALUATION:  Progress towards goals will be monitored and evaluated per protocol and Practice Guidelines      Shiela Barkley RD

## 2021-07-05 NOTE — PROCEDURES
[facility]    Procedure: *Transesophageal Echocardiogram    Date/Time: 7/5/2021 9:00 AM  Performed by: Abhishek Kerr MD  Authorized by: Abhishek Kerr MD     UNIVERSAL PROTOCOL   Site Marked: NA  Prior Images Obtained and Reviewed:  Yes  Required items: Required blood products, implants, devices and special equipment available    Patient identity confirmed:  Verbally with patient  Patient was reevaluated immediately before administering moderate or deep sedation or anesthesia  Confirmation Checklist:  Patient's identity using two indicators  Time out: Immediately prior to the procedure a time out was called    Universal Protocol: the Joint Commission Universal Protocol was followed          SEDATION    Patient Sedated: Yes    Sedation:  Midazolam and fentanyl  Vital signs: Vital signs monitored during sedation    PROCEDURE   Patient Tolerance:  Patient tolerated the procedure well with no immediate complications  Describe Procedure: MARK ANTHONY Note    Indication: CVA    Informed consent was signed by the patient.  A timeout was taken.    Sedation:  Versed 1.5mg  Fentanyl 25mcg    Findings:  LV: EF 60%  RV: normal  LA: no thrombus  Mitral valve: mild MR  Aortic valve: tissue AVR, normal function  Tricuspid valve: 3-4+ TR  Atrial septum: negative bubble study  Aorta: s/p repair, normal    No complications.    Abhishek Kerr MD  Cardiology - [facility]  Pager: [phone]  Text Page  July 5, 2021    Length of time physician/provider present for 1:1 monitoring during sedation: 20

## 2021-07-05 NOTE — PRE-PROCEDURE
GENERAL PRE-PROCEDURE:   Procedure:  MARK ANTHONY  Date/Time:  7/5/2021 8:27 AM    Verbal consent obtained?: Yes    Written consent obtained?: Yes    Risks and benefits: Risks, benefits and alternatives were discussed    Consent given by:  Patient  Patient states understanding of procedure being performed: Yes    Patient's understanding of procedure matches consent: Yes    Procedure consent matches procedure scheduled: Yes    Expected level of sedation:  Moderate  Appropriately NPO:  Yes  ASA Class:  Class 2- mild systemic disease, no acute problems, no functional limitations  Mallampati  :  Grade 1- soft palate, uvula, tonsillar pillars, and posterior pharyngeal wall visible  Lungs:  Lungs clear with good breath sounds bilaterally  Heart:  Normal heart sounds and rate and systolic murmur  History & Physical reviewed:  History and physical reviewed and no updates needed  Statement of review:  I have reviewed the lab findings, diagnostic data, medications, and the plan for sedation

## 2021-07-05 NOTE — PLAN OF CARE
A/Ox4, VSS on RA. Refused to get OOB d/t being tired after the procedure today. Had MARK ANTHONY procedure in AM and PICC placement. NPO the whole morning d/t MARK ANTHONY procedure. Denies pain when asked. Motor strength weak, dorsiflex absent. On tele a-fib w/ CVR. Incontinent of B/B, placed purewick.

## 2021-07-05 NOTE — PROGRESS NOTES
Report called to floor to Ana Cristina MCFARLANE.  Informed of NPO til 1030.  Pt awake without c/o or O2.  See MARK ANTHONY flow sheet.

## 2021-07-05 NOTE — PROCEDURES
[facility]    Single Lumen PICC Placement    Date/Time: 7/5/2021 12:46 PM  Performed by: Ghazal Fowler RN  Authorized by: Roxanna Rm MD   Indications: vascular access    UNIVERSAL PROTOCOL   Site Marked: Yes  Prior Images Obtained and Reviewed:  Yes  Required items: Required blood products, implants, devices and special equipment available    Patient identity confirmed:  Verbally with patient, arm band and hospital-assigned identification number  NA - No sedation, light sedation, or local anesthesia  Confirmation Checklist:  Patient's identity using two indicators, relevant allergies, procedure was appropriate and matched the consent or emergent situation and correct equipment/implants were available  Time out: Immediately prior to the procedure a time out was called    Universal Protocol: the Joint Commission Universal Protocol was followed    Preparation: Patient was prepped and draped in usual sterile fashion    ESBL (mL):  1         ANESTHESIA    Anesthesia: Local infiltration      SEDATION    Patient Sedated: No        Preparation: skin prepped with ChloraPrep  Skin prep agent: skin prep agent completely dried prior to procedure  Sterile barriers: maximum sterile barriers were used: cap, mask, sterile gown, sterile gloves, and large sterile sheet  Hand hygiene: hand hygiene performed prior to central venous catheter insertion  Catheter type: single lumen  Catheter size: 4 Fr  Brand: Bard  Lot number: [id number]  Placement method: MST  Number of attempts: 2  Successful placement: yes  Orientation: left  Location: basilic vein  Site rationale: best vein  Arm circumference: adults 10 cm  Extremity circumference: 24.5  Visible catheter length: 6  Internal length: 40 cm  Total catheter length: 46  Dressing and securement: blood removed, dressing applied, chlorhexidine patch applied, securement device and sterile dressing applied  Post procedure assessment: placement verified by x-ray and  blood return through all ports (atrial fibrillation)  PROCEDURE   Patient Tolerance:  Patient tolerated the procedure well with no immediate complications  Describe Procedure: PICC placed Tyree  Without difficulty.

## 2021-07-05 NOTE — PROGRESS NOTES
[facility]    Infectious Disease Progress Note    Date of Service: 07/05/2021     Assessment :  1.  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 TTE & MARK ATNHONY without evidence of endocarditis but will need to treat as such. Blood cx negative since 7/1  2.  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  3.  History of recurrent urinary infections have included some systemic symptoms intermittently, never any true lower urinary symptoms and her cultures all grew mixed eleni.  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  5  Chronic renal insufficiency.  6.  Atrial fibrillation.     Recommendations :     1.  IV ampicillin + ceftriaxone   2. PICC in anticipation of long term IV antibiotics for 6 weeks until 8/12/2021  3. Will need weekly labs and ID FUP with  Dr. Patel 2-3 weeks after discharge  Recommendations were discussed with the attending physician      Roxanna Rm MD    Interval History   Resting . Has no complaints today . Tolerating antibiotics without side effects    Physical Exam   Temp: 97.7  F (36.5  C) Temp src: Oral BP: 138/85 Pulse: 69   Resp: 14 SpO2: 95 % O2 Device: None (Room air)    Vitals:    06/27/21 0700 06/28/21 0400 06/28/21 1704   Weight: 81.6 kg (179 lb 14.3 oz) 83.6 kg (184 lb 4.9 oz) 80.1 kg (176 lb 9.4 oz)     Vital Signs with Ranges  Temp:  [97.5  F (36.4  C)-98.2  F (36.8  C)] 97.7  F (36.5  C)  Pulse:  [] 69  Resp:  [10-20] 14  BP: (133-177)/() 138/85  SpO2:  [93 %-99 %] 95 %    Constitutional: Awake, alert, cooperative, no apparent distress  Lungs: lear to auscultation bilaterally  Card, RRR  Abdomen : Soft and non tender  Skin: No rashes, no cyanosis, no edema    Other:    Medications     [Held by provider] heparin Stopped (07/04/21 1713)     - MEDICATION INSTRUCTIONS  -       sodium chloride Stopped (07/05/21 0858)       ampicillin  2 g Intravenous Q6H     atorvastatin  20 mg Oral QPM     cefTRIAXone  2 g Intravenous Q12H     cyanocobalamin  1,000 mcg Oral Daily     folic acid  2,000 mcg Oral Daily     gabapentin  200 mg Oral At Bedtime     gadobutrol  8 mL Intravenous Once     metoprolol tartrate  25 mg Oral BID     pantoprazole  40 mg Oral BID AC     sodium chloride 0.9 %  100 mL As instructed Once     sodium chloride (PF)  10 mL Intracatheter Q8H     sodium chloride (PF)  3 mL Intracatheter Q8H     sodium chloride (PF)  3 mL Intracatheter Q8H       Data   All microbiology laboratory data reviewed.  Recent Labs   Lab Test 07/04/21  0859 07/03/21  0805 07/02/21  0906   WBC 7.4 8.8 9.0   HGB 9.2* 9.2* 9.3*   HCT 30.8* 29.9* 30.3*   MCV 85 84 84    322 296     Recent Labs   Lab Test 07/04/21  0859 07/03/21  0805 07/02/21  0906   CR 0.97 0.94 1.09*     No lab results found.  Recent Labs   Lab Test 07/03/21  1405 07/03/21  1359 07/01/21  0852 06/30/21  1316 06/28/21  1220   CULT No growth after 2 days No growth after 2 days No growth after 4 days No growth after 5 days  Cultured on the 3rd day of incubation:  Enterococcus faecalis  Susceptibility testing done on previous specimen  *  Critical Value/Significant Value called to and read back by  Gill Bhakta RN @0813 7/03/2021 gd   Cultured on the 2nd day of incubation:  Enterococcus faecalis  Susceptibility testing done on previous specimen  *  Critical Value/Significant Value, preliminary result only, called to and read back by  LUIS M MENJIVAR, RN 0647 6.30.21 NDP    Cultured on the 1st day of incubation:  Enterococcus faecalis  *  Critical Value/Significant Value, preliminary result only, called to and read back by  ANTHONY BERRY RN @8238 6.29.21 LM    (Note)  POSITIVE for ENTEROCOCCUS FAECALIS and NEGATIVE for Star/vanB genes  by [employer] multiplex nucleic acid test. Final identification and  antimicrobial  susceptibility testing will be verified by standard  methods.    Specimen tested with [employer] multiplex, gram-positive blood culture  nucleic acid test for the following targets: Staph aureus, Staph  epidermidis, Staph lugdunensis, other Staph species, Enterococcus  faecalis, Enterococcus faecium, Streptococcus species, S. agalactiae,  S. anginosus grp., S. pneumoniae, S. pyogenes, Listeria sp., mecA  (methicillin resistance) and Star/B (vancomycin resistance).    Critical Value/Significant Value called to and read back by  Veena Vogel RN 6/30/21 @ 0045 TF     Susceptibility              Enterococcus faecalis       REUBEN       AMPICILLIN <=2 ug/mL Sensitive       Gentamicin Screen   Sensitive1       PENICILLIN 2 ug/mL Sensitive       VANCOMYCIN 1 ug/mL Sensitive          7/5 MARK ANTHONY  Interpretation Summary     1. The left ventricle is normal in structure, function and size. The visual  ejection fraction is estimated at 60%.  2. The right ventricle is mild to moderately dilated. The right ventricular  systolic function is normal.  3. There is mod-severe to severe (3-4+) tricuspid regurgitation. Small area of  failure to coapt of TV.  4. The right ventricular systolic pressure is approximated at 39mmHg plus the  right atrial pressure.  5. s/p tissue AVR, implanted 2016. Valve leaflets open well, no thrombus or  vegetation.     Echo from 6-28-21 showed EF 60%, mild RV dilation, 2+ TR.  Attestation:  Total time on the floor involved in the patient's care: 25 minutes. Total time spent in counseling/care coordination: >50%

## 2021-07-05 NOTE — PROGRESS NOTES
Care Suites Procedure Nursing Note    Patient Information  Name: Ekaterina Thayer  Age: 89 year old    Procedure  Procedure: MARK ANTHONY and Bubble study by Dr COLEMAN Kerr.  Pt tolerated very well with IV sedation of Versed 1.5 mg and Fentanyl 25 mcg.  See MARK ANTHONY flow sheet.  Procedure start time: 0828  Procedure complete time: 0851  Concerns/abnormal assessment: none  If abnormal assessment, provider notified: N/A  Plan/Other: Post sedation care the back to Rm 748    Yeimi Sue RN

## 2021-07-05 NOTE — PROGRESS NOTES
[facility]    Stroke Progress Note    Interval Events  Patient without complaints or headache today    Impression  1. 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 Size and location of infarct (pontomedullary) is more likely to be secondary to be secondary to Afib rather than endocarditis; however, due to high likelihood of active endocarditis risk of bleeding on anticoagulation is high.     > Due to concurrent risk factors for stroke--intraluminal thrombus in the basilar artery and history of afib--and bleeding risk--infective endocarditis--careful consideration of risks and benefits need to be considered regarding anticoagulation therapy. Due to known thrombus and , would continue anticoagulation therapy with heparin gtt with low threshold to rescan patient with any changes in exam or mental status.   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, right frontal, left pontine infarcts  Cervical Carotid and Vertebral Artery Vascular Imaging: no stenoses  Echocardiogram: LVEF 55-60%, severe LA enlargement, moderate tricuspid regurg  EKG/Telemetry: a fib  LDL: 31  A1c: 5.8  Troponin: not obtained  Other testing: Not Applicable    Plan  - Defer antibiotic therapy to Infectious disease and primary team  - Continue q4 hour neurochecks while inpatient  - Continue therapeutic heparin gtt    -Due to recent use of warfarin okay to hold heparin as long as INR greater than 2   -If INR becomes less than 2 resume heparin GTT   - Will consider eliquis  "as long term anticoagulant going forward.   - Low threshold for CT Head with any changes to exam  - Goal normotension, <130/80 or lower if tolerated for overall reduced cardiovascular risk. Please titrate enteral meds to achieve. IV PRN BP meds only for SBP >180.  - Continue decreased dose atorvastatin 20 mg daily in light of LDL 31 and increased risk of ICH in LDL<40  - Follow up with Stroke Neurology with repeat CTA (ordered for you) in 1-2 months    Vascular Neurology will continue to follow. Please do not hesitate to contact us with questions or concerns, should they arise.    The Stroke Staff is Dr. Fernandes.    Guilherme Crotf MD ALISIA  Vascular Neurology Fellow  To page me or covering stroke neurology team member, click here: [facility]COM   Choose \"On Call\" tab at top, then search dropdown box for \"Neurology Adult\", select location, press Enter, then look for stroke/neuro ICU/telestroke.    ______________________________________________________    Medications   Home Meds  Prior to Admission medications    Medication Sig Start Date End Date Taking? Authorizing Provider   acetaminophen (TYLENOL) 325 MG tablet Take 650 mg by mouth every 8 hours as needed for mild pain   Yes Unknown, Entered By History   apixaban ANTICOAGULANT (ELIQUIS) 5 MG tablet Take 5 mg by mouth 2 times daily   Yes Unknown, Entered By History   atorvastatin (LIPITOR) 40 MG tablet Take 40 mg by mouth At Bedtime   Yes Unknown, Entered By History   cyanocobalamin (VITAMIN B-12) 1000 MCG tablet Take 1,000 mcg by mouth daily   Yes Unknown, Entered By History   ferrous sulfate (FEROSUL) 325 (65 Fe) MG tablet Take 325 mg by mouth three times a week Mon - Wed - Fri. Takes with orange juice.   Yes Unknown, Entered By History   folic acid (FOLVITE) 400 MCG tablet Take 2,000 mcg by mouth daily 400 mcg x 5 tablets for 1 month then on 7/18/21 decrease to 400 mcg daily   Yes Unknown, Entered By History   gabapentin (NEURONTIN) 300 MG capsule Take 300 mg by mouth At " Bedtime   Yes Unknown, Entered By History   latanoprost (XALATAN) 0.005 % ophthalmic solution Place 1 drop into both eyes At Bedtime   Yes Unknown, Entered By History   metoprolol tartrate (LOPRESSOR) 25 MG tablet Take 12.5 mg by mouth 2 times daily   Yes Unknown, Entered By History   mirtazapine (REMERON SOL-TAB) 15 MG ODT 15 mg by Orally disintegrating tablet route At Bedtime   Yes Unknown, Entered By History   omeprazole (PRILOSEC) 20 MG DR capsule Take 20 mg by mouth daily   Yes Unknown, Entered By History   ondansetron (ZOFRAN-ODT) 4 MG ODT tab Take 4 mg by mouth every morning   Yes Unknown, Entered By History   ondansetron (ZOFRAN-ODT) 4 MG ODT tab Take 4 mg by mouth 2 times daily as needed for nausea   Yes Unknown, Entered By History       Scheduled Meds    ampicillin  2 g Intravenous Q6H     atorvastatin  20 mg Oral QPM     cefTRIAXone  2 g Intravenous Q12H     cyanocobalamin  1,000 mcg Oral Daily     folic acid  2,000 mcg Oral Daily     gabapentin  200 mg Oral At Bedtime     gadobutrol  8 mL Intravenous Once     metoprolol tartrate  25 mg Oral BID     pantoprazole  40 mg Oral BID AC     sodium chloride 0.9 %  100 mL As instructed Once     sodium chloride (PF)  10 mL Intracatheter Q8H     sodium chloride (PF)  3 mL Intracatheter Q8H     sodium chloride (PF)  3 mL Intracatheter Q8H       Infusion Meds    [Held by provider] heparin Stopped (07/04/21 1713)     - MEDICATION INSTRUCTIONS -       sodium chloride Stopped (07/05/21 3083)       PRN Meds  acetaminophen, acetaminophen, sodium chloride (PF) **OR** dextrose, fentaNYL, flumazenil, lidocaine 4%, lidocaine 4%, lidocaine (buffered or not buffered), lidocaine (buffered or not buffered), lidocaine (buffered or not buffered), melatonin, midazolam, naloxone, naloxone, naloxone, naloxone, ondansetron **OR** ondansetron, - MEDICATION INSTRUCTIONS -, sodium chloride (PF), sodium chloride (PF), sodium chloride (PF), sodium chloride (PF), sodium chloride        PHYSICAL EXAMINATION  Temp:  [97.5  F (36.4  C)-98.2  F (36.8  C)] 97.7  F (36.5  C)  Pulse:  [] 69  Resp:  [10-20] 14  BP: (133-177)/() 138/85  SpO2:  [93 %-99 %] 95 %     General:  patient lying in bed without any acute distress    HEENT:  normocephalic/atraumatic, edentulous  Cardio:  -  Pulmonary:  no respiratory distress  Abdomen:  soft, non-distended  Extremities:  no edema  Skin:  intact     Neurologic  Mental Status:  Eyes open spontaneously, alert and attentive, oriented to self, time, place and circumstnce and hospital, but not time or circumstance. Spontaneous language is fluent and coherent with intact comprehension of simple commands, naming, and repetition.  Cranial Nerves:  VFF, pupils 3 mm, round, dysconjugate gaze at rest, L eye exotropia, right facial droop - mild, mild dysarthria  Motor:  normal muscle tone and bulk, no abnormal movements, able to move all limbs spontaneously, no drift BUEs, minimal antigravity strength BLEs,   Coordination:  not ataxia of observable movements  Station/Gait:  not tested    Stroke Scales    NIHSS  Interval (daily exam) (06/30/21 1136)   Interval Comments     1a. Level of Consciousness 0-->Alert, keenly responsive   1b. LOC Questions 0-->Answers both questions correctly   1c. LOC Commands 0-->Performs both tasks correctly   2.   Best Gaze 0-->Normal   3.   Visual 0-->No visual loss   4.   Facial Palsy 1-->Minor paralysis (flattened nasolabial fold, asymmetry on smiling)   5a. Motor Arm, Left 0-->No drift, limb holds 90 (or 45) degrees for full 10 secs   5b. Motor Arm, Right 0-->No drift, limb holds 90 (or 45) degrees for full 10 secs   6a. Motor Leg, Left 2-->Some effort against gravity, leg falls to bed by 5 secs, but has some effort against gravity   6b. Motor Leg, right 2-->Some effort against gravity, leg falls to bed by 5 secs, but has some effort against gravity   7.   Limb Ataxia 0-->Absent   8.   Sensory 0-->Normal, no sensory loss   9.    Best Language 0-->No aphasia, normal   10. Dysarthria 0-->Normal   11. Extinction and Inattention  0-->No abnormality   Total 5 (06/30/21 1136)       Imaging  I personally reviewed all imaging; relevant findings per HPI.     Lab Results Data   CBC  Recent Labs   Lab 07/04/21  0859 07/03/21  0805 07/02/21  0906   WBC 7.4 8.8 9.0   RBC 3.62* 3.58* 3.61*   HGB 9.2* 9.2* 9.3*   HCT 30.8* 29.9* 30.3*    322 296     Basic Metabolic Panel    Recent Labs   Lab 07/04/21  1534 07/04/21  0859 07/03/21  2248 07/03/21  0805 07/03/21  0805 07/02/21  0906 07/02/21  0906   NA  --  140  --   --  143  --  147*   POTASSIUM 3.5 3.4 3.9   < > 3.2*   < > 3.3*   CHLORIDE  --  111*  --   --  113*  --  117*   CO2  --  25  --   --  25  --  25   BUN  --  7  --   --  9  --  10   CR  --  0.97  --   --  0.94  --  1.09*   GLC  --  93  --   --  88  --  96   MANDI  --  8.3*  --   --  8.4*  --  8.3*    < > = values in this interval not displayed.     Liver Panel  Recent Labs   Lab 07/02/21  0906 07/01/21  0712 06/30/21  0619   PROTTOTAL 7.2  --   --    ALBUMIN 1.9* 1.7* 1.5*   BILITOTAL 0.3  --   --    ALKPHOS 79  --   --    AST 13  --   --    ALT 10  --   --      INR    Recent Labs   Lab Test 07/04/21  1534 07/03/21  0805 07/02/21  0906   INR 2.81* 2.64* 2.04*      Lipid Profile    Recent Labs   Lab Test 06/27/21  0725   CHOL 65   HDL 18*   LDL 31   TRIG 80     A1C    Recent Labs   Lab Test 07/02/21 0906   A1C 5.9*     Troponin I  No results for input(s): TROPI in the last 168 hours.

## 2021-07-05 NOTE — PLAN OF CARE
Pt here with new left CVA and DVT. A+Ox4. VSS on RA- BP runs slightly elevated. Tele: A- Fib with CVR. Neuros intact, except: BUE and BLE 4/5 motor strength, weak dorsiflexion and absent plantarflexion. NPO since midnight, normally Dd2, thin liquid diet. Up with Ax2 and sera steady- Sat up in chair half of the night. Incontinent of bowel and bladder- Purewick in place. Q2 turn and reposition. [place]. Saline locked. Denies pain. Infectious disease is following. [place] on the Aggression Stop Light Tool. Plan is for MARK ANTHONY and vascular access consult today. Discharge pending medical clearance.

## 2021-07-06 ENCOUNTER — APPOINTMENT (OUTPATIENT)
Dept: SPEECH THERAPY | Facility: CLINIC | Age: 86
DRG: 288 | End: 2021-07-06
Attending: HOSPITALIST
Payer: MEDICARE

## 2021-07-06 ENCOUNTER — APPOINTMENT (OUTPATIENT)
Dept: OCCUPATIONAL THERAPY | Facility: CLINIC | Age: 86
DRG: 288 | End: 2021-07-06
Attending: HOSPITALIST
Payer: MEDICARE

## 2021-07-06 LAB
ANION GAP SERPL CALCULATED.3IONS-SCNC: 5 MMOL/L (ref 3–14)
BACTERIA SPEC CULT: NO GROWTH
BUN SERPL-MCNC: 10 MG/DL (ref 7–30)
CALCIUM SERPL-MCNC: 8.1 MG/DL (ref 8.5–10.1)
CHLORIDE SERPL-SCNC: 109 MMOL/L (ref 94–109)
CO2 SERPL-SCNC: 28 MMOL/L (ref 20–32)
CREAT SERPL-MCNC: 0.88 MG/DL (ref 0.52–1.04)
GFR SERPL CREATININE-BSD FRML MDRD: 58 ML/MIN/{1.73_M2}
GLUCOSE SERPL-MCNC: 111 MG/DL (ref 70–99)
INR PPP: 1.7 (ref 0.86–1.14)
Lab: NORMAL
MAGNESIUM SERPL-MCNC: 1.7 MG/DL (ref 1.6–2.3)
PHOSPHATE SERPL-MCNC: 2.3 MG/DL (ref 2.5–4.5)
POTASSIUM SERPL-SCNC: 3.3 MMOL/L (ref 3.4–5.3)
POTASSIUM SERPL-SCNC: 3.8 MMOL/L (ref 3.4–5.3)
SODIUM SERPL-SCNC: 142 MMOL/L (ref 133–144)
SPECIMEN SOURCE: NORMAL

## 2021-07-06 PROCEDURE — 99232 SBSQ HOSP IP/OBS MODERATE 35: CPT | Mod: GC | Performed by: PSYCHIATRY & NEUROLOGY

## 2021-07-06 PROCEDURE — 250N000013 HC RX MED GY IP 250 OP 250 PS 637: Performed by: INTERNAL MEDICINE

## 2021-07-06 PROCEDURE — 97530 THERAPEUTIC ACTIVITIES: CPT | Mod: GO | Performed by: OCCUPATIONAL THERAPIST

## 2021-07-06 PROCEDURE — 250N000011 HC RX IP 250 OP 636: Performed by: SPECIALIST

## 2021-07-06 PROCEDURE — 99233 SBSQ HOSP IP/OBS HIGH 50: CPT | Performed by: INTERNAL MEDICINE

## 2021-07-06 PROCEDURE — 92526 ORAL FUNCTION THERAPY: CPT | Mod: GN

## 2021-07-06 PROCEDURE — 80048 BASIC METABOLIC PNL TOTAL CA: CPT | Performed by: HOSPITALIST

## 2021-07-06 PROCEDURE — 85610 PROTHROMBIN TIME: CPT | Performed by: HOSPITALIST

## 2021-07-06 PROCEDURE — 120N000001 HC R&B MED SURG/OB

## 2021-07-06 PROCEDURE — 83735 ASSAY OF MAGNESIUM: CPT | Performed by: HOSPITALIST

## 2021-07-06 PROCEDURE — 84132 ASSAY OF SERUM POTASSIUM: CPT | Performed by: INTERNAL MEDICINE

## 2021-07-06 PROCEDURE — 84100 ASSAY OF PHOSPHORUS: CPT | Performed by: HOSPITALIST

## 2021-07-06 PROCEDURE — 250N000011 HC RX IP 250 OP 636: Performed by: INTERNAL MEDICINE

## 2021-07-06 RX ORDER — METOPROLOL TARTRATE 25 MG/1
25 TABLET, FILM COATED ORAL 2 TIMES DAILY
DISCHARGE
Start: 2021-07-06

## 2021-07-06 RX ORDER — FUROSEMIDE 20 MG
20 TABLET ORAL DAILY
Status: DISCONTINUED | OUTPATIENT
Start: 2021-07-06 | End: 2021-07-08 | Stop reason: HOSPADM

## 2021-07-06 RX ORDER — POTASSIUM CHLORIDE 20MEQ/15ML
20 LIQUID (ML) ORAL ONCE
Status: COMPLETED | OUTPATIENT
Start: 2021-07-06 | End: 2021-07-06

## 2021-07-06 RX ORDER — POTASSIUM CHLORIDE 1500 MG/1
20 TABLET, EXTENDED RELEASE ORAL ONCE
Status: COMPLETED | OUTPATIENT
Start: 2021-07-07 | End: 2021-07-07

## 2021-07-06 RX ORDER — LISINOPRIL 2.5 MG/1
2.5 TABLET ORAL DAILY
Status: DISCONTINUED | OUTPATIENT
Start: 2021-07-07 | End: 2021-07-06

## 2021-07-06 RX ORDER — ATORVASTATIN CALCIUM 20 MG/1
20 TABLET, FILM COATED ORAL EVERY EVENING
DISCHARGE
Start: 2021-07-06

## 2021-07-06 RX ORDER — GABAPENTIN 100 MG/1
200 CAPSULE ORAL AT BEDTIME
DISCHARGE
Start: 2021-07-06 | End: 2021-07-06

## 2021-07-06 RX ORDER — POTASSIUM CHLORIDE 1500 MG/1
20 TABLET, EXTENDED RELEASE ORAL ONCE
Status: COMPLETED | OUTPATIENT
Start: 2021-07-06 | End: 2021-07-06

## 2021-07-06 RX ADMIN — AMPICILLIN SODIUM 2 G: 2 INJECTION, POWDER, FOR SOLUTION INTRAVENOUS at 11:59

## 2021-07-06 RX ADMIN — APIXABAN 5 MG: 5 TABLET, FILM COATED ORAL at 08:17

## 2021-07-06 RX ADMIN — APIXABAN 5 MG: 5 TABLET, FILM COATED ORAL at 20:37

## 2021-07-06 RX ADMIN — POTASSIUM CHLORIDE 20 MEQ: 20 SOLUTION ORAL at 10:53

## 2021-07-06 RX ADMIN — PANTOPRAZOLE SODIUM 40 MG: 40 TABLET, DELAYED RELEASE ORAL at 06:42

## 2021-07-06 RX ADMIN — PANTOPRAZOLE SODIUM 40 MG: 40 TABLET, DELAYED RELEASE ORAL at 16:08

## 2021-07-06 RX ADMIN — AMLODIPINE BESYLATE 5 MG: 5 TABLET ORAL at 08:16

## 2021-07-06 RX ADMIN — METOPROLOL TARTRATE 25 MG: 25 TABLET, FILM COATED ORAL at 20:37

## 2021-07-06 RX ADMIN — POTASSIUM & SODIUM PHOSPHATES POWDER PACK 280-160-250 MG 1 PACKET: 280-160-250 PACK at 20:37

## 2021-07-06 RX ADMIN — METOPROLOL TARTRATE 25 MG: 25 TABLET, FILM COATED ORAL at 08:16

## 2021-07-06 RX ADMIN — CEFTRIAXONE SODIUM 2 G: 2 INJECTION, POWDER, FOR SOLUTION INTRAMUSCULAR; INTRAVENOUS at 14:31

## 2021-07-06 RX ADMIN — AMPICILLIN SODIUM 2 G: 2 INJECTION, POWDER, FOR SOLUTION INTRAVENOUS at 18:24

## 2021-07-06 RX ADMIN — CYANOCOBALAMIN TAB 1000 MCG 1000 MCG: 1000 TAB at 08:16

## 2021-07-06 RX ADMIN — AMPICILLIN SODIUM 2 G: 2 INJECTION, POWDER, FOR SOLUTION INTRAVENOUS at 05:01

## 2021-07-06 RX ADMIN — FUROSEMIDE 20 MG: 20 TABLET ORAL at 14:31

## 2021-07-06 RX ADMIN — POTASSIUM & SODIUM PHOSPHATES POWDER PACK 280-160-250 MG 1 PACKET: 280-160-250 PACK at 10:53

## 2021-07-06 RX ADMIN — AMPICILLIN SODIUM 2 G: 2 INJECTION, POWDER, FOR SOLUTION INTRAVENOUS at 00:13

## 2021-07-06 RX ADMIN — ATORVASTATIN CALCIUM 20 MG: 20 TABLET, FILM COATED ORAL at 20:37

## 2021-07-06 RX ADMIN — POTASSIUM CHLORIDE 20 MEQ: 1500 TABLET, EXTENDED RELEASE ORAL at 14:31

## 2021-07-06 RX ADMIN — ONDANSETRON 4 MG: 4 TABLET, ORALLY DISINTEGRATING ORAL at 12:08

## 2021-07-06 RX ADMIN — FOLIC ACID 2000 MCG: 1 TABLET ORAL at 08:17

## 2021-07-06 ASSESSMENT — ACTIVITIES OF DAILY LIVING (ADL)
ADLS_ACUITY_SCORE: 24
ADLS_ACUITY_SCORE: 26
ADLS_ACUITY_SCORE: 24

## 2021-07-06 ASSESSMENT — MIFFLIN-ST. JEOR: SCORE: 1082.38

## 2021-07-06 NOTE — PLAN OF CARE
Reason for Admission: L CVA    Cognitive/Mentation: A/Ox 4  Neuros/CMS: Intact ex   VS: stable. Tele: A fib with CVR.  GI: BS active, + flatus, last BM 7/15/21. InContinent.  : voiding. InContinent.  Pulmonary: LS clear.  Pain: denies.     Drains: PIV x 2. PICC  Skin: intact ex blanchable redness on coccyx  Activity: Assist x 2 with sarasteady.  Diet: DD2 with thin liquids. Takes pills whole.     Therapies recs: TCU  Discharge: pending.     End of shift summary: Patient stable throughout shift.

## 2021-07-06 NOTE — PLAN OF CARE
Alert and oriented x4. Vital signs stable on RA. Assist of 2, rick steady. Tolerating DD2 diet, thin liquids. Lungs sounds clear. BS+. BM- this shift. Voiding. Incontinent of B/b. Denies pain. Denies nausea. Tele Afib, CVR. PICC in place, saline locked. Neuros intact except absent dorsiflexion.

## 2021-07-06 NOTE — PROGRESS NOTES
Care Management Follow Up    Length of Stay (days): 10    Expected Discharge Date: 07/07/21(TCU)     Concerns to be Addressed: discharge planning  pt has been at [facility] previously  Patient plan of care discussed at interdisciplinary rounds: Yes    Anticipated Discharge Disposition: Transitional Care     Anticipated Discharge Services:    Anticipated Discharge DME:      Patient/family educated on Medicare website which has current facility and service quality ratings: yes  Education Provided on the Discharge Plan:    Patient/Family in Agreement with the Plan: yes    Referrals Placed by CM/SW:    Private pay costs discussed: Not applicable    Additional Information:    Per Jackelyn at [facility] () , they would not be able to take someone who is LTC appropriate into their TCU as they do not anticipate having any LTC beds in the near future when pt would be ready for discharge.  HAKEEM called Vannessa back and left VM explaining son would look elsewhere for LTC placement once TCU stay is completed, asked for call back to discuss this.     SUZY Sosa   [facility]  [phone]        Can;t let someone come into TCU when on a wait list for their LTC

## 2021-07-06 NOTE — PROGRESS NOTES
[facility]    Infectious Disease Progress Note    Date of Service: 07/06/2021     Assessment :  1.  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 TTE & MARK ANTHONY without evidence of endocarditis but will need to treat as such. Blood cx negative since 7/1  2.  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  3.  History of recurrent urinary infections have included some systemic symptoms intermittently, never any true lower urinary symptoms and her cultures all grew mixed eleni.  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  5  Chronic renal insufficiency.  6.  Atrial fibrillation.     Recommendations :     1.  IV ampicillin + ceftriaxone   2. PICC in anticipation of long term IV antibiotics for 6 weeks until 8/12/2021 orders all in  3. Will need weekly labs and ID FUP with  Dr. Patel 2-3 weeks after discharge  Disposition planning      Daniel Stephens MD    Interval History   Resting . Has no complaints today . Tolerating antibiotics without side effects    Physical Exam   Temp: 98  F (36.7  C) Temp src: Oral BP: (!) 154/86 Pulse: 80   Resp: 16 SpO2: 94 % O2 Device: None (Room air)    Vitals:    06/28/21 0400 06/28/21 1704 07/06/21 0737   Weight: 83.6 kg (184 lb 4.9 oz) 80.1 kg (176 lb 9.4 oz) 72 kg (158 lb 11.7 oz)     Vital Signs with Ranges  Temp:  [97.5  F (36.4  C)-98  F (36.7  C)] 98  F (36.7  C)  Pulse:  [66-96] 80  Resp:  [12-18] 16  BP: (120-154)/(66-97) 154/86  SpO2:  [93 %-97 %] 94 %    Constitutional: Awake, alert, cooperative, no apparent distress  Lungs: lear to auscultation bilaterally  Card, RRR  Abdomen : Soft and non tender  Skin: No rashes, no cyanosis, no edema    Other:    Medications     - MEDICATION INSTRUCTIONS -         amLODIPine  5 mg Oral Daily     ampicillin  2 g Intravenous Q6H      apixaban ANTICOAGULANT  5 mg Oral BID     atorvastatin  20 mg Oral QPM     cefTRIAXone  2 g Intravenous Q24H     cyanocobalamin  1,000 mcg Oral Daily     folic acid  2,000 mcg Oral Daily     gabapentin  200 mg Oral At Bedtime     metoprolol tartrate  25 mg Oral BID     pantoprazole  40 mg Oral BID AC     potassium & sodium phosphates  1 packet Oral or Feeding Tube BID     potassium chloride  20 mEq Oral or Feeding Tube Once     sodium chloride (PF)  10 mL Intracatheter Q8H     sodium chloride (PF)  3 mL Intracatheter Q8H       Data   All microbiology laboratory data reviewed.  Recent Labs   Lab Test 07/04/21  0859 07/03/21  0805 07/02/21  0906   WBC 7.4 8.8 9.0   HGB 9.2* 9.2* 9.3*   HCT 30.8* 29.9* 30.3*   MCV 85 84 84    322 296     Recent Labs   Lab Test 07/06/21  0513 07/05/21  1602 07/04/21  0859   CR 0.88 0.91 0.97     No lab results found.  Recent Labs   Lab Test 07/03/21  1405 07/03/21  1359 07/01/21  0852 06/30/21  1316 06/28/21  1220   CULT No growth after 3 days No growth after 3 days No growth after 5 days No growth  Cultured on the 3rd day of incubation:  Enterococcus faecalis  Susceptibility testing done on previous specimen  *  Critical Value/Significant Value called to and read back by  Gill Bhakta RN @2773 7/03/2021 gd   Cultured on the 2nd day of incubation:  Enterococcus faecalis  Susceptibility testing done on previous specimen  *  Critical Value/Significant Value, preliminary result only, called to and read back by  LUIS M MENJIVAR, RN 8508 6.30.21 NDP    Cultured on the 1st day of incubation:  Enterococcus faecalis  *  Critical Value/Significant Value, preliminary result only, called to and read back by  ANTHONY BERRY RN @3545 6.29.21 LM    (Note)  POSITIVE for ENTEROCOCCUS FAECALIS and NEGATIVE for Star/vanB genes  by [employer] multiplex nucleic acid test. Final identification and  antimicrobial susceptibility testing will be verified by standard  methods.    Specimen tested  with [employer] multiplex, gram-positive blood culture  nucleic acid test for the following targets: Staph aureus, Staph  epidermidis, Staph lugdunensis, other Staph species, Enterococcus  faecalis, Enterococcus faecium, Streptococcus species, S. agalactiae,  S. anginosus grp., S. pneumoniae, S. pyogenes, Listeria sp., mecA  (methicillin resistance) and Star/B (vancomycin resistance).    Critical Value/Significant Value called to and read back by  Veena Vogel RN 6/30/21 @ 0045 TF     Susceptibility              Enterococcus faecalis       REUBEN       AMPICILLIN <=2 ug/mL Sensitive       Gentamicin Screen   Sensitive1       PENICILLIN 2 ug/mL Sensitive       VANCOMYCIN 1 ug/mL Sensitive          7/5 MARK ANTHONY  Interpretation Summary     1. The left ventricle is normal in structure, function and size. The visual  ejection fraction is estimated at 60%.  2. The right ventricle is mild to moderately dilated. The right ventricular  systolic function is normal.  3. There is mod-severe to severe (3-4+) tricuspid regurgitation. Small area of  failure to coapt of TV.  4. The right ventricular systolic pressure is approximated at 39mmHg plus the  right atrial pressure.  5. s/p tissue AVR, implanted 2016. Valve leaflets open well, no thrombus or  vegetation.     Echo from 6-28-21 showed EF 60%, mild RV dilation, 2+ TR.  Attestation:  Total time on the floor involved in the patient's care: 25 minutes. Total time spent in counseling/care coordination: >50%

## 2021-07-06 NOTE — PLAN OF CARE
Reason for Admission: CVA    Cognitive/Mentation: A/Ox 4  Neuros/CMS: BLE chronic foot drop  VS: Mild HTN. Tele: A-fib, CVR.  GI: BS: active, passing flatus, last BM today. Incontinent at times.  : Voiding. Incontinent.  Pulmonary: LS clear.  Pain: Denied.     Drains: Right PICC  Activity: Assist x 2, sarasteady.  Diet: Dd2, with thin liquids. Takes pills whole with water.     Therapies recs: TCU  Discharge: Pending

## 2021-07-06 NOTE — PROGRESS NOTES
[facility]    Stroke Progress Note    Interval Events  Patient without complaints or headache today    Impression  1. 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 Size and location of infarct (pontomedullary) is more likely to be secondary to be secondary to Afib rather than endocarditis; however, due to high likelihood of active endocarditis risk of bleeding on anticoagulation is high.     > Due to concurrent risk factors for stroke--intraluminal thrombus in the basilar artery and history of afib--and bleeding risk--infective endocarditis--careful consideration of risks and benefits need to be considered regarding anticoagulation therapy. Due to known thrombus and , would continue anticoagulation therapy with heparin gtt with low threshold to rescan patient with any changes in exam or mental status.   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, right frontal, left pontine infarcts  Cervical Carotid and Vertebral Artery Vascular Imaging: no stenoses  Echocardiogram: LVEF 55-60%, severe LA enlargement, moderate tricuspid regurg  EKG/Telemetry: a fib  LDL: 31  A1c: 5.8  Troponin: not obtained  Other testing: Not Applicable    Plan  - Defer antibiotic therapy to Infectious disease and primary team  - Continue q4 hour neurochecks while inpatient  - INR declined, resume Eliquis 5mg BID  - Low threshold for CT Head with any changes to exam  - Goal normotension, <130/80 or lower if tolerated for overall reduced cardiovascular risk. Please  "titrate enteral meds to achieve. IV PRN BP meds only for SBP >180.  - Continue decreased dose atorvastatin 20 mg daily in light of LDL 31 and increased risk of ICH in LDL<40  - Follow up with Stroke Neurology with repeat CTA (ordered for you) in 1-2 months    Vascular Neurology will continue to follow. Please do not hesitate to contact us with questions or concerns, should they arise.    The Stroke Staff is Dr. Fernandes.    Guilherme Croft MD ALISIA  Vascular Neurology Fellow  To page me or covering stroke neurology team member, click here: [facility]   Choose \"On Call\" tab at top, then search dropdown box for \"Neurology Adult\", select location, press Enter, then look for stroke/neuro ICU/telestroke.    ______________________________________________________    Medications   Home Meds  Prior to Admission medications    Medication Sig Start Date End Date Taking? Authorizing Provider   acetaminophen (TYLENOL) 325 MG tablet Take 650 mg by mouth every 8 hours as needed for mild pain   Yes Unknown, Entered By History   apixaban ANTICOAGULANT (ELIQUIS) 5 MG tablet Take 5 mg by mouth 2 times daily   Yes Unknown, Entered By History   atorvastatin (LIPITOR) 40 MG tablet Take 40 mg by mouth At Bedtime   Yes Unknown, Entered By History   cyanocobalamin (VITAMIN B-12) 1000 MCG tablet Take 1,000 mcg by mouth daily   Yes Unknown, Entered By History   ferrous sulfate (FEROSUL) 325 (65 Fe) MG tablet Take 325 mg by mouth three times a week Mon - Wed - Fri. Takes with orange juice.   Yes Unknown, Entered By History   folic acid (FOLVITE) 400 MCG tablet Take 2,000 mcg by mouth daily 400 mcg x 5 tablets for 1 month then on 7/18/21 decrease to 400 mcg daily   Yes Unknown, Entered By History   gabapentin (NEURONTIN) 300 MG capsule Take 300 mg by mouth At Bedtime   Yes Unknown, Entered By History   latanoprost (XALATAN) 0.005 % ophthalmic solution Place 1 drop into both eyes At Bedtime   Yes Unknown, Entered By History   metoprolol tartrate " (LOPRESSOR) 25 MG tablet Take 12.5 mg by mouth 2 times daily   Yes Unknown, Entered By History   mirtazapine (REMERON SOL-TAB) 15 MG ODT 15 mg by Orally disintegrating tablet route At Bedtime   Yes Unknown, Entered By History   omeprazole (PRILOSEC) 20 MG DR capsule Take 20 mg by mouth daily   Yes Unknown, Entered By History   ondansetron (ZOFRAN-ODT) 4 MG ODT tab Take 4 mg by mouth every morning   Yes Unknown, Entered By History   ondansetron (ZOFRAN-ODT) 4 MG ODT tab Take 4 mg by mouth 2 times daily as needed for nausea   Yes Unknown, Entered By History       Scheduled Meds    amLODIPine  5 mg Oral Daily     ampicillin  2 g Intravenous Q6H     apixaban ANTICOAGULANT  5 mg Oral BID     atorvastatin  20 mg Oral QPM     cefTRIAXone  2 g Intravenous Q24H     cyanocobalamin  1,000 mcg Oral Daily     folic acid  2,000 mcg Oral Daily     gabapentin  200 mg Oral At Bedtime     metoprolol tartrate  25 mg Oral BID     pantoprazole  40 mg Oral BID AC     potassium & sodium phosphates  1 packet Oral or Feeding Tube BID     sodium chloride (PF)  10 mL Intracatheter Q8H     sodium chloride (PF)  3 mL Intracatheter Q8H       Infusion Meds    - MEDICATION INSTRUCTIONS -         PRN Meds  acetaminophen, acetaminophen, lidocaine 4%, lidocaine (buffered or not buffered), lidocaine (buffered or not buffered), melatonin, ondansetron **OR** ondansetron, - MEDICATION INSTRUCTIONS -, sodium chloride (PF), sodium chloride (PF), sodium chloride (PF)       PHYSICAL EXAMINATION  Temp:  [97.5  F (36.4  C)-98  F (36.7  C)] 98  F (36.7  C)  Pulse:  [66-88] 80  Resp:  [12-16] 16  BP: (120-154)/(66-86) 154/86  SpO2:  [93 %-96 %] 94 %     General:  patient lying in bed without any acute distress    HEENT:  normocephalic/atraumatic, edentulous  Cardio:  regular rate  Pulmonary:  no respiratory distress  Abdomen:  soft, non-distended  Extremities:  no edema  Skin:  intact     Neurologic  Mental Status:  Eyes open spontaneously, alert and attentive,  oriented to self, time, place and circumstnce and hospital, but not time or circumstance. Spontaneous language is fluent and coherent with intact comprehension of simple commands, naming, and repetition.  Cranial Nerves:  VFF, pupils 3 mm, round, dysconjugate gaze at rest, L eye exotropia, right facial droop - mild improving, mild dysarthria  Motor:  normal muscle tone and bulk, no abnormal movements, able to move all limbs spontaneously, no drift BUEs, minimal antigravity strength BLEs,   Coordination:  not ataxia of observable movements  Station/Gait:  not tested    Stroke Scales    NIHSS  Interval (daily exam) (06/30/21 1136)   Interval Comments     1a. Level of Consciousness 0-->Alert, keenly responsive   1b. LOC Questions 0-->Answers both questions correctly   1c. LOC Commands 0-->Performs both tasks correctly   2.   Best Gaze 0-->Normal   3.   Visual 0-->No visual loss   4.   Facial Palsy 1-->Minor paralysis (flattened nasolabial fold, asymmetry on smiling)   5a. Motor Arm, Left 0-->No drift, limb holds 90 (or 45) degrees for full 10 secs   5b. Motor Arm, Right 0-->No drift, limb holds 90 (or 45) degrees for full 10 secs   6a. Motor Leg, Left 2-->Some effort against gravity, leg falls to bed by 5 secs, but has some effort against gravity   6b. Motor Leg, right 2-->Some effort against gravity, leg falls to bed by 5 secs, but has some effort against gravity   7.   Limb Ataxia 0-->Absent   8.   Sensory 0-->Normal, no sensory loss   9.   Best Language 0-->No aphasia, normal   10. Dysarthria 0-->Normal   11. Extinction and Inattention  0-->No abnormality   Total 5 (06/30/21 1136)       Imaging  I personally reviewed all imaging; relevant findings per HPI.     Lab Results Data   CBC  Recent Labs   Lab 07/04/21  0859 07/03/21  0805 07/02/21  0906   WBC 7.4 8.8 9.0   RBC 3.62* 3.58* 3.61*   HGB 9.2* 9.2* 9.3*   HCT 30.8* 29.9* 30.3*    322 296     Basic Metabolic Panel    Recent Labs   Lab 07/06/21  0511  07/05/21  1658 07/05/21  1602 07/04/21  0859 07/04/21  0859     --  143  --  140   POTASSIUM 3.3* 3.1* 3.1*   < > 3.4   CHLORIDE 109  --  110*  --  111*   CO2 28  --  27  --  25   BUN 10  --  7  --  7   CR 0.88  --  0.91  --  0.97   *  --  108*  --  93   MANDI 8.1*  --  8.3*  --  8.3*    < > = values in this interval not displayed.     Liver Panel  Recent Labs   Lab 07/02/21  0906 07/01/21  0712 06/30/21  0619   PROTTOTAL 7.2  --   --    ALBUMIN 1.9* 1.7* 1.5*   BILITOTAL 0.3  --   --    ALKPHOS 79  --   --    AST 13  --   --    ALT 10  --   --      INR    Recent Labs   Lab Test 07/06/21  0513 07/05/21  1602 07/04/21  1534   INR 1.70* 2.18* 2.81*      Lipid Profile    Recent Labs   Lab Test 06/27/21  0725   CHOL 65   HDL 18*   LDL 31   TRIG 80     A1C    Recent Labs   Lab Test 07/02/21  0906   A1C 5.9*     Troponin I  No results for input(s): TROPI in the last 168 hours.

## 2021-07-06 NOTE — PROGRESS NOTES
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 , S/P bioprosthetic AVR and Ascending Aorta tube graft for aneurysm, recurrent UTI's -- wheelchair bound, presented to [facility] ER this AM after waking up with new right face droop, and transferred to [facility]     Acute Ischemic Stroke likely cardioembolic due to Atrial fibrillation   -This is a 89 year old female, history of Afib not recently on Eliquis because of recent GI bleeding, presented with right facial droop,   - Initial CT head negative for acute stroke, CTA head and neck done shows new filling defect within the basilar artery, concerning for partially occlusive thrombus.No evidence of significant stenosis in either internal carotid arteries.  - No invasive intervention recommended by the Neurology Repeat CT head shows Possible subacute ischemic infarct in the right frontal and left occipital lobe, CTA head shows nonocclusive thrombus at the proxima basilar artery remain stable.  - MRI of the brain shows acute/early subacute infarct at the right joey/medullary junction No other significant past medical history or family history. No hemorrhage.   - Neurology.   - MARK ANTHONY discussed with patient family and cardiology. Family considering risk/benefit. From the neurological and ID perspective, they are desiring MARK ANTHONY to evaluate for endocarditis with direct valvular involvement, and which point there may be less benefit for anticoagulant  -neurology suspect afib rather than endocarditis as culprit, however given high likelilhood of endocarditis risk for bleeding high on anticoagulation. Neurology recommending MARK ANTHONY.   -Neurology recommending risk for intraluminal thrombus in basilar artery and hx afib and potential bleeding risk with  possible endocarditis, risk benefit ratio of anticoagulation considered by team. Given known thrombus neurology recommending continued anticoagulation with heparin gtt. Low threshhold to image brain if change in mental status.   -neurology stable. Had some nausea earlier  R facial droop. Has chronic bilateral foot drop  Updated pt's son 7/4  -7/3: routine repeat CTA showed resolution of thrombus  Repeat MRI Brain showed expected evolution of prior stroke, hold left occipital, right frontal and L pontine infarcts    -sbp seems improved with increase in metoprolol to 25mg bid.   -doing well. Stable neurologically, facial droop nearly resolved.   -coumadin held and INR followed, pt started on heparin gtt. INR increased to goal of 2-3. Heparin gtt stopped.     -MARK ANTHONY 7/5 negative for thrombus or vegitation  1. The left ventricle is normal in structure, function and size. The visual  ejection fraction is estimated at 60%.  2. The right ventricle is mild to moderately dilated. The right ventricular  systolic function is normal.  3. There is mod-severe to severe (3-4+) tricuspid regurgitation. Small area of  failure to coapt of TV.  4. The right ventricular systolic pressure is approximated at 39mmHg plus the  right atrial pressure.  5. s/p tissue AVR, implanted 2016. Valve leaflets open well, no thrombus or  vegetation.     -per stroke neurology, stroke etiology likely cardioembolic 2/2 to atrial fibrillation.   -neurologically stable.   -INR drifted down to <2 on 7/6, pre neurology start Eliquis    Plan:   -abx per ID-see note  -q4 horu neuro checks  -goal sbp <130/80  -cont statin 20mg every day. Lipid panel 5 weeks  - Neurologic monitoring.   - Therapies following.   -Continue decreased dose atorvastatin 20 mg daily in light of LDL 31 and increased risk of ICH in LDL<40  - Follow up with Stroke Neurology with repeat CTA (ordered for you) in 1-2 months     Updated pt's son on care plan at bedside          Permanent atrial  fib She is on Metoprolol,   -rate controlled.   - oral Metoprolol - increased dose to 25mg bid to improve htn control  - Neurology managing anticoagulation.   -follow up primary cardiologist    Moderate to severe TR (3-4+)  -no signs RV failure  -RV mild to moderate dilation  -has ~1+ bLE edema to thigh  Plan;   Follow for LE edema  Have pt follow up with cardiology  -will start lasix 20mg every day, first dose today with kdur x1  -check bmp am     Possible Community acquired Pneumonia vs Aspiration Pneumonia.   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   -TTE - no evidence of endocarditis.   - Ampicillin. Ceftriaxone. Defer to ID.   - ID following.   - Repeat blood culture on 6/29 positive, ID following.   - MARK ANTHONY ordered, however, cardiology requesting GI consult.   - GI consult at Cards request.   -iv ampicillin and ceftriaxone per ID  - follow blood cx: 6/28 +enterooccus 2/2, 6/30 +1/2, 7/1 ngtd, 7/3 ngtd  -MARK ANTHONY Monday  - PICC placed 7/5 in anticipation of long term IV antibiotics for 6 weeks until 8/12/2021  -Will need weekly labs and ID FUP with  Dr. Patel 2-3 weeks after discharge     Acute Renal Failure-resolved   -Cr 0.65 3/9/21.   -Patient has recently elevated creatinine in the range of 1.2-1.4, but in may of 2021 and march of 2021 she has normal creatinine.   -Most likely develop ARF during her admission with GI bleeding and likely in ATN at this time. She received IV contrast twice for CTA head and neck and perfusion,.   -Cr down to 0.94. resolved.   - Avoid NSAID's and nephrotoxic medications.   -bmp at tcu  -start lasix for TR. Follow bmp closely     Chronic Anemia, H/O Recent Upper GI bleeding secondary to Angio ectasia   -Patient appears to have chronic anemia with  recent acute on chronic anemia secondary to GI bleeding because of  duodenal angioectasia s/p clip placement.   -Hb stable 8-9 range.   - Protonix   -follow Hb, recheck at tcu     History of stroke prior old Right frontal, and recently left Cerebellar on 4/8/21, and unable to walk related to left leg weakness Wheel chair bound.   - Monitor.      Benign essential hypertension  -slightly hypertensive  -goal <130/80.   -On Metoprolol 12.5 mg bid at home.   -starting lasix for severe TR to control edema. Follow sbp on this med  If renal function improve in future consider low dose lisinopril.   Consider norvasc but can cause edema which could confound follow up of TR  -follow up pcp after tcu discharge.        S/P AVR & Ascending Aortic Aneurysm repair Stable.  - Monitor.   -follow up with [facility] cardiology in several weeks.      Bilateral Lower Extremity edema and Weakness. She has 1+ LE edema, most likely chronic, per report. US Venous Bilateral LE negative for DVT  - Monitor.      Hypokalemia, Hypernatremia. Hypernatremia likely because of NS, per report.   - Replace potassium as per protocol.  - Monitor and correct.   -follow bmp,     Bilateral foot drop. Uses AFOs, chronic.   PT involved.     Access: picc line placed for abx on 7/5. Remove once complete longterm abx     DVT Prophylaxis: Heparin gtt. Defer to Neuro.      Code Status: No CPR- Do NOT Intubate     Disposition: needs TCU per OT. SW consulted and awaiting placement                 Emil Trevizo MD  Text Page  (7am to 6pm)  Interval History   INR <2. Feels fine. No new issues. Neurologically stable. No sob or cp      -Data reviewed today: I reviewed all new labs and imaging results over the last 24 hours. I personally reviewed labs last 24 hours.     Physical Exam   Temp: 97.8  F (36.6  C) Temp src: Oral BP: 132/75 Pulse: 79   Resp: 16 SpO2: 95 % O2 Device: None (Room air)    Vitals:    06/28/21 0400 06/28/21 1704 07/06/21 0737   Weight:  83.6 kg (184 lb 4.9 oz) 80.1 kg (176 lb 9.4 oz) 72 kg (158 lb 11.7 oz)     Vital Signs with Ranges  Temp:  [97.5  F (36.4  C)-98  F (36.7  C)] 97.8  F (36.6  C)  Pulse:  [66-88] 79  Resp:  [12-16] 16  BP: (120-154)/(66-86) 132/75  SpO2:  [93 %-96 %] 95 %  I/O last 3 completed shifts:  In: 240 [P.O.:240]  Out: 1600 [Urine:1600]    Constitutional: Up in bed  Respiratory: CTAB, breathing easily  Cardiovascular: irreg irreg rhythm. No r/g/m, crisp S2. 1+BLE edema. Trace edema thigh  GI: soft, nt, nd  Skin/Integumen: no rash   Neuro: R facial droop-very subtle, nl speech and mentation. Oriented, strength nl throughout with exception of chronic BLE foot drop. CN 2-12 otherwise intact,   Psych: pleasant,cooperative    Medications     - MEDICATION INSTRUCTIONS -         ampicillin  2 g Intravenous Q6H     apixaban ANTICOAGULANT  5 mg Oral BID     atorvastatin  20 mg Oral QPM     cefTRIAXone  2 g Intravenous Q24H     cyanocobalamin  1,000 mcg Oral Daily     folic acid  2,000 mcg Oral Daily     furosemide  20 mg Oral Daily     gabapentin  200 mg Oral At Bedtime     metoprolol tartrate  25 mg Oral BID     pantoprazole  40 mg Oral BID AC     potassium & sodium phosphates  1 packet Oral or Feeding Tube BID     potassium chloride  20 mEq Oral Once     [START ON 7/7/2021] potassium chloride  20 mEq Oral Once     sodium chloride (PF)  10 mL Intracatheter Q8H     sodium chloride (PF)  3 mL Intracatheter Q8H       Data   Recent Labs   Lab 07/06/21  0513 07/05/21  1658 07/05/21  1602 07/04/21  1534 07/04/21  0859 07/03/21  0805 07/03/21  0805 07/02/21  0906 07/02/21  0906 07/01/21  0712 07/01/21  0712   WBC  --   --   --   --  7.4  --  8.8  --  9.0  --  8.8   HGB  --   --   --   --  9.2*  --  9.2*  --  9.3*  --  8.6*   MCV  --   --   --   --  85  --  84  --  84  --  84   PLT  --   --   --   --  304  --  322  --  296  --  261   INR 1.70*  --  2.18* 2.81*  --   --  2.64*  --  2.04*  --  1.53*     --  143  --  140  --  143  --   147*  --  144   POTASSIUM 3.3* 3.1* 3.1* 3.5 3.4   < > 3.2*   < > 3.3*   < > 3.1*   CHLORIDE 109  --  110*  --  111*  --  113*  --  117*  --  114*   CO2 28  --  27  --  25  --  25  --  25  --  23   BUN 10  --  7  --  7  --  9  --  10  --  10   CR 0.88  --  0.91  --  0.97  --  0.94  --  1.09*  --  1.25*   ANIONGAP 5  --  6  --  4  --  5  --  5  --  7   MANDI 8.1*  --  8.3*  --  8.3*  --  8.4*  --  8.3*  --  8.1*   *  --  108*  --  93  --  88  --  96  --  96   ALBUMIN  --   --   --   --   --   --   --   --  1.9*  --  1.7*   PROTTOTAL  --   --   --   --   --   --   --   --  7.2  --   --    BILITOTAL  --   --   --   --   --   --   --   --  0.3  --   --    ALKPHOS  --   --   --   --   --   --   --   --  79  --   --    ALT  --   --   --   --   --   --   --   --  10  --   --    AST  --   --   --   --   --   --   --   --  13  --   --     < > = values in this interval not displayed.       Imaging:   No results found for this or any previous visit (from the past 24 hour(s)).

## 2021-07-06 NOTE — CONSULTS
Discharge Pharmacy Test Claim    Pt's Medicareblue Part D insurance covers eliquis or xarelto for $25/mo. [facility] has one-time use 30-day free trial vouchers available for either DOAC.      Yvette Vargas  [facility] Liaison  Ph: [phone] Pager: [phone]

## 2021-07-06 NOTE — PROGRESS NOTES
Care Management Follow Up    Length of Stay (days): 10    Expected Discharge Date: 07/06/21     Concerns to be Addressed: discharge planning  [facility]  Patient plan of care discussed at interdisciplinary rounds: Yes    Anticipated Discharge Disposition: Transitional Care     Anticipated Discharge Services:    Anticipated Discharge DME:      Patient/family educated on Medicare website which has current facility and service quality ratings: yes  Education Provided on the Discharge Plan:    Patient/Family in Agreement with the Plan: yes    Referrals Placed by CM/SW:    Private pay costs discussed: transportation costs, if applicable    Additional Information:    SW left  for [facility] TCU asking for call back w/update regarding possible admission. Son reports he has already met /[facility] to complete a Medical Assistance application, if pt should require LTC post TCU.  Son asks that TCU referrals be resent to [facility] (as pt is on their wait list for LTC) and [facility], which was done thru [facility], as these TCU's had originally stated they were full thru the Holiday weekend.      SUZY Sosa   [facility]  [phone]

## 2021-07-07 ENCOUNTER — APPOINTMENT (OUTPATIENT)
Dept: OCCUPATIONAL THERAPY | Facility: CLINIC | Age: 86
DRG: 288 | End: 2021-07-07
Attending: HOSPITALIST
Payer: MEDICARE

## 2021-07-07 ENCOUNTER — APPOINTMENT (OUTPATIENT)
Dept: SPEECH THERAPY | Facility: CLINIC | Age: 86
DRG: 288 | End: 2021-07-07
Attending: HOSPITALIST
Payer: MEDICARE

## 2021-07-07 ENCOUNTER — APPOINTMENT (OUTPATIENT)
Dept: PHYSICAL THERAPY | Facility: CLINIC | Age: 86
DRG: 288 | End: 2021-07-07
Attending: HOSPITALIST
Payer: MEDICARE

## 2021-07-07 PROBLEM — B95.2 BACTEREMIA DUE TO ENTEROCOCCUS: Status: ACTIVE | Noted: 2021-07-07

## 2021-07-07 PROBLEM — R78.81 BACTEREMIA DUE TO ENTEROCOCCUS: Status: ACTIVE | Noted: 2021-07-07

## 2021-07-07 PROBLEM — M21.372 BILATERAL FOOT-DROP: Status: ACTIVE | Noted: 2021-07-07

## 2021-07-07 PROBLEM — I63.9 CARDIOEMBOLIC STROKE (H): Status: ACTIVE | Noted: 2021-07-07

## 2021-07-07 PROBLEM — I07.1 SEVERE TRICUSPID VALVE REGURGITATION: Status: ACTIVE | Noted: 2021-07-07

## 2021-07-07 PROBLEM — M21.371 BILATERAL FOOT-DROP: Status: ACTIVE | Noted: 2021-07-07

## 2021-07-07 PROBLEM — I10 ESSENTIAL HYPERTENSION: Status: ACTIVE | Noted: 2021-07-07

## 2021-07-07 LAB
ANION GAP SERPL CALCULATED.3IONS-SCNC: 3 MMOL/L (ref 3–14)
BACTERIA SPEC CULT: NO GROWTH
BUN SERPL-MCNC: 9 MG/DL (ref 7–30)
CALCIUM SERPL-MCNC: 8.1 MG/DL (ref 8.5–10.1)
CHLORIDE SERPL-SCNC: 110 MMOL/L (ref 94–109)
CO2 SERPL-SCNC: 29 MMOL/L (ref 20–32)
CREAT SERPL-MCNC: 0.82 MG/DL (ref 0.52–1.04)
GFR SERPL CREATININE-BSD FRML MDRD: 64 ML/MIN/{1.73_M2}
GLUCOSE SERPL-MCNC: 94 MG/DL (ref 70–99)
Lab: NORMAL
MAGNESIUM SERPL-MCNC: 1.6 MG/DL (ref 1.6–2.3)
PHOSPHATE SERPL-MCNC: 2.7 MG/DL (ref 2.5–4.5)
POTASSIUM SERPL-SCNC: 3.8 MMOL/L (ref 3.4–5.3)
SODIUM SERPL-SCNC: 142 MMOL/L (ref 133–144)
SPECIMEN SOURCE: NORMAL

## 2021-07-07 PROCEDURE — 97116 GAIT TRAINING THERAPY: CPT | Mod: GP | Performed by: PHYSICAL THERAPIST

## 2021-07-07 PROCEDURE — 99232 SBSQ HOSP IP/OBS MODERATE 35: CPT | Performed by: INTERNAL MEDICINE

## 2021-07-07 PROCEDURE — 80048 BASIC METABOLIC PNL TOTAL CA: CPT | Performed by: INTERNAL MEDICINE

## 2021-07-07 PROCEDURE — 250N000011 HC RX IP 250 OP 636: Performed by: INTERNAL MEDICINE

## 2021-07-07 PROCEDURE — 250N000013 HC RX MED GY IP 250 OP 250 PS 637: Performed by: INTERNAL MEDICINE

## 2021-07-07 PROCEDURE — 999N000190 HC STATISTIC VAT ROUNDS

## 2021-07-07 PROCEDURE — 83735 ASSAY OF MAGNESIUM: CPT | Performed by: INTERNAL MEDICINE

## 2021-07-07 PROCEDURE — 250N000011 HC RX IP 250 OP 636: Performed by: SPECIALIST

## 2021-07-07 PROCEDURE — 92526 ORAL FUNCTION THERAPY: CPT | Mod: GN | Performed by: SPEECH-LANGUAGE PATHOLOGIST

## 2021-07-07 PROCEDURE — 97530 THERAPEUTIC ACTIVITIES: CPT | Mod: GO

## 2021-07-07 PROCEDURE — 84100 ASSAY OF PHOSPHORUS: CPT | Performed by: INTERNAL MEDICINE

## 2021-07-07 PROCEDURE — 120N000001 HC R&B MED SURG/OB

## 2021-07-07 PROCEDURE — 999N000040 HC STATISTIC CONSULT NO CHARGE VASC ACCESS

## 2021-07-07 PROCEDURE — 97530 THERAPEUTIC ACTIVITIES: CPT | Mod: GP | Performed by: PHYSICAL THERAPIST

## 2021-07-07 RX ORDER — POTASSIUM CHLORIDE 1500 MG/1
20 TABLET, EXTENDED RELEASE ORAL DAILY
DISCHARGE
Start: 2021-07-08 | End: 2021-07-08

## 2021-07-07 RX ORDER — PANTOPRAZOLE SODIUM 40 MG/1
40 TABLET, DELAYED RELEASE ORAL
DISCHARGE
Start: 2021-07-08 | End: 2021-07-22

## 2021-07-07 RX ORDER — FUROSEMIDE 20 MG
20 TABLET ORAL DAILY
DISCHARGE
Start: 2021-07-08

## 2021-07-07 RX ORDER — POTASSIUM CHLORIDE 1500 MG/1
20 TABLET, EXTENDED RELEASE ORAL DAILY
Status: DISCONTINUED | OUTPATIENT
Start: 2021-07-07 | End: 2021-07-08 | Stop reason: HOSPADM

## 2021-07-07 RX ADMIN — ATORVASTATIN CALCIUM 20 MG: 20 TABLET, FILM COATED ORAL at 19:58

## 2021-07-07 RX ADMIN — METOPROLOL TARTRATE 25 MG: 25 TABLET, FILM COATED ORAL at 19:58

## 2021-07-07 RX ADMIN — AMPICILLIN SODIUM 2 G: 2 INJECTION, POWDER, FOR SOLUTION INTRAVENOUS at 06:23

## 2021-07-07 RX ADMIN — AMPICILLIN SODIUM 2 G: 2 INJECTION, POWDER, FOR SOLUTION INTRAVENOUS at 17:44

## 2021-07-07 RX ADMIN — PANTOPRAZOLE SODIUM 40 MG: 40 TABLET, DELAYED RELEASE ORAL at 15:57

## 2021-07-07 RX ADMIN — CEFTRIAXONE SODIUM 2 G: 2 INJECTION, POWDER, FOR SOLUTION INTRAMUSCULAR; INTRAVENOUS at 13:56

## 2021-07-07 RX ADMIN — METOPROLOL TARTRATE 25 MG: 25 TABLET, FILM COATED ORAL at 11:04

## 2021-07-07 RX ADMIN — PANTOPRAZOLE SODIUM 40 MG: 40 TABLET, DELAYED RELEASE ORAL at 08:42

## 2021-07-07 RX ADMIN — AMPICILLIN SODIUM 2 G: 2 INJECTION, POWDER, FOR SOLUTION INTRAVENOUS at 00:51

## 2021-07-07 RX ADMIN — APIXABAN 5 MG: 5 TABLET, FILM COATED ORAL at 19:59

## 2021-07-07 RX ADMIN — CYANOCOBALAMIN TAB 1000 MCG 1000 MCG: 1000 TAB at 11:04

## 2021-07-07 RX ADMIN — POTASSIUM CHLORIDE 20 MEQ: 1500 TABLET, EXTENDED RELEASE ORAL at 00:51

## 2021-07-07 RX ADMIN — ONDANSETRON 4 MG: 2 INJECTION INTRAMUSCULAR; INTRAVENOUS at 08:36

## 2021-07-07 RX ADMIN — APIXABAN 5 MG: 5 TABLET, FILM COATED ORAL at 11:03

## 2021-07-07 RX ADMIN — AMPICILLIN SODIUM 2 G: 2 INJECTION, POWDER, FOR SOLUTION INTRAVENOUS at 23:55

## 2021-07-07 RX ADMIN — FUROSEMIDE 20 MG: 20 TABLET ORAL at 11:04

## 2021-07-07 RX ADMIN — AMPICILLIN SODIUM 2 G: 2 INJECTION, POWDER, FOR SOLUTION INTRAVENOUS at 12:14

## 2021-07-07 RX ADMIN — POTASSIUM CHLORIDE 20 MEQ: 1500 TABLET, EXTENDED RELEASE ORAL at 11:03

## 2021-07-07 RX ADMIN — FOLIC ACID 2000 MCG: 1 TABLET ORAL at 11:02

## 2021-07-07 ASSESSMENT — ACTIVITIES OF DAILY LIVING (ADL)
ADLS_ACUITY_SCORE: 27
ADLS_ACUITY_SCORE: 23
ADLS_ACUITY_SCORE: 22
ADLS_ACUITY_SCORE: 27

## 2021-07-07 NOTE — PROGRESS NOTES
Care Management Follow Up    Length of Stay (days): 11    Expected Discharge Date: 07/07/21(TCU)     Concerns to be Addressed: discharge planning   Patient plan of care discussed at interdisciplinary rounds: Yes    Anticipated Discharge Disposition: Transitional Care     Anticipated Discharge Services:    Anticipated Discharge DME:      Patient/family educated on Medicare website which has current facility and service quality ratings: yes  Education Provided on the Discharge Plan:    Patient/Family in Agreement with the Plan: yes    Referrals Placed by CM/SW:  TCU  Private pay costs discussed: transportation costs, if applicable    Additional Information:    SW spoke w/[facility] admissions ([facility] ) who stated they are willing to re-review pt for a possible admission tomorrow into their TCU (no beds available today). Per [facility], they could not accept pt as a  MA pending for LTC, which is hope of the son, but stated their SW would assist family in finding an open LTC bed somewhere within the [place] prior to the end of pt's TCU stay. SW updated pt and her daughters (in room) who agree with plan and are also aware pt has only 39 days remaining in this Medicare TCU benefit period, thus they may need to pay privately for a few days while awaiting the 6 weeks of IV to be completed.   Dtrs will update pt's sonBonifacio.         Irma Huerta [employer]  [phone]    UPDATE@5885: [facility] RN has specific questions she will review w/Critical access hospital staff RN; will call SW back w/final approval.     UPDATE@6597: Pt has been accepted at [facility] for tomorrow 7/8, requesting a ride around 1pm. HAKEEM updated staff.     UPDATE@1437: Per sonBonifacio, they prefer to transport pt to the TCU tomorrow at 1pm. Son also reports pt has a BCBS Senior Gold supplement. HAKEEM emailed Critical access hospital Financial counselor to research this, as pt's cards were provided to registration at   [facility] where pt transferred from. [facility] told son she will reach out to the staff at [facility] also to update them w/info on pt's MA status.     HAKEEM updated TCU Karen FIELD () who asked that orders be faxed by 12 pm tomorrow to: [phone]

## 2021-07-07 NOTE — PROGRESS NOTES
[facility]    Infectious Disease Progress Note    Date of Service: 07/07/2021     Assessment :  1.  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 TTE & MARK ANTHONY without evidence of endocarditis but will need to treat as such. Blood cx negative since 7/1  2.  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  3.  History of recurrent urinary infections have included some systemic symptoms intermittently, never any true lower urinary symptoms and her cultures all grew mixed eleni.  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  5  Chronic renal insufficiency.  6.  Atrial fibrillation.     Recommendations :     1.  IV ampicillin + ceftriaxone   2. PICC in anticipation of long term IV antibiotics for 6 weeks until 8/12/2021 orders all in  3. Will need weekly labs and ID FUP with  Dr. Patel 2-3 weeks after discharge  Disposition planning underway      Daniel Stephens MD    Interval History   Resting . Has no complaints today . Tolerating antibiotics without side effects    Physical Exam   Temp: 98.3  F (36.8  C) Temp src: Oral BP: 125/76 Pulse: 81   Resp: 16 SpO2: 93 % O2 Device: None (Room air)    Vitals:    06/28/21 0400 06/28/21 1704 07/06/21 0737   Weight: 83.6 kg (184 lb 4.9 oz) 80.1 kg (176 lb 9.4 oz) 72 kg (158 lb 11.7 oz)     Vital Signs with Ranges  Temp:  [97.8  F (36.6  C)-98.4  F (36.9  C)] 98.3  F (36.8  C)  Pulse:  [66-81] 81  Resp:  [14-16] 16  BP: (119-143)/(66-77) 125/76  SpO2:  [93 %-97 %] 93 %    Constitutional: Awake, alert, cooperative, no apparent distress  Lungs: lear to auscultation bilaterally  Card, RRR  Abdomen : Soft and non tender  Skin: No rashes, no cyanosis, no edema    Other:    Medications     - MEDICATION INSTRUCTIONS -         ampicillin  2 g Intravenous Q6H     apixaban ANTICOAGULANT   5 mg Oral BID     atorvastatin  20 mg Oral QPM     cefTRIAXone  2 g Intravenous Q24H     cyanocobalamin  1,000 mcg Oral Daily     folic acid  2,000 mcg Oral Daily     furosemide  20 mg Oral Daily     metoprolol tartrate  25 mg Oral BID     pantoprazole  40 mg Oral BID AC     potassium chloride  20 mEq Oral Daily     sodium chloride (PF)  10 mL Intracatheter Q8H     sodium chloride (PF)  3 mL Intracatheter Q8H       Data   All microbiology laboratory data reviewed.  Recent Labs   Lab Test 07/04/21  0859 07/03/21  0805 07/02/21  0906   WBC 7.4 8.8 9.0   HGB 9.2* 9.2* 9.3*   HCT 30.8* 29.9* 30.3*   MCV 85 84 84    322 296     Recent Labs   Lab Test 07/07/21  0630 07/06/21  0513 07/05/21  1602   CR 0.82 0.88 0.91     No lab results found.  Recent Labs   Lab Test 07/03/21  1405 07/03/21  1359 07/01/21  0852 06/30/21  1316 06/28/21  1220   CULT No growth after 4 days No growth after 4 days No growth No growth  Cultured on the 3rd day of incubation:  Enterococcus faecalis  Susceptibility testing done on previous specimen  *  Critical Value/Significant Value called to and read back by  Gill Bhakta RN @2532 7/03/2021 gd   Cultured on the 2nd day of incubation:  Enterococcus faecalis  Susceptibility testing done on previous specimen  *  Critical Value/Significant Value, preliminary result only, called to and read back by  LUIS M MENJIVAR, RN 9032 6.30.21 NDP    Cultured on the 1st day of incubation:  Enterococcus faecalis  *  Critical Value/Significant Value, preliminary result only, called to and read back by  ANTHONY BERRY RN @5295 6.29.21 LM    (Note)  POSITIVE for ENTEROCOCCUS FAECALIS and NEGATIVE for Star/vanB genes  by Digifeye multiplex nucleic acid test. Final identification and  antimicrobial susceptibility testing will be verified by standard  methods.    Specimen tested with Verigene multiplex, gram-positive blood culture  nucleic acid test for the following targets: Staph aureus,  Staph  epidermidis, Staph lugdunensis, other Staph species, Enterococcus  faecalis, Enterococcus faecium, Streptococcus species, S. agalactiae,  S. anginosus grp., S. pneumoniae, S. pyogenes, Listeria sp., mecA  (methicillin resistance) and Star/B (vancomycin resistance).    Critical Value/Significant Value called to and read back by  Veena Vogel RN 6/30/21 @ 0045 TF     Susceptibility              Enterococcus faecalis       REUBEN       AMPICILLIN <=2 ug/mL Sensitive       Gentamicin Screen   Sensitive1       PENICILLIN 2 ug/mL Sensitive       VANCOMYCIN 1 ug/mL Sensitive          7/5 MARK ANTHONY  Interpretation Summary     1. The left ventricle is normal in structure, function and size. The visual  ejection fraction is estimated at 60%.  2. The right ventricle is mild to moderately dilated. The right ventricular  systolic function is normal.  3. There is mod-severe to severe (3-4+) tricuspid regurgitation. Small area of  failure to coapt of TV.  4. The right ventricular systolic pressure is approximated at 39mmHg plus the  right atrial pressure.  5. s/p tissue AVR, implanted 2016. Valve leaflets open well, no thrombus or  vegetation.     Echo from 6-28-21 showed EF 60%, mild RV dilation, 2+ TR.  Attestation:  Total time on the floor involved in the patient's care: 25 minutes. Total time spent in counseling/care coordination: >50%

## 2021-07-07 NOTE — PLAN OF CARE
A&Ox4, Belkofski. VSS on RA. Tele Afib CVR. T/R A2. Purewick for incontinence, not replaced in AM d/t redness and breakdown, barrier cream applied. Neuros intact ex LLE 3.5/5 strength. LUE and BLE edema and chronic bilat foot drop. Denies pain, nausea, SOB. RUE PICC, blood at insertion site this morning, please page VAT to readdress. SL ex intmt abx. Lytes drawn this morning, recheck pending. Mepilex on coccyx for blanchable redness. Continue to monitor.

## 2021-07-07 NOTE — PLAN OF CARE
Pt here with acute ischemic stroke. A&O. Neuros intact ex for slight weakness in RUE. VSS. Tele Afib CVR. DD2 diet, thin liquids. Takes pills whole with water. Up with lift assistance, utilizes wheelchair at baseline.  Incontinent of bladder and bowel. Denies pain. PICC dressing saturated, per IV team will reassess tomorrow. Pt scoring green on the Aggression Stop Light Tool. Discharge to TCU tomorrow.

## 2021-07-07 NOTE — PLAN OF CARE
Acute ischemic strokes. Neuros/CMS - alert & oriented, following commands, right grasp slightly weaker than left/slight drift noted upper extremity; vision deficits - glasses helpful; edema in bilateral lower extremities; decreased mobility - 2 assist to turn & reposition. VSS, room air. Tele - SD 80s. Regular diet/pills whole with water/set up for meals& & encouraged.  Bedrest & turned often. Incontinent of urine/no bm today. Denies pain. Pt scoring green on the Aggression Stop Light Tool. Redness in cami area/purwik discontinued/barrier cream applied & sacral mepilex. PICC right arm/dressing saturated/monitored by IV team today/accessible.

## 2021-07-07 NOTE — PROGRESS NOTES
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 ER this AM after waking up with new right face droop, and transferred to [facility]     Acute Ischemic Stroke likely cardioembolic due to Atrial fibrillation   -This is a 89 year old female, history of Afib not recently on Eliquis because of recent GI bleeding, presented with right facial droop,   - Initial CT head negative for acute stroke, CTA head and neck done shows new filling defect within the basilar artery, concerning for partially occlusive thrombus.No evidence of significant stenosis in either internal carotid arteries.  - No invasive intervention recommended by the Neurology Repeat CT head shows Possible subacute ischemic infarct in the right frontal and left occipital lobe, CTA head shows nonocclusive thrombus at the proxima basilar artery remain stable.  - MRI of the brain shows acute/early subacute infarct at the right joey/medullary junction No other significant past medical history or family history. No hemorrhage.   - Neurology.   - MARK ANTHONY discussed with patient family and cardiology. Family considering risk/benefit. From the neurological and ID perspective, they are desiring MARK ANTHONY to evaluate for endocarditis with direct valvular involvement, and which point there may be less benefit for anticoagulant  -neurology suspect afib rather than endocarditis as culprit, however given high likelilhood of endocarditis risk for bleeding high on anticoagulation. Neurology recommending MARK ANTHONY.   -Neurology recommending risk for intraluminal thrombus in basilar artery and hx afib and potential bleeding risk with  possible endocarditis, risk benefit ratio of anticoagulation considered by team. Given known thrombus neurology recommending continued anticoagulation with heparin gtt. Low threshhold to image brain if change in mental status.   -neurology stable. Had some nausea earlier  R facial droop. Has chronic bilateral foot drop  Updated pt's son 7/4  -7/3: routine repeat CTA showed resolution of thrombus  Repeat MRI Brain showed expected evolution of prior stroke, hold left occipital, right frontal and L pontine infarcts    -sbp seems improved with increase in metoprolol to 25mg bid.   -doing well. Stable neurologically, facial droop nearly resolved.   -coumadin held and INR followed, pt started on heparin gtt. INR increased to goal of 2-3. Heparin gtt stopped.     -MARK ANTHONY 7/5 negative for thrombus or vegitation  1. The left ventricle is normal in structure, function and size. The visual  ejection fraction is estimated at 60%.  2. The right ventricle is mild to moderately dilated. The right ventricular  systolic function is normal.  3. There is mod-severe to severe (3-4+) tricuspid regurgitation. Small area of  failure to coapt of TV.  4. The right ventricular systolic pressure is approximated at 39mmHg plus the  right atrial pressure.  5. s/p tissue AVR, implanted 2016. Valve leaflets open well, no thrombus or  vegetation.     -per stroke neurology, stroke etiology likely cardioembolic 2/2 to atrial fibrillation.   -neurologically stable. Very subtle R facial droop persists. O/w nonfocal neuro exam this stay  -INR drifted down to <2 on 7/6, pre neurology start Eliquis  -blood pressure at goal  -doing well. Waiting on placement at tcu    Plan:   -abx per ID-see note  -q4 hour neuro checks  -goal sbp <130/80-pt   -cont statin 20mg every day. Lipid panel 5 weeks  - Neurologic monitoring.   - Therapies following.   -Continue decreased dose atorvastatin 20 mg daily in light of LDL 31 and increased risk of ICH in LDL<40  - Follow up  with Stroke Neurology with repeat CTA (ordered for you) in 1-2 months  - TCU          Permanent atrial fib She is on Metoprolol,   -rate controlled.   - oral Metoprolol - increased dose to 25mg bid to improve htn control  - Neurology managing anticoagulation.   -follow up primary cardiologist    Moderate to severe TR (3-4+)  -no signs RV failure  -RV mild to moderate dilation  -has ~1+ bLE edema to thigh  Plan;   Follow for LE edema  Have pt follow up with cardiology   start lasix 20mg every day with Kdur every day   -nl bmp today  -follow bmp at tcu  -      Possible Community acquired Pneumonia vs Aspiration Pneumonia.   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   -TTE - no evidence of endocarditis.   - Ampicillin. Ceftriaxone. Defer to ID.   - ID following.   - Repeat blood culture on 6/29 positive, ID following.   - MARK ANTHONY ordered, however, cardiology requesting GI consult.   - GI consult at Cards request.   -iv ampicillin and ceftriaxone per ID  - follow blood cx: 6/28 +enterooccus 2/2, 6/30 +1/2, 7/1 ngtd, 7/3 ngtd  -MARK ANTHONY Monday  - PICC placed 7/5 in anticipation of long term IV antibiotics for 6 weeks until 8/12/2021  -Will need weekly labs and ID FUP with  Dr. Patel 2-3 weeks after discharge     Acute Renal Failure-resolved   -Cr 0.65 3/9/21.   -Patient has recently elevated creatinine in the range of 1.2-1.4, but in may of 2021 and march of 2021 she has normal creatinine.   -Most likely develop ARF during her admission with GI bleeding and likely in ATN at this time. She received IV contrast twice for CTA head and neck and perfusion,.   -Cr down to 0.94. resolved.   - Avoid NSAID's and nephrotoxic medications.   -bmp at tcu  -start lasix (with Kdur) for TR. Follow bmp  closely     Chronic Anemia, H/O Recent Upper GI bleeding secondary to Angio ectasia   -Patient appears to have chronic anemia with recent acute on chronic anemia secondary to GI bleeding because of  duodenal angioectasia s/p clip placement.   -Hb stable 8-9 range.   - Protonix   -follow Hb, recheck at tcu     History of stroke prior old Right frontal, and recently left Cerebellar on 4/8/21, and unable to walk related to left leg weakness Wheel chair bound.   - Monitor.      Benign essential hypertension  -slightly hypertensive  -goal <130/80.   -On Metoprolol 12.5 mg bid at home.   -starting lasix for severe TR to control edema. Follow sbp on this med  -avoid norvasc as can cause edema which could confound follow up of TR  -follow up pcp after tcu discharge.   -see how she does on addition of lasix for LE edema and TR.   -currently sbp at goal.        S/P AVR & Ascending Aortic Aneurysm repair Stable.  - Monitor.   -follow up with [facility] cardiology in several weeks.      Bilateral Lower Extremity edema and Weakness. She has 1+ LE edema, most likely chronic, per report. US Venous Bilateral LE negative for DVT  - Monitor.   -MARK ANTHONY showed mod-severe TR.   - will start lasix 20mg every day and Kdur.   - bmp am and at tcu     Hypokalemia, Hypernatremia-resolved.    Hypernatremia likely because of NS, per report.   - Replace potassium as per protocol.  - Monitor and correct.   -follow bmp,     Bilateral foot drop. Uses AFOs, chronic.   PT involved.     Access: picc line placed for abx on 7/5. Remove once complete longterm abx     DVT Prophylaxis: Heparin gtt. Defer to Neuro.      Code Status: No CPR- Do NOT Intubate     Disposition: needs TCU per OT. SW consulted and awaiting placement  Possibly tomorrow. Pt ready for discahrge                 Emil Trevizo MD  Text Page  (7am to 6pm)  Interval History   Stable. No new neuro sxs.   No cp or sob.           -Data reviewed today: I reviewed all new labs and  imaging results over the last 24 hours. I personally reviewed labs last 24 hours.     Physical Exam   Temp: 98  F (36.7  C) Temp src: Oral BP: 110/54 Pulse: 85   Resp: 16 SpO2: 95 % O2 Device: None (Room air)    Vitals:    06/28/21 0400 06/28/21 1704 07/06/21 0737   Weight: 83.6 kg (184 lb 4.9 oz) 80.1 kg (176 lb 9.4 oz) 72 kg (158 lb 11.7 oz)     Vital Signs with Ranges  Temp:  [98  F (36.7  C)-98.4  F (36.9  C)] 98  F (36.7  C)  Pulse:  [66-85] 85  Resp:  [14-16] 16  BP: (110-143)/(54-77) 110/54  SpO2:  [93 %-97 %] 95 %  I/O last 3 completed shifts:  In: 120 [P.O.:120]  Out: 1700 [Urine:1700]    Constitutional: in bed  Respiratory: CTAB, breathing easily  Cardiovascular: irreg irreg rhythm. No r/g/m, crisp S2. ~1+BLE edema.   GI: soft, nt, nd  Skin/Integumen: no rash   Neuro: R facial droop-very subtle, nl speech and mentation. Oriented, strength nl throughout with exception of chronic BLE foot drop. CN 2-12 otherwise intact,   Psych: pleasant,cooperative    Medications     - MEDICATION INSTRUCTIONS -         ampicillin  2 g Intravenous Q6H     apixaban ANTICOAGULANT  5 mg Oral BID     atorvastatin  20 mg Oral QPM     cefTRIAXone  2 g Intravenous Q24H     cyanocobalamin  1,000 mcg Oral Daily     folic acid  2,000 mcg Oral Daily     furosemide  20 mg Oral Daily     metoprolol tartrate  25 mg Oral BID     pantoprazole  40 mg Oral BID AC     potassium chloride  20 mEq Oral Daily     sodium chloride (PF)  10 mL Intracatheter Q8H     sodium chloride (PF)  3 mL Intracatheter Q8H       Data   Recent Labs   Lab 07/07/21  0630 07/06/21  1855 07/06/21  0513 07/05/21  1602 07/05/21  1602 07/04/21  1534 07/04/21  0859 07/03/21  0805 07/03/21  0805 07/02/21  0906 07/02/21  0906 07/01/21 0712 07/01/21 0712   WBC  --   --   --   --   --   --  7.4  --  8.8  --  9.0  --  8.8   HGB  --   --   --   --   --   --  9.2*  --  9.2*  --  9.3*  --  8.6*   MCV  --   --   --   --   --   --  85  --  84  --  84  --  84   PLT  --   --   --    --   --   --  304  --  322  --  296  --  261   INR  --   --  1.70*  --  2.18* 2.81*  --   --  2.64*  --  2.04*  --  1.53*     --  142  --  143  --  140  --  143  --  147*  --  144   POTASSIUM 3.8 3.8 3.3*   < > 3.1* 3.5 3.4   < > 3.2*   < > 3.3*   < > 3.1*   CHLORIDE 110*  --  109  --  110*  --  111*  --  113*  --  117*  --  114*   CO2 29  --  28  --  27  --  25  --  25  --  25  --  23   BUN 9  --  10  --  7  --  7  --  9  --  10  --  10   CR 0.82  --  0.88  --  0.91  --  0.97  --  0.94  --  1.09*  --  1.25*   ANIONGAP 3  --  5  --  6  --  4  --  5  --  5  --  7   MANDI 8.1*  --  8.1*  --  8.3*  --  8.3*  --  8.4*  --  8.3*  --  8.1*   GLC 94  --  111*  --  108*  --  93  --  88  --  96  --  96   ALBUMIN  --   --   --   --   --   --   --   --   --   --  1.9*  --  1.7*   PROTTOTAL  --   --   --   --   --   --   --   --   --   --  7.2  --   --    BILITOTAL  --   --   --   --   --   --   --   --   --   --  0.3  --   --    ALKPHOS  --   --   --   --   --   --   --   --   --   --  79  --   --    ALT  --   --   --   --   --   --   --   --   --   --  10  --   --    AST  --   --   --   --   --   --   --   --   --   --  13  --   --     < > = values in this interval not displayed.       Imaging:   No results found for this or any previous visit (from the past 24 hour(s)).

## 2021-07-08 ENCOUNTER — APPOINTMENT (OUTPATIENT)
Dept: PHYSICAL THERAPY | Facility: CLINIC | Age: 86
DRG: 288 | End: 2021-07-08
Attending: HOSPITALIST
Payer: MEDICARE

## 2021-07-08 ENCOUNTER — RECORDS - HEALTHEAST (OUTPATIENT)
Dept: LAB | Facility: CLINIC | Age: 86
End: 2021-07-08

## 2021-07-08 VITALS
BODY MASS INDEX: 29.97 KG/M2 | OXYGEN SATURATION: 95 % | DIASTOLIC BLOOD PRESSURE: 75 MMHG | HEIGHT: 61 IN | RESPIRATION RATE: 16 BRPM | TEMPERATURE: 97.8 F | HEART RATE: 80 BPM | WEIGHT: 158.73 LBS | SYSTOLIC BLOOD PRESSURE: 149 MMHG

## 2021-07-08 LAB
ANION GAP SERPL CALCULATED.3IONS-SCNC: 5 MMOL/L (ref 3–14)
BUN SERPL-MCNC: 10 MG/DL (ref 7–30)
CALCIUM SERPL-MCNC: 8.5 MG/DL (ref 8.5–10.1)
CHLORIDE SERPL-SCNC: 108 MMOL/L (ref 94–109)
CO2 SERPL-SCNC: 27 MMOL/L (ref 20–32)
CREAT SERPL-MCNC: 0.76 MG/DL (ref 0.52–1.04)
GFR SERPL CREATININE-BSD FRML MDRD: 69 ML/MIN/{1.73_M2}
GLUCOSE SERPL-MCNC: 102 MG/DL (ref 70–99)
MAGNESIUM SERPL-MCNC: 1.7 MG/DL (ref 1.6–2.3)
PHOSPHATE SERPL-MCNC: 2.4 MG/DL (ref 2.5–4.5)
POTASSIUM SERPL-SCNC: 3.4 MMOL/L (ref 3.4–5.3)
SODIUM SERPL-SCNC: 140 MMOL/L (ref 133–144)

## 2021-07-08 PROCEDURE — 250N000013 HC RX MED GY IP 250 OP 250 PS 637: Performed by: INTERNAL MEDICINE

## 2021-07-08 PROCEDURE — 84100 ASSAY OF PHOSPHORUS: CPT | Performed by: INTERNAL MEDICINE

## 2021-07-08 PROCEDURE — 83735 ASSAY OF MAGNESIUM: CPT | Performed by: INTERNAL MEDICINE

## 2021-07-08 PROCEDURE — 80048 BASIC METABOLIC PNL TOTAL CA: CPT | Performed by: INTERNAL MEDICINE

## 2021-07-08 PROCEDURE — 999N000190 HC STATISTIC VAT ROUNDS

## 2021-07-08 PROCEDURE — 99239 HOSP IP/OBS DSCHRG MGMT >30: CPT | Performed by: HOSPITALIST

## 2021-07-08 PROCEDURE — 250N000011 HC RX IP 250 OP 636: Performed by: INTERNAL MEDICINE

## 2021-07-08 PROCEDURE — 97116 GAIT TRAINING THERAPY: CPT | Mod: GP | Performed by: PHYSICAL THERAPIST

## 2021-07-08 PROCEDURE — 250N000013 HC RX MED GY IP 250 OP 250 PS 637: Performed by: HOSPITALIST

## 2021-07-08 PROCEDURE — 97530 THERAPEUTIC ACTIVITIES: CPT | Mod: GP | Performed by: PHYSICAL THERAPIST

## 2021-07-08 RX ORDER — POTASSIUM CHLORIDE 1500 MG/1
40 TABLET, EXTENDED RELEASE ORAL DAILY
DISCHARGE
Start: 2021-07-08

## 2021-07-08 RX ORDER — POTASSIUM CHLORIDE 1500 MG/1
40 TABLET, EXTENDED RELEASE ORAL ONCE
Status: COMPLETED | OUTPATIENT
Start: 2021-07-08 | End: 2021-07-08

## 2021-07-08 RX ADMIN — POTASSIUM CHLORIDE 20 MEQ: 1500 TABLET, EXTENDED RELEASE ORAL at 10:12

## 2021-07-08 RX ADMIN — POTASSIUM CHLORIDE 40 MEQ: 1500 TABLET, EXTENDED RELEASE ORAL at 10:10

## 2021-07-08 RX ADMIN — METOPROLOL TARTRATE 25 MG: 25 TABLET, FILM COATED ORAL at 10:12

## 2021-07-08 RX ADMIN — AMPICILLIN SODIUM 2 G: 2 INJECTION, POWDER, FOR SOLUTION INTRAVENOUS at 06:26

## 2021-07-08 RX ADMIN — APIXABAN 5 MG: 5 TABLET, FILM COATED ORAL at 10:12

## 2021-07-08 RX ADMIN — PANTOPRAZOLE SODIUM 40 MG: 40 TABLET, DELAYED RELEASE ORAL at 06:40

## 2021-07-08 RX ADMIN — CYANOCOBALAMIN TAB 1000 MCG 1000 MCG: 1000 TAB at 10:12

## 2021-07-08 RX ADMIN — AMPICILLIN SODIUM 2 G: 2 INJECTION, POWDER, FOR SOLUTION INTRAVENOUS at 12:22

## 2021-07-08 RX ADMIN — FUROSEMIDE 20 MG: 20 TABLET ORAL at 10:11

## 2021-07-08 RX ADMIN — POTASSIUM & SODIUM PHOSPHATES POWDER PACK 280-160-250 MG 1 PACKET: 280-160-250 PACK at 10:11

## 2021-07-08 RX ADMIN — FOLIC ACID 2000 MCG: 1 TABLET ORAL at 10:12

## 2021-07-08 ASSESSMENT — ACTIVITIES OF DAILY LIVING (ADL)
ADLS_ACUITY_SCORE: 23
ADLS_ACUITY_SCORE: 22
ADLS_ACUITY_SCORE: 23
ADLS_ACUITY_SCORE: 23

## 2021-07-08 NOTE — PROGRESS NOTES
Care Management Follow Up    Length of Stay (days): 12    Expected Discharge Date: 07/08/21     Concerns to be Addressed: discharge planning  pt has been at [facility] [place]  Patient plan of care discussed at interdisciplinary rounds: Yes    Anticipated Discharge Disposition: Transitional Care     Anticipated Discharge Services:    Anticipated Discharge DME:      Patient/family educated on Medicare website which has current facility and service quality ratings: yes  Education Provided on the Discharge Plan:    Patient/Family in Agreement with the Plan: yes    Referrals Placed by CM/SW:    Private pay costs discussed: Not applicable    Additional Information:    [facility] [place] [facility] calls to confirm admission today (Ifeoma ). Son to transport at 1pm. Orders needed by noon to fax to: [phone].  Charge RN paged physician for orders.       SUZY Sosa   [facility]  [phone]

## 2021-07-08 NOTE — PLAN OF CARE
Stroke.  Neuros - alert & oriented; following commands; denies numbness or tingling; letting her needs be known; vision deficits - glasses helpful/no double vision or dizziness; baseline foot drop bilaterally; right upper extremity slightly weaker than left. VSS, room air. Tele A fib CVR.  MB0atrt/ snacks tolerated - small appetite/pills whole with water. Up with 2 assist/turned & repositioned often. Incontinent of bowel and bladder/loose stools. Garima skin red/barrier cream applied/mepilex on coccyx. Denies pain. Electrolytes replaced/orders active. Pt scoring green on the Aggression Stop Light Tool. Discharged to TCU at 1345 via family transport.  Patient agrees with discharge plan. No unmet needs upon discharge. AVS reviewed with son & daughter/copies provided. Right PICC patent for long term antibiotic use/dressing intact/gauze saturated.

## 2021-07-08 NOTE — PROGRESS NOTES
Care Management Discharge Note    Discharge Date: 07/08/21       Discharge Disposition: Transitional Care- [facility]    Discharge Services:      Discharge DME: Other (see comment)(Per PT rec)    Discharge Transportation: family or friend will provide    Private pay costs discussed: Not applicable    PAS Confirmation Code: [id number]  Patient/family educated on Medicare website which has current facility and service quality ratings: no    Education Provided on the Discharge Plan:    Persons Notified of Discharge Plans: Yes  Patient/Family in Agreement with the Plan: yes    Handoff Referral Completed: No    Additional Information:    Orders and PAS were faxed to facility. No further SW interventions anticipated at this time.  PAS-RR    D: Per DHS regulation, SW completed and submitted PAS-RR to [place] [employer] Direct Connect via the Senior LinkAge Line.  PAS-RR confirmation # is : [id number]    P: Further questions may be directed to Senior LinkAge Line at #[phone], option #4 for PAS-RR staff.          SUZY Sosa   [facility]  [phone]

## 2021-07-08 NOTE — PROGRESS NOTES
[facility]    Infectious Disease Progress Note    Date of Service: 07/08/2021     Assessment :  1.  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 TTE & MARK ANTHONY without evidence of endocarditis but will need to treat as such. Blood cx negative since 7/1  2.  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  3.  History of recurrent urinary infections have included some systemic symptoms intermittently, never any true lower urinary symptoms and her cultures all grew mixed eleni.  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  5  Chronic renal insufficiency.  6.  Atrial fibrillation.     Recommendations :     1.  IV ampicillin + ceftriaxone   2. PICC in anticipation of long term IV antibiotics for 6 weeks until 8/12/2021 orders all in  3. Will need weekly labs and ID FUP with  Dr. Patel 2-3 weeks after discharge  Disposition planning , awaiting placement      Daniel Stephens MD    Interval History   Resting . Has no complaints today . Tolerating antibiotics without side effects    Physical Exam   Temp: 97.8  F (36.6  C) Temp src: Oral BP: (!) 149/75 Pulse: 80   Resp: 16 SpO2: 95 % O2 Device: None (Room air)    Vitals:    06/28/21 0400 06/28/21 1704 07/06/21 0737   Weight: 83.6 kg (184 lb 4.9 oz) 80.1 kg (176 lb 9.4 oz) 72 kg (158 lb 11.7 oz)     Vital Signs with Ranges  Temp:  [97.8  F (36.6  C)-98.4  F (36.9  C)] 97.8  F (36.6  C)  Pulse:  [60-85] 80  Resp:  [15-18] 16  BP: (110-149)/(54-77) 149/75  SpO2:  [94 %-96 %] 95 %    Constitutional: Awake, alert, cooperative, no apparent distress  Lungs: lear to auscultation bilaterally  Card, RRR  Abdomen : Soft and non tender  Skin: No rashes, no cyanosis, no edema    Other:    Medications     - MEDICATION INSTRUCTIONS -         ampicillin  2 g Intravenous Q6H     apixaban  ANTICOAGULANT  5 mg Oral BID     atorvastatin  20 mg Oral QPM     cefTRIAXone  2 g Intravenous Q24H     cyanocobalamin  1,000 mcg Oral Daily     folic acid  2,000 mcg Oral Daily     furosemide  20 mg Oral Daily     metoprolol tartrate  25 mg Oral BID     pantoprazole  40 mg Oral BID AC     potassium chloride  20 mEq Oral Daily     sodium chloride (PF)  10 mL Intracatheter Q8H     sodium chloride (PF)  3 mL Intracatheter Q8H       Data   All microbiology laboratory data reviewed.  Recent Labs   Lab Test 07/04/21  0859 07/03/21  0805 07/02/21  0906   WBC 7.4 8.8 9.0   HGB 9.2* 9.2* 9.3*   HCT 30.8* 29.9* 30.3*   MCV 85 84 84    322 296     Recent Labs   Lab Test 07/08/21  0625 07/07/21  0630 07/06/21  0513   CR 0.76 0.82 0.88     No lab results found.  Recent Labs   Lab Test 07/03/21  1405 07/03/21  1359 07/01/21  0852 06/30/21  1316 06/28/21  1220   CULT No growth after 5 days No growth after 5 days No growth No growth  Cultured on the 3rd day of incubation:  Enterococcus faecalis  Susceptibility testing done on previous specimen  *  Critical Value/Significant Value called to and read back by  Gill Bhakta RN @5954 7/03/2021 gd   Cultured on the 2nd day of incubation:  Enterococcus faecalis  Susceptibility testing done on previous specimen  *  Critical Value/Significant Value, preliminary result only, called to and read back by  LUIS M MENJIVAR, RN 5358 6.30.21 NDP    Cultured on the 1st day of incubation:  Enterococcus faecalis  *  Critical Value/Significant Value, preliminary result only, called to and read back by  ANTHONY BERRY RN @6204 6.29.21 LM    (Note)  POSITIVE for ENTEROCOCCUS FAECALIS and NEGATIVE for Star/vanB genes  by "[employer] multiplex nucleic acid test. Final identification and  antimicrobial susceptibility testing will be verified by standard  methods.    Specimen tested with Verigene multiplex, gram-positive blood culture  nucleic acid test for the following targets: Staph aureus,  Staph  epidermidis, Staph lugdunensis, other Staph species, Enterococcus  faecalis, Enterococcus faecium, Streptococcus species, S. agalactiae,  S. anginosus grp., S. pneumoniae, S. pyogenes, Listeria sp., mecA  (methicillin resistance) and Star/B (vancomycin resistance).    Critical Value/Significant Value called to and read back by  Veena Vogel RN 6/30/21 @ 0045 TF     Susceptibility              Enterococcus faecalis       REUBEN       AMPICILLIN <=2 ug/mL Sensitive       Gentamicin Screen   Sensitive1       PENICILLIN 2 ug/mL Sensitive       VANCOMYCIN 1 ug/mL Sensitive          7/5 MARK ANTHONY  Interpretation Summary     1. The left ventricle is normal in structure, function and size. The visual  ejection fraction is estimated at 60%.  2. The right ventricle is mild to moderately dilated. The right ventricular  systolic function is normal.  3. There is mod-severe to severe (3-4+) tricuspid regurgitation. Small area of  failure to coapt of TV.  4. The right ventricular systolic pressure is approximated at 39mmHg plus the  right atrial pressure.  5. s/p tissue AVR, implanted 2016. Valve leaflets open well, no thrombus or  vegetation.     Echo from 6-28-21 showed EF 60%, mild RV dilation, 2+ TR.  Attestation:  Total time on the floor involved in the patient's care: 25 minutes. Total time spent in counseling/care coordination: >50%

## 2021-07-08 NOTE — PLAN OF CARE
Pt here with acute ischemic stroke. A&Ox4. VSS on RA. Tele: AFib CVR. Up with A2 Lift. Turn/Repo. Wheelchair at baseline. DD2 diet with thin liquids. Pills whole with water. Denies pain.  Neuros intact ex for slight weakness in RUE. LS clear. Incontinent B/B. Periarea/groin reddened. Smear BMx3.  PICC RUE, dressing saturated, per IV team will reassess today. Pt scoring green on the Aggression Stop Light Tool. Discharge pending, plan today to TCU. Continue to monitor.

## 2021-07-08 NOTE — PLAN OF CARE
Occupational Therapy Discharge Summary    Reason for therapy discharge:    Discharged to transitional care facility.    Progress towards therapy goal(s). See goals on Care Plan in [facility] electronic health record for goal details.  Goals partially met.  Barriers to achieving goals:   discharge from facility.    Therapy recommendation(s):    Continued therapy is recommended.  Rationale/Recommendations:  Recommend continued OT at TCU to maximize independence and safety with I/ADLs.

## 2021-07-08 NOTE — DISCHARGE INSTRUCTIONS
You are discharging to:    [facility]  [address] [place]  [address]  [phone]  Your risk factors for stroke or TIA (transient ischemic attack):    Your Risk Factors Your Results Normal Ranges   High blood pressure BP Readings from Last 1 Encounters:   07/08/21 (!) 149/75    Less than 120/80   Cholesterol              Total Lab Results   Component Value Date    CHOL 65 06/27/2021      Less than 150    Triglycerides   Lab Results   Component Value Date    TRIG 80 06/27/2021    Less than 150   LDL Lab Results   Component Value Date    LDL 31 06/27/2021       Less than 70   HDL Lab Results   Component Value Date    HDL 18 06/27/2021            Greater than 40 (men)  Greater than 50 (women)   Diabetes Recent Labs   Lab 07/08/21  0625   *    Fasting blood glucose    Smoking/tobacco use  Quit smoking and tobacco   Overweight  Lose 1-2 pounds a week   Lack of exercise  30 minutes moderate activity each day   Other risk factors include carotid (neck) artery disease, atrial fibrillation and stress. You may be on new medicine to treat high blood pressure, cholesterol, diabetes or atrial fibrillation.    Understanding Stroke Booklet given to patient. Please refer to booklet for further information.    Stroke warning signs and symptoms - CALL 911 right away for:  - Sudden numbness or weakness in the face, arm or leg (often on one side of the body).  - Sudden confusion or trouble understanding what is going on.  - Sudden blurred or decreased vision in one or both eyes.  - Sudden trouble speaking, loss of balance, dizziness or problems with coordination.  - Sudden, severe headache for no reason.  - Fainting or seizures.  - Symptoms may go away then come back suddenly.    Infectious Disease--  Recommendations :     1.  IV ampicillin + ceftriaxone   2. PICC in anticipation of long term IV antibiotics for 6 weeks until 8/12/2021 orders all in  3. Will need weekly labs and ID  FUP with  Dr. Patel 2-3 weeks after discharge

## 2021-07-08 NOTE — PROVIDER NOTIFICATION
Paged Hospitalist re: electrolyte replacements. k, phop, mg protocol ordered & activated this am. already has k scheduled, can you please review MAR with replacement protocol & advise, lots of additions.Will await advisement.  Patient may discharge this afternoon to TCU.

## 2021-07-08 NOTE — PLAN OF CARE
Speech Language Therapy Discharge Summary    Reason for therapy discharge:    Discharged to transitional care facility.    Progress towards therapy goal(s). See goals on Care Plan in [facility] electronic health record for goal details.  Goals not met.  Barriers to achieving goals:   discharge from facility.    Therapy recommendation(s):    Continue SLP for high level speech/language/cognition. Pt likely near baseline with DD2 diet.

## 2021-07-08 NOTE — DISCHARGE SUMMARY
[facility]    Discharge Summary  Hospitalist    Date of Admission:  6/26/2021  Date of Discharge:  7/8/2021  1:48 PM  Discharging Provider: Rebekah Patel DO  Date of Service (when I saw the patient): 07/08/21    Discharge Diagnoses   Acute Ischemic Stroke likely cardioembolic due to Atrial fibrillation   Permanent atrial fib   Moderate to severe TR (3-4+)  Possible Community acquired Pneumonia vs Aspiration Pneumonia.   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  Acute Renal Failure  Chronic Anemia, H/O Recent Upper GI bleeding secondary to Angio ectasia   History of stroke  Benign essential hypertension  S/P AVR & Ascending Aortic Aneurysm repair  Bilateral Lower Extremity edema and Weakness  Hypokalemia  Hypernatremia  Bilateral foot drop    History of Present Illness   Ekaterina Thayer is an 89 year old female with past medical hx of Hypertension, chronic afib on eliquis but not taking it recently because of the recent GI bleeding secondary to bleeding angioectasia in duodenum , S/P bioprosthetic AVR and Ascending Aorta tube graft for aneurysm, recurrent UTI's -- wheelchair bound, presented to [facility] ER after waking up with new right face droop, and transferred to [facility].     Hospital Course   Ekaterina Thayer was admitted on 6/26/2021.  The following problems were addressed during her hospitalization:    Acute Ischemic Stroke likely cardioembolic due to Atrial fibrillation   Presented with R facial droop. Recently taken off her eliquis due to GI bleed.   - Initial CT head negative for acute stroke, CTA head and neck done shows new filling defect within the basilar artery, concerning for partially occlusive thrombus.No evidence of significant stenosis in either internal carotid arteries.  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  - MRI of the brain shows acute/early subacute infarct at the right joey/medullary junction No other significant past medical history or family history. No hemorrhage.   -Neurology recommending risk for intraluminal thrombus in basilar artery and hx afib and potential bleeding risk with possible endocarditis, risk benefit ratio of anticoagulation considered by team. Given known thrombus neurology recommending continued anticoagulation  -7/3: routine repeat CTA showed resolution of thrombus  Repeat MRI Brain showed expected evolution of prior stroke, hold left occipital, right frontal and L pontine infarcts  -MARK ANTHONY 7/5 negative for thrombus or vegitation  1. The left ventricle is normal in structure, function and size. The visual  ejection fraction is estimated at 60%.  2. The right ventricle is mild to moderately dilated. The right ventricular  systolic function is normal.  3. There is mod-severe to severe (3-4+) tricuspid regurgitation. Small area of  failure to coapt of TV.  4. The right ventricular systolic pressure is approximated at 39mmHg plus the  right atrial pressure.  5. s/p tissue AVR, implanted 2016. Valve leaflets open well, no thrombus or  vegetation.     - per stroke neurology, stroke etiology likely cardioembolic 2/2 to atrial fibrillation.   - neurologically stable. Very subtle R facial droop persists.  - goal sbp <130/80  - Continue decreased dose atorvastatin 20 mg daily in light of LDL 31 and increased risk of ICH in LDL<40  - Follow up with Stroke Neurology with repeat CTA in 1-2 months  - follow up with PCP after discharge from TCU      Permanent atrial fib  PTA metoprolol increased to 25mg BID with improvement  -follow up primary cardiologist     Moderate to severe TR (3-4+)  -no signs RV failure  -RV mild to moderate dilation  -has ~1+ bLE edema to thigh  -  follow up with cardiology  - lasix 20mg daily with 40meq KCl daily, prescribed on discharge  - BMP check tomorrow and in one week     Possible Community acquired Pneumonia vs Aspiration Pneumonia.   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   -TTE - no evidence of endocarditis.   - blood cx: 6/28 +enterooccus 2/2, 6/30 +1/2, 7/1 ngtd, 7/3 ngtd  - IV ampicillin and ceftriaxone on discharge  - PICC placed 7/5 in anticipation of long term IV antibiotics for 6 weeks until 8/12/2021  -Will need weekly labs and ID FUP with  Dr. Patel 2-3 weeks after discharge     Acute Renal Failure-resolved   -Cr 0.65 3/9/21.   -Patient has recently elevated creatinine in the range of 1.2-1.4, but in may of 2021 and march of 2021 she had normal creatinine.   -Most likely develop ARF during her admission with GI bleeding and likely in ATN at this time. She received IV contrast twice for CTA head and neck and perfusion,.   -CR back to baseline at time of discharge, 0.76  - follow bmp at tcu  - start lasix (with Kdur) for TR.     Chronic Anemia, H/O Recent Upper GI bleeding secondary to Angio ectasia   -Patient appears to have chronic anemia with recent acute on chronic anemia secondary to GI bleeding because of  duodenal angioectasia s/p clip placement.   -Hb stable 8-9 range.   - stop PTA omeprazole, continue pantoprazole 40mg BID  -follow Hb, recheck at tcu     History of stroke prior old Right frontal, and recently left Cerebellar on 4/8/21, and unable to walk related to left leg weakness Wheel chair bound.   - Monitor.      Benign essential hypertension  -goal <130/80.   -On Metoprolol 12.5 mg bid at home, increased to 25mg BID with improved BPs.  -starting lasix for severe TR to control edema. Follow  sbp on this med  -follow up pcp after tcu discharge.   -see how she does on addition of lasix for LE edema and TR.      S/P AVR & Ascending Aortic Aneurysm repair Stable.  - Monitor.   -follow up with [facility] cardiology in several weeks.      Bilateral Lower Extremity edema and Weakness. She has 1+ LE edema, most likely chronic, per report. US Venous Bilateral LE negative for DVT  - Monitor.   -MARK ANTHONY showed mod-severe TR.   - will start lasix 20mg every day and Kdur.   - bmp tomorrow am and in one week at tcu     Hypokalemia, Hypernatremia-resolved.    Hypernatremia likely because of NS, per report.   - 40meq KCl daily on discharge, BMP in one week and in one day     Bilateral foot drop. Uses AFOs, chronic.   PT involved.     Rebekah Patel, DO    Significant Results and Procedures   See above    Pending Results   These results will be followed up by Dr Stephens  Unresulted Labs Ordered in the Past 30 Days of this Admission     Date and Time Order Name Status Description    7/3/2021 1243 Blood culture Preliminary     7/3/2021 1243 Blood culture Preliminary           Code Status   DNR, pre-arrest intubation [place]       Primary Care Physician   Marian Heller    Physical Exam   Temp: 97.8  F (36.6  C) Temp src: Oral BP: (!) 149/75 Pulse: 80   Resp: 16 SpO2: 95 % O2 Device: None (Room air)    Vitals:    06/28/21 0400 06/28/21 1704 07/06/21 0737   Weight: 83.6 kg (184 lb 4.9 oz) 80.1 kg (176 lb 9.4 oz) 72 kg (158 lb 11.7 oz)     Vital Signs with Ranges  Temp:  [97.8  F (36.6  C)-98.4  F (36.9  C)] 97.8  F (36.6  C)  Pulse:  [60-80] 80  Resp:  [15-18] 16  BP: (122-149)/(60-77) 149/75  SpO2:  [94 %-96 %] 95 %  I/O last 3 completed shifts:  In: 565 [P.O.:565]  Out: -     Patient seen and examined. She is doing well. No new weakness. Feels ready to go to TCU. No chest pain, shortness of breath, nausea, vomiting. Tolerated diet. Stable for discharge to TCU    Constitutional: Awake, alert, cooperative, no apparent  distress.  Eyes: Conjunctiva and pupils examined and normal.  HEENT: Moist mucous membranes, normal dentition. Subtle right facial droop (minimal)  Respiratory: Clear to auscultation bilaterally, no crackles or wheezing.  Cardiovascular: irregularly irregular, normal S1 and S2, and no murmur noted.  GI: Soft, non-distended, non-tender, normal bowel sounds.  Lymph/Hematologic: No anterior cervical or supraclavicular adenopathy.  Skin: No rashes, no cyanosis, +1 edema.  Musculoskeletal: No joint swelling, erythema or tenderness.  Neurologic: Cranial nerves 2-12 intact, normal strength and sensation. Chronic bilateral foot drop  Psychiatric: Alert, oriented to person, place and time, no obvious anxiety or depression.    Discharge Disposition   Discharged to TCU  Condition at discharge: Stable    Consultations This Hospital Stay   NEUROLOGY IP CONSULT  PHARMACY IP CONSULT  PHARMACY IP CONSULT  PHARMACY IP CONSULT  PHARMACY IP CONSULT  PHYSICAL THERAPY ADULT IP CONSULT  SWALLOW EVAL SPEECH PATH AT BEDSIDE IP CONSULT  OCCUPATIONAL THERAPY ADULT IP CONSULT  CARE MANAGEMENT / SOCIAL WORK IP CONSULT  PHARMACY TO DOSE WARFARIN  INFECTIOUS DISEASES IP CONSULT  GASTROENTEROLOGY IP CONSULT  PHARMACY TO DOSE WARFARIN  NEUROLOGY IP CONSULT  PHARMACY IP CONSULT  PHARMACY IP CONSULT  PHARMACY IP CONSULT  ADVANCE DIRECTIVE IP CONSULT  VASCULAR ACCESS ADULT IP CONSULT  PHARMACY LIAISON FOR MEDICATION COVERAGE CONSULT  PHYSICAL THERAPY ADULT IP CONSULT  OCCUPATIONAL THERAPY ADULT IP CONSULT    Time Spent on this Encounter   IRebekah DO, personally saw the patient today and spent greater than 30 minutes discharging this patient.    Discharge Orders      CTA Head Neck with Contrast     NEUROLOGY ADULT REFERRAL      NEUROLOGY ADULT REFERRAL      General info for SNF    Length of Stay Estimate: Short Term Care: Estimated # of Days <30  Condition at Discharge: Improving  Level of care:skilled   Rehabilitation Potential:  Good  Admission H&P remains valid and up-to-date: Yes  Recent Chemotherapy: N/A  Use Nursing Home Standing Orders: Yes     Mantoux instructions    Give two-step Mantoux (PPD) Per Facility Policy Yes     Reason for your hospital stay    -Cardioembolic CVA  -Possible Community acquired Pneumonia vs Aspiration Pneumonia.   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 MARK ANTHONY negative     Intake and output    Every shift     Daily weights    Call Provider for weight gain of more than 2 pounds per day or 5 pounds per week.     Activity - Up with nursing assistance     Brief Discharge Instructions    1. She will be on 40meq KCl repletion daily due to lower levels with start of lasix.    2. She had lower phosphorus level on day of discharge and was started on phosphorus repletion. She is due for 2 more packets on discharge.     Follow Up and recommended labs and tests    1. Follow up Stroke Neurology Clinic. [facility] Dr. Fernandes. 2 months. Referral placed They make their own appointments    2. Follow up with Dr. Stephens Infectious disease of [facility] in 2-3 weeks follow up infection, abx, picc line  You have a Follow up Appointment with Dr. Stephens at [facility] on Tuesday August 10th  at 11:00 AM.  Address :  [address] [address]  If you have any questions about this appointment please call the clinic at Phone Number [phone]      3. Follow up with primary cardiologist at [facility] 2-3 weeks follow up aortic valve replacement and severe tricuspid regurgitation and lasix therapy  You have an appointment with your Cardiologist at the [facility] already:  You have a Follow up Appointment with Isa RONDON Cardiology at [facility] on Friday July 30th  at 2:55PM.  Address :  [facility] [place]  [address]  If you have any questions about this appointment or need to reschedule it please call the clinic at Phone Number [phone]    4. Follow up primary care doctor 1 week after discharge from TCU with BMP   Your Primary Doctor is Dr. Heller at [facility] [phone]    5. BMP and phos level in 1 day and at 1 week following admission to TCU    6. Follow up with primary cardiologist [facility] regarding severe tricuspid regurgitation, bioprosthetic aortic valve 1-3 weeks following discharge from TCU.They can make any further follow appointments at  you July 30th appointment.     No CPR- Pre-arrest intubation [place]     Physical Therapy Adult Consult    Evaluate and treat as clinically indicated.    Reason:  Acute cva, deconditioned, chronic bilateral foot drop and uses bilateral AFO braces     Occupational Therapy Adult Consult    Evaluate and treat as clinically indicated.    Reason:  Acute cVA, R facial droop. Deconditioned, safety eval     Fall precautions     Advance Diet as Tolerated    Follow this diet upon discharge: Orders Placed This Encounter      Snacks/Supplements Pediatric: Ensure Enlive; With Meals      Dysphagia Diet Level 2 [facility] Altered Thin Liquids (water, ice chips, juice, milk gelatin, ice cream, etc)     Discharge Medications   Discharge Medication List as of 7/8/2021  1:45 PM      START taking these medications    Details   ampicillin (OMNIPEN) 2 g vial Inject 2 g into the vein every 6 hours Weekly ESR,CRP, CBC/diff, creatinine and ALT while on this medication, Disp-1120 mL, R-0, Infusion Therapy      cefTRIAXone (ROCEPHIN) 1 GM vial Inject 2 g (2,000 mg) into the vein daily CBC with differential, creatinine, SGOT weekly while on this medication to be faxed to Dr. Stephens office., Disp-600 mL, R-0, Infusion Therapy      furosemide (LASIX) 20 MG tablet Take 1 tablet (20 mg) by mouth daily, Transitional      pantoprazole (PROTONIX) 40 MG EC tablet Take 1 tablet  (40 mg) by mouth 2 times daily (before meals) for 14 days, Transitional      potassium & sodium phosphates (NEUTRA-PHOS) 280-160-250 MG Packet Take 1 packet by mouth 3 times daily for 2 doses, Transitional         CONTINUE these medications which have CHANGED    Details   atorvastatin (LIPITOR) 20 MG tablet Take 1 tablet (20 mg) by mouth every evening, Transitional      metoprolol tartrate (LOPRESSOR) 25 MG tablet Take 1 tablet (25 mg) by mouth 2 times daily, Transitional      potassium chloride ER (KLOR-CON M) 20 MEQ CR tablet Take 2 tablets (40 mEq) by mouth daily, Transitional         CONTINUE these medications which have NOT CHANGED    Details   acetaminophen (TYLENOL) 325 MG tablet Take 650 mg by mouth every 8 hours as needed for mild pain, Historical      apixaban ANTICOAGULANT (ELIQUIS) 5 MG tablet Take 5 mg by mouth 2 times daily, Historical      cyanocobalamin (VITAMIN B-12) 1000 MCG tablet Take 1,000 mcg by mouth daily, Historical      ferrous sulfate (FEROSUL) 325 (65 Fe) MG tablet Take 325 mg by mouth three times a week Mon - Wed - Fri. Takes with orange juice., Historical      folic acid (FOLVITE) 400 MCG tablet Take 2,000 mcg by mouth daily 400 mcg x 5 tablets for 1 month then on 7/18/21 decrease to 400 mcg daily, Historical      latanoprost (XALATAN) 0.005 % ophthalmic solution Place 1 drop into both eyes At Bedtime, Historical      mirtazapine (REMERON SOL-TAB) 15 MG ODT 15 mg by Orally disintegrating tablet route At Bedtime, Historical      ondansetron (ZOFRAN-ODT) 4 MG ODT tab Take 4 mg by mouth 2 times daily as needed for nausea, Historical      omeprazole (PRILOSEC) 20 MG DR capsule Take 20 mg by mouth daily, Historical         STOP taking these medications       gabapentin (NEURONTIN) 100 MG capsule Comments:   Reason for Stopping:         gabapentin (NEURONTIN) 300 MG capsule Comments:   Reason for Stopping:             Allergies   No Known Allergies  Data   Most Recent 3 CBC's:  Recent Labs    Lab Test 07/04/21  0859 07/03/21  0805 07/02/21  0906   WBC 7.4 8.8 9.0   HGB 9.2* 9.2* 9.3*   MCV 85 84 84    322 296      Most Recent 3 BMP's:  Recent Labs   Lab Test 07/08/21  0625 07/07/21  0630 07/06/21  1855 07/06/21  0513    142  --  142   POTASSIUM 3.4 3.8 3.8 3.3*   CHLORIDE 108 110*  --  109   CO2 27 29  --  28   BUN 10 9  --  10   CR 0.76 0.82  --  0.88   ANIONGAP 5 3  --  5   MANDI 8.5 8.1*  --  8.1*   * 94  --  111*     Most Recent 2 LFT's:  Recent Labs   Lab Test 07/02/21  0906 06/28/21  0855   AST 13 16   ALT 10 13   ALKPHOS 79 77   BILITOTAL 0.3 0.3     Most Recent INR's and Anticoagulation Dosing History:  Anticoagulation Dose History     Recent Dosing and Labs Latest Ref Rng & Units 6/30/2021 7/1/2021 7/2/2021 7/3/2021 7/4/2021 7/5/2021 7/6/2021    Warfarin 3 mg - 3 mg - - - - - -    Warfarin 5 mg - - 5 mg - - - - -    INR 0.86 - 1.14 1.50(H) 1.53(H) 2.04(H) 2.64(H) 2.81(H) 2.18(H) 1.70(H)        Most Recent 3 Troponin's:No lab results found.  Most Recent Cholesterol Panel:  Recent Labs   Lab Test 06/27/21  0725   CHOL 65   LDL 31   HDL 18*   TRIG 80     Most Recent 6 Bacteria Isolates From Any Culture (See EPIC Reports for Culture Details):  Recent Labs   Lab Test 07/03/21  1405 07/03/21  1359 07/01/21  0852 06/30/21  1316 06/28/21  1220   CULT No growth after 5 days No growth after 5 days No growth No growth  Cultured on the 3rd day of incubation:  Enterococcus faecalis  Susceptibility testing done on previous specimen  *  Critical Value/Significant Value called to and read back by  Gill Bhakta RN @0842 7/03/2021 gd   Cultured on the 2nd day of incubation:  Enterococcus faecalis  Susceptibility testing done on previous specimen  *  Critical Value/Significant Value, preliminary result only, called to and read back by  LUIS M MENJIVAR, RN 1350 6.30.21 NDP    Cultured on the 1st day of incubation:  Enterococcus faecalis  *  Critical Value/Significant Value, preliminary  result only, called to and read back by  ANTHONY VOGEL RN @2136 6.29.21 LM    (Note)  POSITIVE for ENTEROCOCCUS FAECALIS and NEGATIVE for Star/vanB genes  by Verigene multiplex nucleic acid test. Final identification and  antimicrobial susceptibility testing will be verified by standard  methods.    Specimen tested with Verigene multiplex, gram-positive blood culture  nucleic acid test for the following targets: Staph aureus, Staph  epidermidis, Staph lugdunensis, other Staph species, Enterococcus  faecalis, Enterococcus faecium, Streptococcus species, S. agalactiae,  S. anginosus grp., S. pneumoniae, S. pyogenes, Listeria sp., mecA  (methicillin resistance) and Star/B (vancomycin resistance).    Critical Value/Significant Value called to and read back by  Anthony Vogel RN 6/30/21 @ 0045 TF       Most Recent TSH, T4 and A1c Labs:  Recent Labs   Lab Test 07/02/21  0906   A1C 5.9*     Results for orders placed or performed during the hospital encounter of 06/26/21   CT Head w/o Contrast    Narrative    CT OF THE HEAD WITHOUT CONTRAST 6/26/2021 3:10 PM     COMPARISON: None available    HISTORY: Neurologic deficit, acute, stroke suspected. Known partially  occlusive basilar thrombus.    TECHNIQUE: 5 mm thick axial CT images of the head were acquired  without IV contrast material.    FINDINGS: There is mild diffuse cerebral volume loss. There are subtle  patchy areas of decreased density in the cerebral white matter  bilaterally that are consistent with sequela of chronic small vessel  ischemic disease. There are small areas of hypodensity in the high  posterolateral right frontal lobe (series 3, image 22) and in the  posteromedial left occipital lobe that could represent subacute  ischemic infarct.    The ventricles and basal cisterns are within normal limits in  configuration given the degree of cerebral volume loss.  There is no  midline shift. There are no extra-axial fluid collections.    No intracranial  hemorrhage, mass or recent infarct.    The visualized paranasal sinuses are well-aerated. There is no  mastoiditis. There are no fractures of the visualized bones.      Impression    IMPRESSION:   1. Possible subacute ischemic infarcts in the high right frontal and  left occipital lobes. Brain MRI may be helpful for further evaluation.    2. Diffuse cerebral volume loss and cerebral white matter changes  consistent with chronic small vessel ischemic disease.        Radiation dose for this scan was reduced using automated exposure  control, adjustment of the mA and/or kV according to patient size, or  iterative reconstruction technique    SMITHA WEST MD   CTA Head Neck with Contrast    Narrative    CT ANGIOGRAM OF THE HEAD AND NECK WITHOUT AND WITH CONTRAST  6/26/2021  3:12 PM     COMPARISON: None available    HISTORY: Stroke/transient ischemic attack, assess intracranial  arteries. Known partially occlusive basilar thrombus, more somnolent.    TECHNIQUE:  Precontrast localizing scans were followed by CT  angiography with an injection of 70mL Isovue-370 nonionic intravenous  contrast material with scans through the head and neck.  Images were  transferred to a separate 3-D workstation where multiplanar  reformations and 3-D images were created.  Estimates of carotid  stenoses are made relative to the distal internal carotid artery  diameters except as noted.      FINDINGS:   Neck CTA: The common carotid arteries bilaterally are patent without  vascular narrowing. The cervical internal carotid arteries bilaterally  are tortuous but are patent without vascular narrowing. The vertebral  arteries bilaterally are tortuous but are patent without vascular  narrowing.    Head CTA: There is a filling defect at the confluence between the  right vertebral artery and basilar artery consistent with the history  of nonocclusive basilar artery thrombus. The basilar artery is  otherwise patent and unremarkable. The bilateral  intracranial distal  internal carotid, bilateral anterior cerebral, bilateral middle  cerebral and bilateral posterior cerebral arteries are patent and  unremarkable. The anterior communicating artery is patent.      Impression    IMPRESSION:  1. Presumed nonocclusive thrombus at the proximal basilar artery is  noted consistent with patient's history of partially occlusive basilar  thrombus.  2. Otherwise, normal neck and head CTA.      Radiation dose for this scan was reduced using automated exposure  control, adjustment of the mA and/or kV according to patient size, or  iterative reconstruction technique    SMITHA WEST MD   CT Head Perfusion w Contrast    Narrative    CT BRAIN PERFUSION 6/26/2021 3:14 PM    COMPARISON: None    HISTORY: Neurologic deficit, acute, stroke suspected.    TECHNIQUE: Time sequential axial CT images of the head were acquired  during the administration of intravenous contrast (50mL Isovue-370).  CTA images of the [place] as well as color perfusion maps of  the brain were created from this time sequential axial source data.      Impression    IMPRESSION: There is delayed arrival of the contrast bolus to the left  PICA distribution along the undersurface of the left cerebellar  hemisphere that results in decreased cerebral blood flow but no  significant alteration of cerebral blood volume. No other perfusion  defects.    Radiation dose for this scan was reduced using automated exposure  control, adjustment of the mA and/or kV according to patient size, or  iterative reconstruction technique    SMITHA WEST MD   US Upper Extremity Venous Duplex Left    Narrative    ULTRASOUND UPPER EXTREMITY VENOUS DUPLEX LEFT   6/27/2021 9:06 AM     HISTORY: Rule out deep vein thrombosis, swollen arm.    COMPARISON: None available    FINDINGS: Gray-scale, color and Doppler spectral analysis ultrasound  was performed of the left upper extremity. Compression and  augmentation imaging was  performed.    No evidence of deep venous thrombosis in the left upper extremity.       Impression    IMPRESSION: No evidence of deep venous thrombosis in the left upper  extremity.     MARIKA SUMMERS MD   XR Chest Port 1 View    Narrative    CHEST PORTABLE ONE VIEW   6/27/2021 9:36 AM     HISTORY: Stroke, history of fever, rule out pneumonia.    COMPARISON: None available      Impression    IMPRESSION: Single portable AP view of the chest was obtained.  Postsurgical changes of cardiac surgery with median sternotomy wires  and surgical clips. Mild enlargement of the cardiac silhouette. Small  left pleural effusion and associated basilar  atelectasis/consolidation. No significant right pleural effusion. No  significant pneumothorax.    MARIKA SUMMERS MD   US Lower Extremity Venous Duplex Bilateral    Narrative    EXAM: US LOWER EXTREMITY VENOUS DUPLEX BILATERAL  LOCATION: [facility]  DATE/TIME: 6/27/2021 7:46 PM    INDICATION: Bilateral leg swelling.  COMPARISON: None.  TECHNIQUE: Venous Duplex ultrasound of bilateral lower extremities with and without compression, augmentation and duplex. Color flow and spectral Doppler with waveform analysis performed.    FINDINGS: Exam includes the common femoral, femoral, popliteal veins as well as segmentally visualized deep calf veins and greater saphenous vein.     RIGHT: No deep vein thrombosis. No superficial thrombophlebitis. No popliteal cyst.    LEFT: No deep vein thrombosis. No superficial thrombophlebitis. No popliteal cyst.      Impression    IMPRESSION:  1.  No deep venous thrombosis in the bilateral lower extremities.   XR Abdomen Port 1 View    Narrative    EXAM: XR ABDOMEN PORT 1 VIEWS  LOCATION: [facility]  DATE/TIME: 6/28/2021 3:15 AM    INDICATION: Assess for retained endoscopic capsule prior to MRI.  COMPARISON: None.      Impression    IMPRESSION: There is a 2 cm metallic foreign body in the central abdomen. Bowel gas pattern  is unremarkable.    MR Brain w/o Contrast    Narrative    EXAM: MR BRAIN W/O CONTRAST  LOCATION: [facility]  DATE/TIME: 6/28/2021 2:35 AM    INDICATION: Stroke, follow up  COMPARISON: CTA head and neck 6/26/2021  TECHNIQUE: Routine multiplanar multisequence head MRI without intravenous contrast.    FINDINGS:  Motion degraded exam.    INTRACRANIAL CONTENTS: There is a small subcentimeter zone of restricted diffusion at the right pontine/medullary junction. No associated hemorrhage. There is associated T2/flair hyperintensity. No associated hemorrhage. Patchy and confluent nonspecific   T2/FLAIR hyperintensities within the cerebral white matter most consistent with moderate chronic microvascular ischemic change. Chronic infarct right frontal lobe. Chronic infarct left occipital lobe. Chronic lacunar infarcts in the bilateral cerebellar   hemispheres. Chronic lacunar infarct in the left joey. Mild to moderate generalized cerebral atrophy. No hydrocephalus. Normal position of the cerebellar tonsils.     SELLA: No abnormality accounting for technique.    OSSEOUS STRUCTURES/SOFT TISSUES: Normal marrow signal. The major intracranial vascular flow voids are maintained.     ORBITS: No abnormality accounting for technique.     SINUSES/MASTOIDS: Mild mucosal thickening scattered about the paranasal sinuses. Scattered fluid/membrane thickening in the mastoid air cells bilaterally.       Impression    IMPRESSION:  1.  Motion degraded exam.  2.  Subcentimeter acute/early subacute infarct at the right joey/medullary junction. No associated hemorrhage.  3.  Age-related changes with chronic infarcts in the left joey, left occipital lobe, bilateral cerebellar hemispheres, and right frontal lobe.   MR Brain w/o & w Contrast    Narrative    MRI BRAIN WITHOUT AND WITH CONTRAST  7/2/2021 7:00 PM    HISTORY:  Stroke, follow up.     TECHNIQUE:  Multiplanar, multisequence MRI of the brain without and  with 8 mL  Gadavist.    COMPARISON: 6/28/2021.    FINDINGS: Diffusion-weighted images show a few tiny areas of  restricted diffusion in the inferior right and left cerebellum, the  right posterolateral medulla, and the right occipital lobe consistent  with small acute ischemic infarcts. These are not associated with any  mass effect. There is no evidence for any intracranial hemorrhage.  There is old right frontal cortical infarct. There is also an old left  occipital infarct. Cerebral atrophy and patchy white matter changes  are also present. Vascular structures are patent at the skull base.  There is also an old left pontine infarct.      Impression    IMPRESSION:  1. Several small tiny areas of acute ischemic infarction involving the  inferior cerebellum, the right posterolateral medulla, and the right  occipital lobe. These are slightly more conspicuous than that seen on  the recent study.  2. 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  3. Cerebral atrophy with some scattered white matter changes  consistent with chronic small vessel ischemic disease.     YONY CRENSHAW MD          SYSTEM ID:  [id number]   CTA Head Neck with Contrast    Narrative    CTA HEAD AND NECK WITH CONTRAST 7/2/2021 6:14 PM     HISTORY: Stroke/TIA, assess intracranial arteries. Stroke/TIA, assess  extracranial arteries. Brainstem infarct.    TECHNIQUE: Axial images were obtained through the head and neck with  intravenous contrast with 70 mL of Isovue-370 was given. Multiplanar  reconstructions were performed. 3-D reconstructions of remote  workstation for CT angiography were also acquired. Carotid stenoses  were provided by comparing the caliber of the proximal internal  carotid artery to the caliber of the distal internal carotid arteries.  Radiation dose for this scan was reduced using automated exposure  control, adjustment of the mA and/or kV according to patient size, or  iterative reconstruction technique.    COMPARISON:  2021    FINDINGS:    Brachiocephalic vessels: There is some calcific plaque in the thoracic  arch. Proximal brachiocephalic vessels are widely patent.    Right carotid system: Normal.    Left carotid system: Normal.    Right vertebral artery: Normal.    Left vertebral artery The distal left vertebral artery is slightly  smaller than the right vertebral artery and does show some mild distal  caliber change which is probably just within normal limits although is  difficult to exclude a subtle recent dissection in this area.    [place]: Small nonocclusive thrombus seen in the proximal  basilar artery on prior exam is no longer evident. The basilar artery  appears to be widely patent. The distal internal carotid arteries are  also patent. The proximal anterior, middle, and posterior cerebral  arteries are patent.    Other findings: Degenerative changes are seen in the spine.  Moderate-sized pleural effusions are seen in the lungs.      Impression    IMPRESSION:  1. Interval resolution of previously seen nonocclusive proximal  basilar artery thrombus.  2. Mild caliber change in distal nondominant left vertebral artery  which is probably within normal limits although is difficult to  exclude a subtle recent dissection this area.  3. Moderate-sized bilateral pleural effusions.    YONY CRENSHAW MD          SYSTEM ID:  [id number]   XR Chest Port 1 View    Narrative    CHEST ONE VIEW  2021 1:09 PM     HISTORY: Atrial fibrillation.    COMPARISON: 2021      Impression    IMPRESSION: Right PICC tip in the low SVC. Mild left lower lobe  atelectasis and/or infiltrate similar to previous.    PAVEL SEYMOUR MD          SYSTEM ID:  [id number]   Echocardiogram Complete    Narrative    [id number]  [id number]  [id number]  [id number]^BRENNA^SYLVIA^JOLLY     [facility]  Echocardiography Laboratory  [address] [place] [address]     Name: GENEVIEVE MERCADO  MRN: [id number]  : 1932  Study Date:  06/28/2021 08:25 AM  Age: 89 yrs  Gender: Female  Patient Location: [facility]  Reason For Study: TIA  Ordering Physician: SYLVIA CEJA  Referring Physician: TIERA RAO  Performed By: PEREZ Salazar     BSA: 1.8 m2  Height: 61 in  Weight: 184 lb  HR: 92  BP: 121/79 mmHg  ______________________________________________________________________________  Procedure  Complete Portable Bubble Echo Adult. Optison (NDC #1862-0240) given  intravenously.  ______________________________________________________________________________  Interpretation Summary     1. Left ventricular systolic function is normal. The visual ejection fraction  is estimated at 55-60%.  2. No regional wall motion abnormalities noted.  3. The right ventricle is mildly dilated. The right ventricular systolic  function is normal.  4. The left atrium is severely dilated.  5. There is moderate (2+) tricuspid regurgitation.  6. S/P bioprosthetic AVR. Appears well seated with grossly normal function;  mean gradient 15 mmHg, peak velocity 2.5 m/sec.  7. There is no color Doppler evidence of an atrial shunt. A contrast injection  (Bubble Study) was performed that was negative for flow across the interatrial  septum.  ______________________________________________________________________________  Left Ventricle  The left ventricle is normal in size. There is normal left ventricular wall  thickness. The visual ejection fraction is estimated at 55-60%. Left  ventricular systolic function is normal. Left ventricular diastolic function  is indeterminate. No regional wall motion abnormalities noted.     Right Ventricle  The right ventricle is mildly dilated. The right ventricular systolic function  is normal.     Atria  The left atrium is severely dilated. The right atrium is severely dilated.  There is no color Doppler evidence of an atrial shunt. A contrast injection  (Bubble Study) was performed that was negative for flow across the interatrial  septum.      Mitral Valve  There is mild mitral annular calcification.     Tricuspid Valve  The tricuspid valve is normal in structure and function. There is moderate  (2+) tricuspid regurgitation.     Aortic Valve  The aortic valve is trileaflet with aortic valve sclerosis. There is a  bioprosthetic aortic valve. S/P bioprosthetic AVR. Appears well seated with  grossly normal function; mean gradient 15 mmHg, peak velocity 2.5 m/sec.     Pulmonic Valve  The pulmonic valve is not well seen, but is grossly normal.     Vessels  Normal size aorta. IVC diameter >2.1 cm collapsing <50% with sniff suggests a  high RA pressure estimated at 15 mmHg or greater.     Pericardium  There is no pericardial effusion.     ______________________________________________________________________________  MMode/2D Measurements & Calculations  IVSd: 0.92 cm  LVIDd: 3.5 cm  LVIDs: 2.5 cm  LVPWd: 0.85 cm  FS: 29.1 %  LV mass(C)d: 88.8 grams  LV mass(C)dI: 48.7 grams/m2     Ao root diam: 3.0 cm  asc Aorta Diam: 3.5 cm  LVOT diam: 2.0 cm  LVOT area: 3.2 cm2  LA Volume (BP): 89.9 ml  LA Volume Index (BP): 49.4 ml/m2     LA Volume Indexed (AL/bp): 48.5 ml/m2  RWT: 0.48     Doppler Measurements & Calculations  MV E max arley: 139.0 cm/sec  MV max P.5 mmHg  MV mean P.9 mmHg  MV V2 VTI: 30.9 cm  MVA(VTI): 2.3 cm2  MV P1/2t max arley: 142.0 cm/sec  MV P1/2t: 268.3 msec  MVA(P1/2t): 0.82 cm2  MV dec slope: 155.0 cm/sec2  MV dec time: 0.20 sec  Ao V2 max: 246.8 cm/sec  Ao max P.0 mmHg  Ao V2 mean: 180.2 cm/sec  Ao mean P.7 mmHg  Ao V2 VTI: 47.1 cm  ANDRES(I,D): 1.5 cm2  ANDRES(V,D): 1.5 cm2  LV V1 max P.2 mmHg  LV V1 max: 114.1 cm/sec  LV V1 VTI: 21.7 cm  SV(LVOT): 70.6 ml  SI(LVOT): 38.7 ml/m2  PA acc time: 0.06 sec  TR max arley: 203.9 cm/sec  TR max P.7 mmHg  AV Arley Ratio (DI): 0.46  ANDRES Index (cm2/m2): 0.82     E/E' av.3  Lateral E/e': 19.8  Medial E/e': 16.8      ______________________________________________________________________________  Report approved by: Miky Sommer 2021 11:34 AM         Transesophageal Echocardiogram    Narrative    [id number]  [id number]  [id number]^TOMASA^NEL^ESTELA     [facility]  Echocardiography Laboratory  [address] [place] [address], [place] [address]     Name: GENEVIEVE MERCADO  MRN: [id number]  : 1932  Study Date: 2021 08:20 AM  Age: 89 yrs  Gender: Female  Patient Location: [facility]  Reason For Study: Endocarditis  Ordering Physician: NEL RANDOLPH  Referring Physician: TIERA RAO  Performed By: PEREZ Salazar     BSA: 1.8 m2  Height: 61 in  Weight: 176 lb  HR: 105  BP: 172/62 mmHg  ______________________________________________________________________________  Procedure  Complete MARK ANTHONY Adult.  ______________________________________________________________________________  Interpretation Summary     1. The left ventricle is normal in structure, function and size. The visual  ejection fraction is estimated at 60%.  2. The right ventricle is mild to moderately dilated. The right ventricular  systolic function is normal.  3. There is mod-severe to severe (3-4+) tricuspid regurgitation. Small area of  failure to coapt of TV.  4. The right ventricular systolic pressure is approximated at 39mmHg plus the  right atrial pressure.  5. s/p tissue AVR, implanted . Valve leaflets open well, no thrombus or  vegetation.     Echo from 21 showed EF 60%, mild RV dilation, 2+ TR.  ______________________________________________________________________________  MARK ANTHONY  Versed (1.5mg) was given intravenously. Fentanyl (25mcg) was given  intravenously. I determined this patient to be an appropriate candidate for  the planned sedation and procedure and have reassessed the patient immediately  prior to sedation and procedure. Total sedation time: 23 minutes of continuous  bedside 1:1 monitoring.  Consent to the procedure was obtained prior to  sedation. The transesophageal probe was passed without difficulty. There were  no complications associated with this procedure.     Left Ventricle  The left ventricle is normal in structure, function and size. The visual  ejection fraction is estimated at 60%. Normal left ventricular wall motion.     Right Ventricle  The right ventricle is mild to moderately dilated. The right ventricular  systolic function is normal.     Atria  No thrombus is detected in the left atrial appendage.     Mitral Valve  There is mild (1+) mitral regurgitation.     Tricuspid Valve  There is mod-severe to severe (3-4+) tricuspid regurgitation. Small area of  failure to coapt of TV. The right ventricular systolic pressure is  approximated at 39mmHg plus the right atrial pressure.     Aortic Valve  No aortic regurgitation is present. s/p tissue AVR, implanted . Valve  leaflets open well, no thrombus or vegetation.     Pulmonic Valve  The pulmonic valve is not well seen, but is grossly normal.     Vessels  s/p ascending aorta repair. Dilated pulmonary veins.     Pericardial/Pleural  There is no pericardial effusion.     Rhythm  The rhythm was atrial fibrillation.  ______________________________________________________________________________  MMode/2D Measurements & Calculations  asc Aorta Diam: 3.4 cm     Doppler Measurements & Calculations  TR max katie: 313.7 cm/sec  TR max P.4 mmHg     ______________________________________________________________________________  Report approved by: Adalid Patel 2021 09:29 AM         *Transesophageal Echocardiogram    Narrative    Abhishek Kerr MD     2021  9:02 AM  [facility]    Procedure: *Transesophageal Echocardiogram    Date/Time: 2021 9:00 AM  Performed by: Abhishek Kerr MD  Authorized by: Abhishek Kerr MD     [place] PROTOCOL   Site Marked:  NA  Prior Images Obtained and Reviewed:  Yes  Required items: Required blood products, implants, devices and special   equipment available    Patient identity confirmed:  Verbally with patient  Patient was reevaluated immediately before administering moderate or deep   sedation or anesthesia  Confirmation Checklist:  Patient's identity using two indicators  Time out: Immediately prior to the procedure a time out was called    Universal Protocol: the Joint Commission Universal Protocol was followed          SEDATION    Patient Sedated: Yes    Sedation:  Midazolam and fentanyl  Vital signs: Vital signs monitored during sedation    PROCEDURE   Patient Tolerance:  Patient tolerated the procedure well with no immediate   complications  Describe Procedure: MARK ANTHONY Note    Indication: CVA    Informed consent was signed by the patient.  A timeout was taken.    Sedation:  Versed 1.5mg  Fentanyl 25mcg    Findings:  LV: EF 60%  RV: normal  LA: no thrombus  Mitral valve: mild MR  Aortic valve: tissue AVR, normal function  Tricuspid valve: 3-4+ TR  Atrial septum: negative bubble study  Aorta: s/p repair, normal    No complications.    Abhishek Kerr MD  Cardiology - [facility]  Pager: [phone]  Text Page  July 5, 2021    Length of time physician/provider present for 1:1 monitoring during   sedation: 20

## 2021-07-08 NOTE — PROGRESS NOTES
Care Management Discharge Note    Discharge Date: 07/08/21       Discharge Disposition: Transitional Care    Discharge Services:      Discharge DME: Other (see comment)(Per PT rec)    Discharge Transportation: family or friend will provide    Private pay costs discussed: Not applicable    PAS Confirmation Code:    Patient/family educated on Medicare website which has current facility and service quality ratings: no    Education Provided on the Discharge Plan:    Persons Notified of Discharge Plans: Elvis Vanegas  Patient/Family in Agreement with the Plan: yes    Handoff Referral Completed: Yes  PT has multiple specialty appointments and is discharging to a TCU.  Additional Information:  I reviewed the BPCI letter with the pt's son Guilherme and left the letter in the pt's room.   I updated the pt's PCP in [facility] with the information Guilherme provided to me.  I made the f/u appointments that needed to be made at this time and placed them on the AVS:  1. Follow up Stroke Neurology Clinic. [facility] Dr. Fernandes. 2 months. Referral placed They make their own appointments    2. Follow up with Dr. Stephens Infectious disease of [facility] in 2-3 weeks follow up infection, abx, picc line  You have a Follow up Appointment with Dr. Stephens at [facility] on Tuesday August 10th  at 11:00 AM.  Address :  [address] [address]  If you have any questions about this appointment please call the clinic at Phone Number [phone]      3. Follow up with primary cardiologist at [facility] 2-3 weeks follow up aortic valve replacement and severe tricuspid regurgitation and lasix therapy  You have an appointment with your Cardiologist at the [facility] already:  You have a Follow up Appointment with Isa RONDON Cardiology at [facility] on Friday July 30th  at 2:55PM.  Address :  [address] [place] [address]  If you have any questions  about this appointment or need to reschedule it please call the clinic at Phone Number [phone]    4. Follow up primary care doctor 1 week after discharge from TCU with BMP   Your Primary Doctor is Dr. Heller at [facility] [phone]    5. BMP and phos level in 1 day and at 1 week following admission to TCU    6. Follow up with primary cardiologist [facility] regarding severe tricuspid regurgitation, bioprosthetic aortic valve 1-3 weeks following discharge from TCU.They can make any further follow appointments at  you July 30th appointment.        The plan is for the pt to discharge to TCU, [facility] in [place] , with a ride from her son Guilherme pham at 1pm.    Pratibha Charles RN, BSN Care Coordinator  [facility]  Mobile: [phone]

## 2021-07-08 NOTE — PLAN OF CARE
Physical Therapy Discharge Summary    Reason for therapy discharge:    Discharged to transitional care facility.    Progress towards therapy goal(s). See goals on Care Plan in Epic electronic health record for goal details.  Goals not met.  Barriers to achieving goals:   discharge from facility.    Therapy recommendation(s):    Continued therapy is recommended.  Rationale/Recommendations:  Patient would benefit from continued skilled PT to maximize her recovery and independence.

## 2021-07-09 LAB
ANION GAP SERPL CALCULATED.3IONS-SCNC: 11 MMOL/L (ref 5–18)
BACTERIA SPEC CULT: NO GROWTH
BACTERIA SPEC CULT: NO GROWTH
BUN SERPL-MCNC: 7 MG/DL (ref 8–28)
CALCIUM SERPL-MCNC: 8.2 MG/DL (ref 8.5–10.5)
CHLORIDE BLD-SCNC: 105 MMOL/L (ref 98–107)
CO2 SERPL-SCNC: 24 MMOL/L (ref 22–31)
CREAT SERPL-MCNC: 0.7 MG/DL (ref 0.6–1.1)
GFR SERPL CREATININE-BSD FRML MDRD: >60 ML/MIN/1.73M2
GLUCOSE BLD-MCNC: 84 MG/DL (ref 70–125)
Lab: NORMAL
PHOSPHATE SERPL-MCNC: 2.7 MG/DL (ref 2.5–4.5)
POTASSIUM BLD-SCNC: 3.5 MMOL/L (ref 3.5–5)
SODIUM SERPL-SCNC: 140 MMOL/L (ref 136–145)
SPECIMEN SOURCE: NORMAL
SPECIMEN SOURCE: NORMAL

## 2021-07-11 ENCOUNTER — LAB REQUISITION (OUTPATIENT)
Dept: LAB | Facility: CLINIC | Age: 86
End: 2021-07-11
Payer: MEDICARE

## 2021-07-11 DIAGNOSIS — I33.0 ACUTE AND SUBACUTE INFECTIVE ENDOCARDITIS: ICD-10-CM

## 2021-07-12 ENCOUNTER — LAB REQUISITION (OUTPATIENT)
Dept: LAB | Facility: CLINIC | Age: 86
End: 2021-07-12
Payer: COMMERCIAL

## 2021-07-12 DIAGNOSIS — I33.0 ACUTE AND SUBACUTE INFECTIVE ENDOCARDITIS: ICD-10-CM

## 2021-07-12 LAB
ALT SERPL W P-5'-P-CCNC: <9 U/L (ref 0–45)
BASOPHILS # BLD AUTO: 0.1 10E3/UL (ref 0–0.2)
BASOPHILS NFR BLD AUTO: 1 %
C REACTIVE PROTEIN LHE: 3.6 MG/DL (ref 0–0.8)
CREAT SERPL-MCNC: 0.92 MG/DL (ref 0.6–1.1)
EOSINOPHIL # BLD AUTO: 0.2 10E3/UL (ref 0–0.7)
EOSINOPHIL NFR BLD AUTO: 3 %
ERYTHROCYTE [DISTWIDTH] IN BLOOD BY AUTOMATED COUNT: 18.3 % (ref 10–15)
ERYTHROCYTE [SEDIMENTATION RATE] IN BLOOD BY WESTERGREN METHOD: 74 MM/HR (ref 0–20)
GFR SERPL CREATININE-BSD FRML MDRD: 55 ML/MIN/1.73M2
HCT VFR BLD AUTO: 27.5 % (ref 35–47)
HGB BLD-MCNC: 8.4 G/DL (ref 11.7–15.7)
IMM GRANULOCYTES # BLD: 0 10E3/UL
IMM GRANULOCYTES NFR BLD: 1 %
LYMPHOCYTES # BLD AUTO: 1.6 10E3/UL (ref 0.8–5.3)
LYMPHOCYTES NFR BLD AUTO: 21 %
MCH RBC QN AUTO: 26.3 PG (ref 26.5–33)
MCHC RBC AUTO-ENTMCNC: 30.5 G/DL (ref 31.5–36.5)
MCV RBC AUTO: 86 FL (ref 78–100)
MONOCYTES # BLD AUTO: 0.6 10E3/UL (ref 0–1.3)
MONOCYTES NFR BLD AUTO: 7 %
NEUTROPHILS # BLD AUTO: 5.4 10E3/UL (ref 1.6–8.3)
NEUTROPHILS NFR BLD AUTO: 67 %
NRBC # BLD AUTO: 0 10E3/UL
NRBC BLD AUTO-RTO: 0 /100
PLATELET # BLD AUTO: 319 10E3/UL (ref 150–450)
RBC # BLD AUTO: 3.2 10E6/UL (ref 3.8–5.2)
WBC # BLD AUTO: 7.9 10E3/UL (ref 4–11)

## 2021-07-12 PROCEDURE — 84460 ALANINE AMINO (ALT) (SGPT): CPT | Mod: ORL | Performed by: FAMILY MEDICINE

## 2021-07-12 PROCEDURE — 85652 RBC SED RATE AUTOMATED: CPT | Mod: ORL | Performed by: FAMILY MEDICINE

## 2021-07-12 PROCEDURE — 36415 COLL VENOUS BLD VENIPUNCTURE: CPT | Mod: ORL | Performed by: FAMILY MEDICINE

## 2021-07-12 PROCEDURE — 82565 ASSAY OF CREATININE: CPT | Mod: ORL | Performed by: FAMILY MEDICINE

## 2021-07-12 PROCEDURE — 85025 COMPLETE CBC W/AUTO DIFF WBC: CPT | Mod: ORL | Performed by: FAMILY MEDICINE

## 2021-07-12 PROCEDURE — P9604 ONE-WAY ALLOW PRORATED TRIP: HCPCS | Mod: ORL | Performed by: FAMILY MEDICINE

## 2021-07-12 PROCEDURE — 86141 C-REACTIVE PROTEIN HS: CPT | Mod: ORL | Performed by: FAMILY MEDICINE

## 2021-07-13 ENCOUNTER — LAB REQUISITION (OUTPATIENT)
Dept: LAB | Facility: CLINIC | Age: 86
End: 2021-07-13
Payer: MEDICARE

## 2021-07-13 DIAGNOSIS — E83.39 OTHER DISORDERS OF PHOSPHORUS METABOLISM: ICD-10-CM

## 2021-07-14 LAB
ANION GAP SERPL CALCULATED.3IONS-SCNC: 10 MMOL/L (ref 5–18)
BUN SERPL-MCNC: 7 MG/DL (ref 8–28)
CALCIUM SERPL-MCNC: 8.6 MG/DL (ref 8.5–10.5)
CHLORIDE BLD-SCNC: 104 MMOL/L (ref 98–107)
CO2 SERPL-SCNC: 27 MMOL/L (ref 22–31)
CREAT SERPL-MCNC: 0.78 MG/DL (ref 0.6–1.1)
GFR SERPL CREATININE-BSD FRML MDRD: 68 ML/MIN/1.73M2
GLUCOSE BLD-MCNC: 92 MG/DL (ref 70–125)
PHOSPHATE SERPL-MCNC: 3 MG/DL (ref 2.5–4.5)
POTASSIUM BLD-SCNC: 3.2 MMOL/L (ref 3.5–5)
SODIUM SERPL-SCNC: 141 MMOL/L (ref 136–145)

## 2021-07-14 PROCEDURE — 80048 BASIC METABOLIC PNL TOTAL CA: CPT | Mod: ORL | Performed by: FAMILY MEDICINE

## 2021-07-14 PROCEDURE — 36415 COLL VENOUS BLD VENIPUNCTURE: CPT | Mod: ORL | Performed by: FAMILY MEDICINE

## 2021-07-14 PROCEDURE — 84100 ASSAY OF PHOSPHORUS: CPT | Mod: ORL | Performed by: FAMILY MEDICINE

## 2021-07-16 ENCOUNTER — LAB REQUISITION (OUTPATIENT)
Dept: LAB | Facility: CLINIC | Age: 86
End: 2021-07-16
Payer: MEDICARE

## 2021-07-16 DIAGNOSIS — E87.6 HYPOKALEMIA: ICD-10-CM

## 2021-07-16 LAB
ANION GAP SERPL CALCULATED.3IONS-SCNC: 10 MMOL/L (ref 5–18)
BUN SERPL-MCNC: 6 MG/DL (ref 8–28)
CALCIUM SERPL-MCNC: 8.6 MG/DL (ref 8.5–10.5)
CHLORIDE BLD-SCNC: 106 MMOL/L (ref 98–107)
CO2 SERPL-SCNC: 27 MMOL/L (ref 22–31)
CREAT SERPL-MCNC: 0.77 MG/DL (ref 0.6–1.1)
GFR SERPL CREATININE-BSD FRML MDRD: 69 ML/MIN/1.73M2
GLUCOSE BLD-MCNC: 83 MG/DL (ref 70–125)
POTASSIUM BLD-SCNC: 3.7 MMOL/L (ref 3.5–5)
SODIUM SERPL-SCNC: 143 MMOL/L (ref 136–145)

## 2021-07-16 PROCEDURE — 80048 BASIC METABOLIC PNL TOTAL CA: CPT | Mod: ORL | Performed by: FAMILY MEDICINE

## 2021-07-16 PROCEDURE — 36415 COLL VENOUS BLD VENIPUNCTURE: CPT | Mod: ORL | Performed by: FAMILY MEDICINE

## 2021-07-16 PROCEDURE — P9604 ONE-WAY ALLOW PRORATED TRIP: HCPCS | Performed by: FAMILY MEDICINE

## 2021-07-19 ENCOUNTER — LAB REQUISITION (OUTPATIENT)
Dept: LAB | Facility: CLINIC | Age: 86
End: 2021-07-19
Payer: MEDICARE

## 2021-07-19 DIAGNOSIS — I33.0 ACUTE AND SUBACUTE INFECTIVE ENDOCARDITIS: ICD-10-CM

## 2021-07-19 LAB
ALT SERPL W P-5'-P-CCNC: 11 U/L (ref 0–45)
BASOPHILS # BLD AUTO: 0.1 10E3/UL (ref 0–0.2)
BASOPHILS NFR BLD AUTO: 1 %
C REACTIVE PROTEIN LHE: 2.4 MG/DL (ref 0–0.8)
CREAT SERPL-MCNC: 0.78 MG/DL (ref 0.6–1.1)
EOSINOPHIL # BLD AUTO: 0.3 10E3/UL (ref 0–0.7)
EOSINOPHIL NFR BLD AUTO: 5 %
ERYTHROCYTE [DISTWIDTH] IN BLOOD BY AUTOMATED COUNT: 18.6 % (ref 10–15)
ERYTHROCYTE [SEDIMENTATION RATE] IN BLOOD BY WESTERGREN METHOD: 65 MM/HR (ref 0–20)
GFR SERPL CREATININE-BSD FRML MDRD: 68 ML/MIN/1.73M2
HCT VFR BLD AUTO: 27.2 % (ref 35–47)
HGB BLD-MCNC: 8.3 G/DL (ref 11.7–15.7)
IMM GRANULOCYTES # BLD: 0 10E3/UL
IMM GRANULOCYTES NFR BLD: 1 %
LYMPHOCYTES # BLD AUTO: 1.5 10E3/UL (ref 0.8–5.3)
LYMPHOCYTES NFR BLD AUTO: 28 %
MCH RBC QN AUTO: 25.8 PG (ref 26.5–33)
MCHC RBC AUTO-ENTMCNC: 30.5 G/DL (ref 31.5–36.5)
MCV RBC AUTO: 85 FL (ref 78–100)
MONOCYTES # BLD AUTO: 0.5 10E3/UL (ref 0–1.3)
MONOCYTES NFR BLD AUTO: 8 %
NEUTROPHILS # BLD AUTO: 3.2 10E3/UL (ref 1.6–8.3)
NEUTROPHILS NFR BLD AUTO: 57 %
NRBC # BLD AUTO: 0 10E3/UL
NRBC BLD AUTO-RTO: 0 /100
PLATELET # BLD AUTO: 302 10E3/UL (ref 150–450)
RBC # BLD AUTO: 3.22 10E6/UL (ref 3.8–5.2)
WBC # BLD AUTO: 5.5 10E3/UL (ref 4–11)

## 2021-07-19 PROCEDURE — 84460 ALANINE AMINO (ALT) (SGPT): CPT | Performed by: FAMILY MEDICINE

## 2021-07-19 PROCEDURE — 82565 ASSAY OF CREATININE: CPT | Performed by: FAMILY MEDICINE

## 2021-07-19 PROCEDURE — P9603 ONE-WAY ALLOW PRORATED MILES: HCPCS | Mod: ORL | Performed by: FAMILY MEDICINE

## 2021-07-19 PROCEDURE — 86141 C-REACTIVE PROTEIN HS: CPT | Mod: ORL | Performed by: FAMILY MEDICINE

## 2021-07-19 PROCEDURE — 85652 RBC SED RATE AUTOMATED: CPT | Mod: ORL | Performed by: FAMILY MEDICINE

## 2021-07-19 PROCEDURE — 85025 COMPLETE CBC W/AUTO DIFF WBC: CPT | Mod: ORL | Performed by: FAMILY MEDICINE

## 2021-07-19 PROCEDURE — 36415 COLL VENOUS BLD VENIPUNCTURE: CPT | Mod: ORL | Performed by: FAMILY MEDICINE

## 2021-07-20 PROBLEM — I48.19 PERSISTENT ATRIAL FIBRILLATION (H): Status: ACTIVE | Noted: 2017-02-14

## 2021-07-20 PROBLEM — I63.9 CEREBELLAR STROKE (H): Status: ACTIVE | Noted: 2021-04-14

## 2021-07-20 PROBLEM — Z95.4 H/O AORTIC VALVE REPLACEMENT WITH TISSUE GRAFT: Status: ACTIVE | Noted: 2017-02-14

## 2021-07-20 PROBLEM — R51.9 HEADACHE: Status: ACTIVE | Noted: 2021-04-14

## 2021-07-20 PROBLEM — I63.342: Status: ACTIVE | Noted: 2021-04-08

## 2021-07-20 PROBLEM — R73.01 IMPAIRED FASTING GLUCOSE: Status: ACTIVE | Noted: 2018-10-15

## 2021-07-20 PROBLEM — R31.21 ASYMPTOMATIC MICROSCOPIC HEMATURIA: Status: ACTIVE | Noted: 2021-06-16

## 2021-07-20 PROBLEM — R11.2 NAUSEA AND VOMITING: Status: ACTIVE | Noted: 2021-04-07

## 2021-07-20 PROBLEM — Z86.73 HISTORY OF CVA (CEREBROVASCULAR ACCIDENT): Status: ACTIVE | Noted: 2021-03-15

## 2021-07-20 PROBLEM — D64.9 ANEMIA: Status: ACTIVE | Noted: 2021-03-08

## 2021-07-20 PROBLEM — G62.9 NEUROPATHY: Status: ACTIVE | Noted: 2021-03-21

## 2021-07-20 PROBLEM — R53.1 GENERALIZED WEAKNESS: Status: ACTIVE | Noted: 2021-04-07

## 2021-07-20 PROBLEM — T45.515A WARFARIN-INDUCED COAGULOPATHY (H): Status: ACTIVE | Noted: 2021-04-14

## 2021-07-20 PROBLEM — D50.9 IRON DEFICIENCY ANEMIA: Status: ACTIVE | Noted: 2021-06-16

## 2021-07-20 PROBLEM — E55.9 VITAMIN D INSUFFICIENCY: Status: ACTIVE | Noted: 2021-03-21

## 2021-07-20 PROBLEM — D68.32 WARFARIN-INDUCED COAGULOPATHY (H): Status: ACTIVE | Noted: 2021-04-14

## 2021-07-20 PROBLEM — M21.372 BILATERAL FOOT-DROP: Status: ACTIVE | Noted: 2021-03-21

## 2021-07-20 PROBLEM — Z98.890 S/P ASCENDING AORTIC ANEURYSM REPAIR: Status: ACTIVE | Noted: 2017-02-14

## 2021-07-20 PROBLEM — D72.829 LEUKOCYTOSIS: Status: ACTIVE | Noted: 2021-02-20

## 2021-07-20 PROBLEM — I25.10 NON-OCCLUSIVE CORONARY ARTERY DISEASE: Status: ACTIVE | Noted: 2020-11-09

## 2021-07-20 PROBLEM — N17.9 AKI (ACUTE KIDNEY INJURY) (H): Status: ACTIVE | Noted: 2021-06-16

## 2021-07-20 PROBLEM — K31.811 GASTROINTESTINAL HEMORRHAGE ASSOCIATED WITH ANGIODYSPLASIA OF STOMACH AND DUODENUM: Status: ACTIVE | Noted: 2021-06-20

## 2021-07-20 PROBLEM — Z86.79 S/P ASCENDING AORTIC ANEURYSM REPAIR: Status: ACTIVE | Noted: 2017-02-14

## 2021-07-20 PROBLEM — I63.9 CARDIOEMBOLIC STROKE (H): Status: ACTIVE | Noted: 2021-02-20

## 2021-07-20 PROBLEM — E78.5 HYPERLIPIDEMIA: Status: ACTIVE | Noted: 2017-09-13

## 2021-07-20 PROBLEM — R42 DIZZINESS: Status: ACTIVE | Noted: 2021-02-26

## 2021-07-20 PROBLEM — M21.371 BILATERAL FOOT-DROP: Status: ACTIVE | Noted: 2021-03-21

## 2021-07-26 ENCOUNTER — LAB REQUISITION (OUTPATIENT)
Dept: LAB | Facility: CLINIC | Age: 86
End: 2021-07-26
Payer: MEDICARE

## 2021-07-26 DIAGNOSIS — Z87.440 PERSONAL HISTORY OF URINARY (TRACT) INFECTIONS: ICD-10-CM

## 2021-07-26 DIAGNOSIS — T82.7XXD INFECTION AND INFLAMMATORY REACTION DUE TO OTHER CARDIAC AND VASCULAR DEVICES, IMPLANTS AND GRAFTS, SUBSEQUENT ENCOUNTER: ICD-10-CM

## 2021-07-26 DIAGNOSIS — I63.9 CEREBRAL INFARCTION, UNSPECIFIED (H): ICD-10-CM

## 2021-07-26 LAB
ALT SERPL W P-5'-P-CCNC: 13 U/L (ref 0–45)
BASOPHILS # BLD AUTO: 0.1 10E3/UL (ref 0–0.2)
BASOPHILS NFR BLD AUTO: 1 %
CREAT SERPL-MCNC: 0.82 MG/DL (ref 0.6–1.1)
CRP SERPL HS-MCNC: 13.5 MG/L (ref 0–3)
EOSINOPHIL # BLD AUTO: 0.6 10E3/UL (ref 0–0.7)
EOSINOPHIL NFR BLD AUTO: 9 %
ERYTHROCYTE [DISTWIDTH] IN BLOOD BY AUTOMATED COUNT: 18.8 % (ref 10–15)
GFR SERPL CREATININE-BSD FRML MDRD: 64 ML/MIN/1.73M2
HCT VFR BLD AUTO: 28.8 % (ref 35–47)
HGB BLD-MCNC: 8.7 G/DL (ref 11.7–15.7)
IMM GRANULOCYTES # BLD: 0 10E3/UL
IMM GRANULOCYTES NFR BLD: 0 %
LYMPHOCYTES # BLD AUTO: 1.7 10E3/UL (ref 0.8–5.3)
LYMPHOCYTES NFR BLD AUTO: 28 %
MCH RBC QN AUTO: 26.4 PG (ref 26.5–33)
MCHC RBC AUTO-ENTMCNC: 30.2 G/DL (ref 31.5–36.5)
MCV RBC AUTO: 87 FL (ref 78–100)
MONOCYTES # BLD AUTO: 0.6 10E3/UL (ref 0–1.3)
MONOCYTES NFR BLD AUTO: 9 %
NEUTROPHILS # BLD AUTO: 3.2 10E3/UL (ref 1.6–8.3)
NEUTROPHILS NFR BLD AUTO: 53 %
NRBC # BLD AUTO: 0 10E3/UL
NRBC BLD AUTO-RTO: 0 /100
PLATELET # BLD AUTO: 268 10E3/UL (ref 150–450)
RBC # BLD AUTO: 3.3 10E6/UL (ref 3.8–5.2)
WBC # BLD AUTO: 6 10E3/UL (ref 4–11)

## 2021-07-26 PROCEDURE — 86141 C-REACTIVE PROTEIN HS: CPT | Mod: ORL | Performed by: FAMILY MEDICINE

## 2021-07-26 PROCEDURE — 85025 COMPLETE CBC W/AUTO DIFF WBC: CPT | Mod: ORL | Performed by: FAMILY MEDICINE

## 2021-07-26 PROCEDURE — 82565 ASSAY OF CREATININE: CPT | Mod: ORL | Performed by: FAMILY MEDICINE

## 2021-07-26 PROCEDURE — 84460 ALANINE AMINO (ALT) (SGPT): CPT | Performed by: FAMILY MEDICINE

## 2021-07-26 PROCEDURE — 85652 RBC SED RATE AUTOMATED: CPT | Mod: ORL | Performed by: FAMILY MEDICINE

## 2021-07-27 ENCOUNTER — LAB REQUISITION (OUTPATIENT)
Dept: LAB | Facility: CLINIC | Age: 86
End: 2021-07-27
Payer: MEDICARE

## 2021-07-27 DIAGNOSIS — Z87.440 PERSONAL HISTORY OF URINARY (TRACT) INFECTIONS: ICD-10-CM

## 2021-07-27 DIAGNOSIS — I63.9 CEREBRAL INFARCTION, UNSPECIFIED (H): ICD-10-CM

## 2021-07-27 DIAGNOSIS — T82.7XXD INFECTION AND INFLAMMATORY REACTION DUE TO OTHER CARDIAC AND VASCULAR DEVICES, IMPLANTS AND GRAFTS, SUBSEQUENT ENCOUNTER: ICD-10-CM

## 2021-07-27 LAB
BASOPHILS # BLD AUTO: 0.1 10E3/UL (ref 0–0.2)
BASOPHILS NFR BLD AUTO: 1 %
EOSINOPHIL # BLD AUTO: 0.5 10E3/UL (ref 0–0.7)
EOSINOPHIL NFR BLD AUTO: 8 %
ERYTHROCYTE [DISTWIDTH] IN BLOOD BY AUTOMATED COUNT: 18.7 % (ref 10–15)
ERYTHROCYTE [SEDIMENTATION RATE] IN BLOOD BY WESTERGREN METHOD: 30 MM/HR (ref 0–20)
ERYTHROCYTE [SEDIMENTATION RATE] IN BLOOD BY WESTERGREN METHOD: 31 MM/HR (ref 0–20)
HCT VFR BLD AUTO: 28.3 % (ref 35–47)
HGB BLD-MCNC: 8.5 G/DL (ref 11.7–15.7)
IMM GRANULOCYTES # BLD: 0 10E3/UL
IMM GRANULOCYTES NFR BLD: 1 %
LYMPHOCYTES # BLD AUTO: 1.3 10E3/UL (ref 0.8–5.3)
LYMPHOCYTES NFR BLD AUTO: 23 %
MCH RBC QN AUTO: 26.2 PG (ref 26.5–33)
MCHC RBC AUTO-ENTMCNC: 30 G/DL (ref 31.5–36.5)
MCV RBC AUTO: 87 FL (ref 78–100)
MONOCYTES # BLD AUTO: 0.5 10E3/UL (ref 0–1.3)
MONOCYTES NFR BLD AUTO: 9 %
NEUTROPHILS # BLD AUTO: 3.2 10E3/UL (ref 1.6–8.3)
NEUTROPHILS NFR BLD AUTO: 58 %
NRBC # BLD AUTO: 0 10E3/UL
NRBC BLD AUTO-RTO: 0 /100
PLATELET # BLD AUTO: 258 10E3/UL (ref 150–450)
RBC # BLD AUTO: 3.24 10E6/UL (ref 3.8–5.2)
WBC # BLD AUTO: 5.6 10E3/UL (ref 4–11)

## 2021-07-27 PROCEDURE — P9603 ONE-WAY ALLOW PRORATED MILES: HCPCS | Mod: ORL | Performed by: FAMILY MEDICINE

## 2021-07-27 PROCEDURE — 85652 RBC SED RATE AUTOMATED: CPT | Mod: ORL | Performed by: FAMILY MEDICINE

## 2021-07-27 PROCEDURE — 36415 COLL VENOUS BLD VENIPUNCTURE: CPT | Mod: ORL | Performed by: FAMILY MEDICINE

## 2021-07-27 PROCEDURE — 85025 COMPLETE CBC W/AUTO DIFF WBC: CPT | Mod: ORL | Performed by: FAMILY MEDICINE

## 2021-07-29 ENCOUNTER — DOCUMENTATION ONLY (OUTPATIENT)
Dept: OTHER | Facility: CLINIC | Age: 86
End: 2021-07-29

## 2021-08-01 ENCOUNTER — LAB REQUISITION (OUTPATIENT)
Dept: LAB | Facility: CLINIC | Age: 86
End: 2021-08-01
Payer: MEDICARE

## 2021-08-01 DIAGNOSIS — I33.0 ACUTE AND SUBACUTE INFECTIVE ENDOCARDITIS: ICD-10-CM

## 2021-08-02 LAB
ALT SERPL W P-5'-P-CCNC: 18 U/L (ref 0–45)
ANION GAP SERPL CALCULATED.3IONS-SCNC: 12 MMOL/L (ref 5–18)
BASOPHILS # BLD AUTO: 0.1 10E3/UL (ref 0–0.2)
BASOPHILS NFR BLD AUTO: 3 %
BUN SERPL-MCNC: 6 MG/DL (ref 8–28)
C REACTIVE PROTEIN LHE: 1.2 MG/DL (ref 0–0.8)
CALCIUM SERPL-MCNC: 9.1 MG/DL (ref 8.5–10.5)
CHLORIDE BLD-SCNC: 105 MMOL/L (ref 98–107)
CO2 SERPL-SCNC: 25 MMOL/L (ref 22–31)
CREAT SERPL-MCNC: 0.76 MG/DL (ref 0.6–1.1)
CREAT SERPL-MCNC: 0.78 MG/DL (ref 0.6–1.1)
EOSINOPHIL # BLD AUTO: 0.4 10E3/UL (ref 0–0.7)
EOSINOPHIL NFR BLD AUTO: 8 %
ERYTHROCYTE [DISTWIDTH] IN BLOOD BY AUTOMATED COUNT: 18.4 % (ref 10–15)
ERYTHROCYTE [SEDIMENTATION RATE] IN BLOOD BY WESTERGREN METHOD: 28 MM/HR (ref 0–20)
GFR SERPL CREATININE-BSD FRML MDRD: 68 ML/MIN/1.73M2
GFR SERPL CREATININE-BSD FRML MDRD: 70 ML/MIN/1.73M2
GLUCOSE BLD-MCNC: 73 MG/DL (ref 70–125)
HCT VFR BLD AUTO: 28.7 % (ref 35–47)
HGB BLD-MCNC: 9.1 G/DL (ref 11.7–15.7)
IMM GRANULOCYTES # BLD: 0 10E3/UL
IMM GRANULOCYTES NFR BLD: 0 %
LYMPHOCYTES # BLD AUTO: 1.4 10E3/UL (ref 0.8–5.3)
LYMPHOCYTES NFR BLD AUTO: 27 %
MCH RBC QN AUTO: 27.5 PG (ref 26.5–33)
MCHC RBC AUTO-ENTMCNC: 31.7 G/DL (ref 31.5–36.5)
MCV RBC AUTO: 87 FL (ref 78–100)
MONOCYTES # BLD AUTO: 0.4 10E3/UL (ref 0–1.3)
MONOCYTES NFR BLD AUTO: 9 %
NEUTROPHILS # BLD AUTO: 2.8 10E3/UL (ref 1.6–8.3)
NEUTROPHILS NFR BLD AUTO: 53 %
NRBC # BLD AUTO: 0 10E3/UL
NRBC BLD AUTO-RTO: 0 /100
PLATELET # BLD AUTO: 255 10E3/UL (ref 150–450)
POTASSIUM BLD-SCNC: 3.6 MMOL/L (ref 3.5–5)
RBC # BLD AUTO: 3.31 10E6/UL (ref 3.8–5.2)
SODIUM SERPL-SCNC: 142 MMOL/L (ref 136–145)
WBC # BLD AUTO: 5.1 10E3/UL (ref 4–11)

## 2021-08-02 PROCEDURE — P9603 ONE-WAY ALLOW PRORATED MILES: HCPCS | Performed by: FAMILY MEDICINE

## 2021-08-02 PROCEDURE — 85652 RBC SED RATE AUTOMATED: CPT | Mod: ORL | Performed by: FAMILY MEDICINE

## 2021-08-02 PROCEDURE — 85025 COMPLETE CBC W/AUTO DIFF WBC: CPT | Mod: ORL | Performed by: FAMILY MEDICINE

## 2021-08-02 PROCEDURE — 86141 C-REACTIVE PROTEIN HS: CPT | Mod: ORL | Performed by: FAMILY MEDICINE

## 2021-08-02 PROCEDURE — 36415 COLL VENOUS BLD VENIPUNCTURE: CPT | Mod: ORL | Performed by: FAMILY MEDICINE

## 2021-08-02 PROCEDURE — 84460 ALANINE AMINO (ALT) (SGPT): CPT | Mod: ORL | Performed by: FAMILY MEDICINE

## 2021-08-02 PROCEDURE — 80048 BASIC METABOLIC PNL TOTAL CA: CPT | Mod: ORL | Performed by: FAMILY MEDICINE

## 2021-08-17 ENCOUNTER — LAB REQUISITION (OUTPATIENT)
Dept: LAB | Facility: CLINIC | Age: 86
End: 2021-08-17
Payer: MEDICARE

## 2021-08-17 DIAGNOSIS — T82.7XXD INFECTION AND INFLAMMATORY REACTION DUE TO OTHER CARDIAC AND VASCULAR DEVICES, IMPLANTS AND GRAFTS, SUBSEQUENT ENCOUNTER: ICD-10-CM

## 2021-08-18 ENCOUNTER — LAB REQUISITION (OUTPATIENT)
Dept: LAB | Facility: CLINIC | Age: 86
End: 2021-08-18
Payer: MEDICARE

## 2021-08-18 DIAGNOSIS — T82.7XXD INFECTION AND INFLAMMATORY REACTION DUE TO OTHER CARDIAC AND VASCULAR DEVICES, IMPLANTS AND GRAFTS, SUBSEQUENT ENCOUNTER: ICD-10-CM

## 2021-08-18 DIAGNOSIS — I63.9 CEREBRAL INFARCTION, UNSPECIFIED (H): ICD-10-CM

## 2021-08-18 DIAGNOSIS — Z87.440 PERSONAL HISTORY OF URINARY (TRACT) INFECTIONS: ICD-10-CM

## 2021-08-19 ENCOUNTER — LAB REQUISITION (OUTPATIENT)
Dept: LAB | Facility: CLINIC | Age: 86
End: 2021-08-19
Payer: MEDICARE

## 2021-08-19 DIAGNOSIS — I63.9 CEREBRAL INFARCTION, UNSPECIFIED (H): ICD-10-CM

## 2021-08-19 LAB
ANION GAP SERPL CALCULATED.3IONS-SCNC: 9 MMOL/L (ref 5–18)
BUN SERPL-MCNC: 12 MG/DL (ref 8–28)
CALCIUM SERPL-MCNC: 9.1 MG/DL (ref 8.5–10.5)
CHLORIDE BLD-SCNC: 108 MMOL/L (ref 98–107)
CO2 SERPL-SCNC: 24 MMOL/L (ref 22–31)
CREAT SERPL-MCNC: 0.81 MG/DL (ref 0.6–1.1)
GFR SERPL CREATININE-BSD FRML MDRD: 65 ML/MIN/1.73M2
GLUCOSE BLD-MCNC: 86 MG/DL (ref 70–125)
POTASSIUM BLD-SCNC: 4.4 MMOL/L (ref 3.5–5)
SODIUM SERPL-SCNC: 141 MMOL/L (ref 136–145)

## 2021-08-19 PROCEDURE — P9604 ONE-WAY ALLOW PRORATED TRIP: HCPCS | Mod: ORL | Performed by: FAMILY MEDICINE

## 2021-08-19 PROCEDURE — 36415 COLL VENOUS BLD VENIPUNCTURE: CPT | Mod: ORL | Performed by: FAMILY MEDICINE

## 2021-08-19 PROCEDURE — 87040 BLOOD CULTURE FOR BACTERIA: CPT | Mod: ORL,XS | Performed by: FAMILY MEDICINE

## 2021-08-19 PROCEDURE — 80048 BASIC METABOLIC PNL TOTAL CA: CPT | Mod: ORL | Performed by: FAMILY MEDICINE

## 2021-08-22 ENCOUNTER — HEALTH MAINTENANCE LETTER (OUTPATIENT)
Age: 86
End: 2021-08-22

## 2021-08-24 LAB
BACTERIA BLD CULT: NO GROWTH
BACTERIA BLD CULT: NO GROWTH

## 2021-08-30 ENCOUNTER — LAB REQUISITION (OUTPATIENT)
Dept: LAB | Facility: CLINIC | Age: 86
End: 2021-08-30
Payer: MEDICARE

## 2021-08-31 ENCOUNTER — LAB REQUISITION (OUTPATIENT)
Dept: LAB | Facility: CLINIC | Age: 86
End: 2021-08-31
Payer: MEDICARE

## 2021-08-31 DIAGNOSIS — T82.7XXD INFECTION AND INFLAMMATORY REACTION DUE TO OTHER CARDIAC AND VASCULAR DEVICES, IMPLANTS AND GRAFTS, SUBSEQUENT ENCOUNTER: ICD-10-CM

## 2021-08-31 PROCEDURE — 36415 COLL VENOUS BLD VENIPUNCTURE: CPT | Mod: ORL | Performed by: FAMILY MEDICINE

## 2021-08-31 PROCEDURE — 87040 BLOOD CULTURE FOR BACTERIA: CPT | Mod: ORL | Performed by: FAMILY MEDICINE

## 2021-09-01 PROCEDURE — P9604 ONE-WAY ALLOW PRORATED TRIP: HCPCS | Mod: ORL | Performed by: FAMILY MEDICINE

## 2021-09-01 PROCEDURE — 36415 COLL VENOUS BLD VENIPUNCTURE: CPT | Mod: ORL | Performed by: FAMILY MEDICINE

## 2021-09-01 PROCEDURE — 87040 BLOOD CULTURE FOR BACTERIA: CPT | Mod: ORL | Performed by: FAMILY MEDICINE

## 2021-09-05 LAB — BACTERIA BLD CULT: NO GROWTH

## 2021-09-06 LAB
BACTERIA BLD CULT: NO GROWTH
BACTERIA BLD CULT: NO GROWTH

## 2021-09-07 LAB — BACTERIA BLD CULT: NO GROWTH

## 2021-10-17 ENCOUNTER — HEALTH MAINTENANCE LETTER (OUTPATIENT)
Age: 86
End: 2021-10-17

## 2021-10-31 ASSESSMENT — ENCOUNTER SYMPTOMS
SORE THROAT: 0
SNORES LOUDLY: 0
SINUS PAIN: 0
TROUBLE SWALLOWING: 0
SMELL DISTURBANCE: 0
COUGH DISTURBING SLEEP: 0
COUGH: 1
WHEEZING: 0
SHORTNESS OF BREATH: 0
POSTURAL DYSPNEA: 0
HEMOPTYSIS: 0
HOARSE VOICE: 0
NECK MASS: 0
SPUTUM PRODUCTION: 0
SINUS CONGESTION: 1
DYSPNEA ON EXERTION: 0
TASTE DISTURBANCE: 0

## 2021-11-03 ENCOUNTER — OFFICE VISIT (OUTPATIENT)
Dept: NEUROLOGY | Facility: CLINIC | Age: 86
End: 2021-11-03
Payer: MEDICARE

## 2021-11-03 VITALS
DIASTOLIC BLOOD PRESSURE: 69 MMHG | OXYGEN SATURATION: 97 % | SYSTOLIC BLOOD PRESSURE: 109 MMHG | RESPIRATION RATE: 16 BRPM | HEART RATE: 65 BPM

## 2021-11-03 DIAGNOSIS — I10 ESSENTIAL HYPERTENSION: ICD-10-CM

## 2021-11-03 DIAGNOSIS — I48.20 CHRONIC ATRIAL FIBRILLATION (H): ICD-10-CM

## 2021-11-03 DIAGNOSIS — I63.12 CEREBROVASCULAR ACCIDENT (CVA) DUE TO EMBOLISM OF BASILAR ARTERY (H): Primary | ICD-10-CM

## 2021-11-03 PROCEDURE — 99215 OFFICE O/P EST HI 40 MIN: CPT | Performed by: PSYCHIATRY & NEUROLOGY

## 2021-11-03 RX ORDER — LISINOPRIL 2.5 MG/1
5 TABLET ORAL
COMMUNITY

## 2021-11-03 ASSESSMENT — PAIN SCALES - GENERAL: PAINLEVEL: NO PAIN (0)

## 2021-11-03 NOTE — PROGRESS NOTES
52 minutes spent on the date of the encounter doing chart review, review of test results, interpretation of tests, patient visit, documentation and discussion with family       José Miguel Fernandes MD  [facility] NEUROLOGY CLINIC [place]    _____________________________________________________________    [facility]    at [facility]  _____________________________________________________________      Chief Complaint: No chief complaint on file.      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 Emergent thrombectomy was not pursued due to her isolated symptoms. She was transferred for monitoring and potential intervention should her exam worsen.        Stroke Evaluation summarized:  MRI/Head CT: R pontomedullary junction infarct  Intracranial Vascular Imaging: partially occlusive thrombus of the proximal basilar artery     Repeat CTA: resolution of thrombus  Repeat MRI: Expected evolution of previous stroke; old left occipital, right frontal, left pontine infarcts  Cervical Carotid and Vertebral Artery Vascular Imaging: no stenoses  Echocardiogram: LVEF 55-60%, severe LA enlargement, moderate tricuspid regurg  EKG/Telemetry: a fib  LDL: 31  A1c: 5.8  Troponin: not obtained  Other testing: Not Applicable      Stroke etiology was presumed to be related to Afib. She was in TCU till August and has now transitioned to a facility. She is doing well and is using wheelchair for ambulation.          Impression:   Problem List Items Addressed This Visit   "      Nervous and Auditory    Stroke (H) - Primary    Relevant Orders    Physical Therapy Referral       Circulatory    Essential hypertension    Relevant Medications    lisinopril (ZESTRIL) 2.5 MG tablet      Other Visit Diagnoses     Chronic atrial fibrillation (H)                Plan:   - Continue apxiban  - Continue PTA medications  - PT for strength and ambulatory improvement  - Follow up in 1 year    Stroke Education provided.  She will call us with any questions.  For any acute neurologic deficits she was advised to  go directly to the hospital rather than call the clinic.    José Miguel Fernandes MD  Neurology  11/03/2021 8:24 AM  To page me or covering stroke neurology team member, click here: AMCOM  Choose \"On Call\" tab at top, then search dropdown box for \"Neurology Adult\" & press Enter, look for Neuro ICU/Stroke    ___________________________________________________________________    Current Medications  Current Outpatient Medications   Medication Sig     acetaminophen (TYLENOL) 325 MG tablet Take 650 mg by mouth every 8 hours as needed for mild pain     apixaban ANTICOAGULANT (ELIQUIS) 5 MG tablet Take 5 mg by mouth 2 times daily     atorvastatin (LIPITOR) 20 MG tablet Take 1 tablet (20 mg) by mouth every evening     cyanocobalamin (VITAMIN B-12) 1000 MCG tablet Take 1,000 mcg by mouth daily     ferrous sulfate (FEROSUL) 325 (65 Fe) MG tablet Take 325 mg by mouth three times a week Mon - Wed - Fri. Takes with orange juice.     folic acid (FOLVITE) 400 MCG tablet Take 2,000 mcg by mouth daily 400 mcg x 5 tablets for 1 month then on 7/18/21 decrease to 400 mcg daily     furosemide (LASIX) 20 MG tablet Take 1 tablet (20 mg) by mouth daily     latanoprost (XALATAN) 0.005 % ophthalmic solution Place 1 drop into both eyes At Bedtime     metoprolol tartrate (LOPRESSOR) 25 MG tablet Take 1 tablet (25 mg) by mouth 2 times daily     mirtazapine (REMERON SOL-TAB) 15 MG ODT 15 mg by Orally disintegrating tablet route At " Bedtime     ondansetron (ZOFRAN-ODT) 4 MG ODT tab Take 4 mg by mouth 2 times daily as needed for nausea     potassium chloride ER (KLOR-CON M) 20 MEQ CR tablet Take 2 tablets (40 mEq) by mouth daily     No current facility-administered medications for this visit.       Past Medical History  Past Medical History:   Diagnosis Date     Chronic atrial fibrillation -- on Eliquis      Gastric AVM's with bleeding 6/16/21      History of recurrent UTIs      History of strokes     Right frontal previous, left Cerebellar 4/14/21     S/P AVR (Bioprosthetic) and Ascending Aorta tube graft        Social History  Social History     Tobacco Use     Smoking status: Not on file   Substance Use Topics     Alcohol use: Not Currently     Drug use: Not on file       Family History  Family History   Problem Relation Age of Onset     Cerebrovascular Disease Mother      Other - See Comments Father         MVA, then suicide       ROS: 10 point relevant ROS neg other than the symptoms noted above in the HPI.    Physical Exam    There were no vitals taken for this visit.    General:  no acute distress  HEENT:  normocephalic/atraumatic  Pulmonary:  no respiratory distress    Neurologic  Mental Status:  alert, oriented x 3, follows commands, speech clear and fluent, naming and repetition normal  Cranial Nerves:  EOMI with normal smooth pursuit, facial movements symmetric, hearing not formally tested but intact to conversation, no dysarthria, shoulder shrug equal bilaterally, tongue protrusion midline  Motor:  no abnormal movements, able to move all limbs antigravity spontaneously with no signs of hemiparesis observed, no pronator drift  Reflexes:  1/4 in bilateral upper and lower extremities  Sensory:  Intact to light touch in bilateral upper and lower extremities  Coordination:  normal finger-to-nose and heel-to-shin bilaterally without dysmetria, rapid alternating movements symmetric  Station/Gait:  Deferred    Neuroimaging: as per  HPI  Labs:    Recent Labs   Lab Test 07/06/21  0513 07/05/21  1602 07/04/21  1534   INR 1.70* 2.18* 2.81*        Recent Labs   Lab Test 06/27/21  0725   CHOL 65   HDL 18*   LDL 31   TRIG 80       Recent Labs   Lab Test 07/02/21  0906   A1C 5.9*       No lab results found.    Answers for HPI/ROS submitted by the patient on 10/31/2021  General Symptoms: No  Skin Symptoms: No  HENT Symptoms: Yes  EYE SYMPTOMS: No  HEART SYMPTOMS: No  LUNG SYMPTOMS: Yes  INTESTINAL SYMPTOMS: No  URINARY SYMPTOMS: No  GYNECOLOGIC SYMPTOMS: No  BREAST SYMPTOMS: No  SKELETAL SYMPTOMS: No  BLOOD SYMPTOMS: No  NERVOUS SYSTEM SYMPTOMS: No  MENTAL HEALTH SYMPTOMS: No  Congestion: Yes   Voice hoarseness: No  Tooth pain: No  Gum tenderness: No  Bleeding gums: No  Change in taste: No  Change in sense of smell: No  Dry mouth: No  Hearing aid used: No  Neck lump: No  Sputum or phlegm: No  Coughing up blood: No  Snoring: No  Difficulty breathing on exertion: No  Nighttime Cough: No  Difficulty breathing when lying flat: No

## 2021-11-03 NOTE — PATIENT INSTRUCTIONS
Continue current medication as prescribed  Follow up in 1 year or sooner if needed  Physical therapy roger improve strength and ambulation

## 2021-11-03 NOTE — LETTER
11/3/2021       RE: Ekaterina Thayer  [address] [facility] [address]     Dear Colleague,    Thank you for referring your patient, Ekaterina Thayer, to the [facility]UR[facility] [place] at [facility]. Please see a copy of my visit note below.      52 minutes spent on the date of the encounter doing chart review, review of test results, interpretation of tests, patient visit, documentation and discussion with family       José Miguel Fernandes MD  [facility]UR[facility] [place]    _____________________________________________________________    [facility]    at [facility]  _____________________________________________________________      Chief Complaint: No chief complaint on file.      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 Emergent thrombectomy was not pursued due to her isolated symptoms. She was transferred for monitoring and potential intervention should her exam worsen.        Stroke Evaluation summarized:  MRI/Head CT: R pontomedullary junction infarct  Intracranial Vascular Imaging: partially occlusive thrombus of the proximal basilar artery     Repeat CTA: resolution of thrombus  Repeat MRI: Expected evolution of previous stroke; old left occipital, right frontal, left pontine infarcts  Cervical Carotid and Vertebral Artery Vascular Imaging: no stenoses  Echocardiogram: LVEF 55-60%, severe LA enlargement, moderate tricuspid regurg  EKG/Telemetry: a  "fib  LDL: 31  A1c: 5.8  Troponin: not obtained  Other testing: Not Applicable      Stroke etiology was presumed to be related to Afib. She was in TCU till August and has now transitioned to a facility. She is doing well and is using wheelchair for ambulation.          Impression:   Problem List Items Addressed This Visit        Nervous and Auditory    Stroke (H) - Primary    Relevant Orders    Physical Therapy Referral       Circulatory    Essential hypertension    Relevant Medications    lisinopril (ZESTRIL) 2.5 MG tablet      Other Visit Diagnoses     Chronic atrial fibrillation (H)                Plan:   - Continue apxiban  - Continue PTA medications  - PT for strength and ambulatory improvement  - Follow up in 1 year    Stroke Education provided.  She will call us with any questions.  For any acute neurologic deficits she was advised to  go directly to the hospital rather than call the clinic.    José Miguel Fernandes MD  Neurology  11/03/2021 8:24 AM  To page me or covering stroke neurology team member, click here: AMCOM  Choose \"On Call\" tab at top, then search dropdown box for \"Neurology Adult\" & press Enter, look for Neuro ICU/Stroke    ___________________________________________________________________    Current Medications  Current Outpatient Medications   Medication Sig     acetaminophen (TYLENOL) 325 MG tablet Take 650 mg by mouth every 8 hours as needed for mild pain     apixaban ANTICOAGULANT (ELIQUIS) 5 MG tablet Take 5 mg by mouth 2 times daily     atorvastatin (LIPITOR) 20 MG tablet Take 1 tablet (20 mg) by mouth every evening     cyanocobalamin (VITAMIN B-12) 1000 MCG tablet Take 1,000 mcg by mouth daily     ferrous sulfate (FEROSUL) 325 (65 Fe) MG tablet Take 325 mg by mouth three times a week Mon - Wed - Fri. Takes with orange juice.     folic acid (FOLVITE) 400 MCG tablet Take 2,000 mcg by mouth daily 400 mcg x 5 tablets for 1 month then on 7/18/21 decrease to 400 mcg daily     furosemide (LASIX) 20 " MG tablet Take 1 tablet (20 mg) by mouth daily     latanoprost (XALATAN) 0.005 % ophthalmic solution Place 1 drop into both eyes At Bedtime     metoprolol tartrate (LOPRESSOR) 25 MG tablet Take 1 tablet (25 mg) by mouth 2 times daily     mirtazapine (REMERON SOL-TAB) 15 MG ODT 15 mg by Orally disintegrating tablet route At Bedtime     ondansetron (ZOFRAN-ODT) 4 MG ODT tab Take 4 mg by mouth 2 times daily as needed for nausea     potassium chloride ER (KLOR-CON M) 20 MEQ CR tablet Take 2 tablets (40 mEq) by mouth daily     No current facility-administered medications for this visit.       Past Medical History  Past Medical History:   Diagnosis Date     Chronic atrial fibrillation -- on Eliquis      Gastric AVM's with bleeding 6/16/21      History of recurrent UTIs      History of strokes     Right frontal previous, left Cerebellar 4/14/21     S/P AVR (Bioprosthetic) and Ascending Aorta tube graft        Social History  Social History     Tobacco Use     Smoking status: Not on file   Substance Use Topics     Alcohol use: Not Currently     Drug use: Not on file       Family History  Family History   Problem Relation Age of Onset     Cerebrovascular Disease Mother      Other - See Comments Father         MVA, then suicide       ROS: 10 point relevant ROS neg other than the symptoms noted above in the HPI.    Physical Exam    There were no vitals taken for this visit.    General:  no acute distress  HEENT:  normocephalic/atraumatic  Pulmonary:  no respiratory distress    Neurologic  Mental Status:  alert, oriented x 3, follows commands, speech clear and fluent, naming and repetition normal  Cranial Nerves:  EOMI with normal smooth pursuit, facial movements symmetric, hearing not formally tested but intact to conversation, no dysarthria, shoulder shrug equal bilaterally, tongue protrusion midline  Motor:  no abnormal movements, able to move all limbs antigravity spontaneously with no signs of hemiparesis observed, no  pronator drift  Reflexes:  1/4 in bilateral upper and lower extremities  Sensory:  Intact to light touch in bilateral upper and lower extremities  Coordination:  normal finger-to-nose and heel-to-shin bilaterally without dysmetria, rapid alternating movements symmetric  Station/Gait:  Deferred    Neuroimaging: as per HPI  Labs:    Recent Labs   Lab Test 07/06/21  0513 07/05/21  1602 07/04/21  1534   INR 1.70* 2.18* 2.81*        Recent Labs   Lab Test 06/27/21  0725   CHOL 65   HDL 18*   LDL 31   TRIG 80       Recent Labs   Lab Test 07/02/21  0906   A1C 5.9*       No lab results found.    Answers for HPI/ROS submitted by the patient on 10/31/2021  General Symptoms: No  Skin Symptoms: No  HENT Symptoms: Yes  EYE SYMPTOMS: No  HEART SYMPTOMS: No  LUNG SYMPTOMS: Yes  INTESTINAL SYMPTOMS: No  URINARY SYMPTOMS: No  GYNECOLOGIC SYMPTOMS: No  BREAST SYMPTOMS: No  SKELETAL SYMPTOMS: No  BLOOD SYMPTOMS: No  NERVOUS SYSTEM SYMPTOMS: No  MENTAL HEALTH SYMPTOMS: No  Congestion: Yes   Voice hoarseness: No  Tooth pain: No  Gum tenderness: No  Bleeding gums: No  Change in taste: No  Change in sense of smell: No  Dry mouth: No  Hearing aid used: No  Neck lump: No  Sputum or phlegm: No  Coughing up blood: No  Snoring: No  Difficulty breathing on exertion: No  Nighttime Cough: No  Difficulty breathing when lying flat: No          Again, thank you for allowing me to participate in the care of your patient.      Sincerely,    José Miguel Fernandes MD

## 2021-11-11 ENCOUNTER — LAB REQUISITION (OUTPATIENT)
Dept: LAB | Facility: CLINIC | Age: 86
End: 2021-11-11
Payer: MEDICARE

## 2021-11-11 DIAGNOSIS — D64.9 ANEMIA, UNSPECIFIED: ICD-10-CM

## 2021-11-11 LAB
ANION GAP SERPL CALCULATED.3IONS-SCNC: 8 MMOL/L (ref 5–18)
BUN SERPL-MCNC: 15 MG/DL (ref 8–28)
CALCIUM SERPL-MCNC: 9 MG/DL (ref 8.5–10.5)
CHLORIDE BLD-SCNC: 111 MMOL/L (ref 98–107)
CO2 SERPL-SCNC: 22 MMOL/L (ref 22–31)
CREAT SERPL-MCNC: 0.8 MG/DL (ref 0.6–1.1)
GFR SERPL CREATININE-BSD FRML MDRD: 66 ML/MIN/1.73M2
GLUCOSE BLD-MCNC: 91 MG/DL (ref 70–125)
POTASSIUM BLD-SCNC: 4.1 MMOL/L (ref 3.5–5)
SODIUM SERPL-SCNC: 141 MMOL/L (ref 136–145)

## 2021-11-11 PROCEDURE — P9604 ONE-WAY ALLOW PRORATED TRIP: HCPCS | Mod: ORL | Performed by: NURSE PRACTITIONER

## 2021-11-11 PROCEDURE — 80048 BASIC METABOLIC PNL TOTAL CA: CPT | Mod: ORL | Performed by: NURSE PRACTITIONER

## 2021-11-11 PROCEDURE — 36415 COLL VENOUS BLD VENIPUNCTURE: CPT | Mod: ORL | Performed by: NURSE PRACTITIONER

## 2022-02-17 ENCOUNTER — LAB REQUISITION (OUTPATIENT)
Dept: LAB | Facility: CLINIC | Age: 87
End: 2022-02-17
Payer: MEDICARE

## 2022-02-17 DIAGNOSIS — D64.9 ANEMIA, UNSPECIFIED: ICD-10-CM

## 2022-02-17 DIAGNOSIS — I10 ESSENTIAL (PRIMARY) HYPERTENSION: ICD-10-CM

## 2022-02-17 DIAGNOSIS — I63.342: ICD-10-CM

## 2022-02-17 DIAGNOSIS — I25.10 ATHEROSCLEROTIC HEART DISEASE OF NATIVE CORONARY ARTERY WITHOUT ANGINA PECTORIS: ICD-10-CM

## 2022-02-17 LAB
ALBUMIN SERPL-MCNC: 2.9 G/DL (ref 3.5–5)
ALP SERPL-CCNC: 82 U/L (ref 45–120)
ALT SERPL W P-5'-P-CCNC: 32 U/L (ref 0–45)
ANION GAP SERPL CALCULATED.3IONS-SCNC: 2 MMOL/L (ref 5–18)
AST SERPL W P-5'-P-CCNC: 26 U/L (ref 0–40)
BILIRUB DIRECT SERPL-MCNC: 0.1 MG/DL
BILIRUB SERPL-MCNC: 0.3 MG/DL (ref 0–1)
BUN SERPL-MCNC: 20 MG/DL (ref 8–28)
CALCIUM SERPL-MCNC: 8.9 MG/DL (ref 8.5–10.5)
CHLORIDE BLD-SCNC: 100 MMOL/L (ref 98–107)
CO2 SERPL-SCNC: 23 MMOL/L (ref 22–31)
CREAT SERPL-MCNC: 0.77 MG/DL (ref 0.6–1.1)
ERYTHROCYTE [DISTWIDTH] IN BLOOD BY AUTOMATED COUNT: 15.6 % (ref 10–15)
GFR SERPL CREATININE-BSD FRML MDRD: 73 ML/MIN/1.73M2
GLUCOSE BLD-MCNC: 96 MG/DL (ref 70–125)
HCT VFR BLD AUTO: 30.6 % (ref 35–47)
HGB BLD-MCNC: 9.8 G/DL (ref 11.7–15.7)
MAGNESIUM SERPL-MCNC: 1.8 MG/DL (ref 1.8–2.6)
MCH RBC QN AUTO: 27.1 PG (ref 26.5–33)
MCHC RBC AUTO-ENTMCNC: 32 G/DL (ref 31.5–36.5)
MCV RBC AUTO: 85 FL (ref 78–100)
PLATELET # BLD AUTO: 207 10E3/UL (ref 150–450)
POTASSIUM BLD-SCNC: 4.1 MMOL/L (ref 3.5–5)
PROT SERPL-MCNC: 5.7 G/DL (ref 6–8)
RBC # BLD AUTO: 3.61 10E6/UL (ref 3.8–5.2)
SODIUM SERPL-SCNC: 125 MMOL/L (ref 136–145)
VIT B12 SERPL-MCNC: 1178 PG/ML (ref 213–816)
WBC # BLD AUTO: 5.4 10E3/UL (ref 4–11)

## 2022-02-17 PROCEDURE — 82607 VITAMIN B-12: CPT | Mod: ORL | Performed by: FAMILY MEDICINE

## 2022-02-17 PROCEDURE — 36415 COLL VENOUS BLD VENIPUNCTURE: CPT | Mod: ORL | Performed by: FAMILY MEDICINE

## 2022-02-17 PROCEDURE — 85027 COMPLETE CBC AUTOMATED: CPT | Mod: ORL | Performed by: FAMILY MEDICINE

## 2022-02-17 PROCEDURE — 83735 ASSAY OF MAGNESIUM: CPT | Mod: ORL | Performed by: FAMILY MEDICINE

## 2022-02-17 PROCEDURE — 82248 BILIRUBIN DIRECT: CPT | Mod: ORL | Performed by: FAMILY MEDICINE

## 2022-02-17 PROCEDURE — P9604 ONE-WAY ALLOW PRORATED TRIP: HCPCS | Mod: ORL | Performed by: FAMILY MEDICINE

## 2022-02-28 ENCOUNTER — LAB REQUISITION (OUTPATIENT)
Dept: LAB | Facility: CLINIC | Age: 87
End: 2022-02-28
Payer: MEDICARE

## 2022-02-28 DIAGNOSIS — I10 ESSENTIAL (PRIMARY) HYPERTENSION: ICD-10-CM

## 2022-02-28 DIAGNOSIS — D64.9 ANEMIA, UNSPECIFIED: ICD-10-CM

## 2022-02-28 LAB
ANION GAP SERPL CALCULATED.3IONS-SCNC: 9 MMOL/L (ref 5–18)
BUN SERPL-MCNC: 21 MG/DL (ref 8–28)
CALCIUM SERPL-MCNC: 9.1 MG/DL (ref 8.5–10.5)
CHLORIDE BLD-SCNC: 110 MMOL/L (ref 98–107)
CO2 SERPL-SCNC: 22 MMOL/L (ref 22–31)
CREAT SERPL-MCNC: 0.84 MG/DL (ref 0.6–1.1)
GFR SERPL CREATININE-BSD FRML MDRD: 66 ML/MIN/1.73M2
GLUCOSE BLD-MCNC: 88 MG/DL (ref 70–125)
POTASSIUM BLD-SCNC: 4.7 MMOL/L (ref 3.5–5)
SODIUM SERPL-SCNC: 141 MMOL/L (ref 136–145)

## 2022-02-28 PROCEDURE — P9604 ONE-WAY ALLOW PRORATED TRIP: HCPCS | Mod: ORL | Performed by: NURSE PRACTITIONER

## 2022-02-28 PROCEDURE — 80048 BASIC METABOLIC PNL TOTAL CA: CPT | Mod: ORL | Performed by: NURSE PRACTITIONER

## 2022-02-28 PROCEDURE — 36415 COLL VENOUS BLD VENIPUNCTURE: CPT | Mod: ORL | Performed by: NURSE PRACTITIONER

## 2022-05-14 ENCOUNTER — LAB REQUISITION (OUTPATIENT)
Dept: LAB | Facility: CLINIC | Age: 87
End: 2022-05-14
Payer: COMMERCIAL

## 2022-05-16 LAB
ANION GAP SERPL CALCULATED.3IONS-SCNC: 7 MMOL/L (ref 5–18)
BUN SERPL-MCNC: 16 MG/DL (ref 8–28)
CALCIUM SERPL-MCNC: 9.1 MG/DL (ref 8.5–10.5)
CHLORIDE BLD-SCNC: 108 MMOL/L (ref 98–107)
CO2 SERPL-SCNC: 25 MMOL/L (ref 22–31)
CREAT SERPL-MCNC: 0.84 MG/DL (ref 0.6–1.1)
ERYTHROCYTE [DISTWIDTH] IN BLOOD BY AUTOMATED COUNT: 13.9 % (ref 10–15)
GFR SERPL CREATININE-BSD FRML MDRD: 66 ML/MIN/1.73M2
GLUCOSE BLD-MCNC: 72 MG/DL (ref 70–125)
HCT VFR BLD AUTO: 33.9 % (ref 35–47)
HGB BLD-MCNC: 11 G/DL (ref 11.7–15.7)
MCH RBC QN AUTO: 28.4 PG (ref 26.5–33)
MCHC RBC AUTO-ENTMCNC: 32.4 G/DL (ref 31.5–36.5)
MCV RBC AUTO: 87 FL (ref 78–100)
PLATELET # BLD AUTO: 197 10E3/UL (ref 150–450)
POTASSIUM BLD-SCNC: 4.3 MMOL/L (ref 3.5–5)
RBC # BLD AUTO: 3.88 10E6/UL (ref 3.8–5.2)
SODIUM SERPL-SCNC: 140 MMOL/L (ref 136–145)
WBC # BLD AUTO: 4.5 10E3/UL (ref 4–11)

## 2022-05-16 PROCEDURE — 80048 BASIC METABOLIC PNL TOTAL CA: CPT | Mod: ORL | Performed by: FAMILY MEDICINE

## 2022-05-16 PROCEDURE — 85027 COMPLETE CBC AUTOMATED: CPT | Mod: ORL | Performed by: FAMILY MEDICINE

## 2022-05-16 PROCEDURE — 36415 COLL VENOUS BLD VENIPUNCTURE: CPT | Mod: ORL | Performed by: FAMILY MEDICINE

## 2022-05-16 PROCEDURE — P9604 ONE-WAY ALLOW PRORATED TRIP: HCPCS | Mod: ORL | Performed by: FAMILY MEDICINE

## 2022-05-18 ENCOUNTER — LAB REQUISITION (OUTPATIENT)
Dept: LAB | Facility: CLINIC | Age: 87
End: 2022-05-18
Payer: COMMERCIAL

## 2022-05-18 DIAGNOSIS — N39.0 URINARY TRACT INFECTION, SITE NOT SPECIFIED: ICD-10-CM

## 2022-05-18 LAB
ALBUMIN UR-MCNC: NEGATIVE MG/DL
APPEARANCE UR: CLEAR
BILIRUB UR QL STRIP: NEGATIVE
COLOR UR AUTO: YELLOW
GLUCOSE UR STRIP-MCNC: NEGATIVE MG/DL
HGB UR QL STRIP: NEGATIVE
KETONES UR STRIP-MCNC: NEGATIVE MG/DL
LEUKOCYTE ESTERASE UR QL STRIP: ABNORMAL
NITRATE UR QL: NEGATIVE
PH UR STRIP: 5 [PH] (ref 5–7)
RBC URINE: 1 /HPF
SP GR UR STRIP: 1.02 (ref 1–1.03)
SQUAMOUS EPITHELIAL: 1 /HPF
UROBILINOGEN UR STRIP-MCNC: <2 MG/DL
WBC URINE: 11 /HPF

## 2022-05-18 PROCEDURE — 87086 URINE CULTURE/COLONY COUNT: CPT | Mod: ORL | Performed by: NURSE PRACTITIONER

## 2022-05-18 PROCEDURE — 81001 URINALYSIS AUTO W/SCOPE: CPT | Mod: ORL | Performed by: NURSE PRACTITIONER

## 2022-05-20 LAB — BACTERIA UR CULT: NORMAL

## 2022-05-21 ENCOUNTER — LAB REQUISITION (OUTPATIENT)
Dept: LAB | Facility: CLINIC | Age: 87
End: 2022-05-21
Payer: COMMERCIAL

## 2022-05-23 LAB
ANION GAP SERPL CALCULATED.3IONS-SCNC: 7 MMOL/L (ref 5–18)
BUN SERPL-MCNC: 12 MG/DL (ref 8–28)
CALCIUM SERPL-MCNC: 9 MG/DL (ref 8.5–10.5)
CHLORIDE BLD-SCNC: 111 MMOL/L (ref 98–107)
CO2 SERPL-SCNC: 25 MMOL/L (ref 22–31)
CREAT SERPL-MCNC: 0.84 MG/DL (ref 0.6–1.1)
GFR SERPL CREATININE-BSD FRML MDRD: 66 ML/MIN/1.73M2
GLUCOSE BLD-MCNC: 81 MG/DL (ref 70–125)
POTASSIUM BLD-SCNC: 4.7 MMOL/L (ref 3.5–5)
SODIUM SERPL-SCNC: 143 MMOL/L (ref 136–145)

## 2022-05-23 PROCEDURE — P9604 ONE-WAY ALLOW PRORATED TRIP: HCPCS | Mod: ORL | Performed by: NURSE PRACTITIONER

## 2022-05-23 PROCEDURE — 80048 BASIC METABOLIC PNL TOTAL CA: CPT | Mod: ORL | Performed by: NURSE PRACTITIONER

## 2022-05-23 PROCEDURE — 36415 COLL VENOUS BLD VENIPUNCTURE: CPT | Mod: ORL | Performed by: NURSE PRACTITIONER

## 2022-05-29 ENCOUNTER — LAB REQUISITION (OUTPATIENT)
Dept: LAB | Facility: CLINIC | Age: 87
End: 2022-05-29
Payer: COMMERCIAL

## 2022-05-29 LAB
ALBUMIN UR-MCNC: 30 MG/DL
APPEARANCE UR: ABNORMAL
BACTERIA #/AREA URNS HPF: ABNORMAL /HPF
BILIRUB UR QL STRIP: NEGATIVE
COLOR UR AUTO: YELLOW
GLUCOSE UR STRIP-MCNC: NEGATIVE MG/DL
HGB UR QL STRIP: ABNORMAL
HYALINE CASTS: 2 /LPF
KETONES UR STRIP-MCNC: NEGATIVE MG/DL
LEUKOCYTE ESTERASE UR QL STRIP: ABNORMAL
NITRATE UR QL: POSITIVE
PH UR STRIP: 6 [PH] (ref 5–7)
RBC URINE: 108 /HPF
SP GR UR STRIP: 1.01 (ref 1–1.03)
SQUAMOUS EPITHELIAL: <1 /HPF
UROBILINOGEN UR STRIP-MCNC: <2 MG/DL
WBC URINE: >182 /HPF

## 2022-05-29 PROCEDURE — 81001 URINALYSIS AUTO W/SCOPE: CPT | Mod: ORL | Performed by: NURSE PRACTITIONER

## 2022-05-29 PROCEDURE — 87086 URINE CULTURE/COLONY COUNT: CPT | Mod: ORL | Performed by: NURSE PRACTITIONER

## 2022-05-31 LAB — BACTERIA UR CULT: NORMAL

## 2022-10-01 ENCOUNTER — HEALTH MAINTENANCE LETTER (OUTPATIENT)
Age: 87
End: 2022-10-01

## 2022-10-04 PROBLEM — R91.1 SOLITARY PULMONARY NODULE: Status: ACTIVE | Noted: 2019-10-16

## 2022-10-10 ENCOUNTER — LAB REQUISITION (OUTPATIENT)
Dept: LAB | Facility: CLINIC | Age: 87
End: 2022-10-10
Payer: COMMERCIAL

## 2022-10-10 DIAGNOSIS — I63.342: ICD-10-CM

## 2022-10-11 LAB
ALBUMIN SERPL BCG-MCNC: 3.3 G/DL (ref 3.5–5.2)
ALP SERPL-CCNC: 75 U/L (ref 35–104)
ALT SERPL W P-5'-P-CCNC: 18 U/L (ref 10–35)
ANION GAP SERPL CALCULATED.3IONS-SCNC: 8 MMOL/L (ref 7–15)
AST SERPL W P-5'-P-CCNC: 25 U/L (ref 10–35)
BILIRUB DIRECT SERPL-MCNC: <0.2 MG/DL (ref 0–0.3)
BILIRUB SERPL-MCNC: 0.4 MG/DL
BUN SERPL-MCNC: 20 MG/DL (ref 8–23)
CALCIUM SERPL-MCNC: 8.9 MG/DL (ref 8.2–9.6)
CHLORIDE SERPL-SCNC: 109 MMOL/L (ref 98–107)
CREAT SERPL-MCNC: 0.83 MG/DL (ref 0.51–0.95)
DEPRECATED HCO3 PLAS-SCNC: 23 MMOL/L (ref 22–29)
ERYTHROCYTE [DISTWIDTH] IN BLOOD BY AUTOMATED COUNT: 13.6 % (ref 10–15)
GFR SERPL CREATININE-BSD FRML MDRD: 67 ML/MIN/1.73M2
GLUCOSE SERPL-MCNC: 81 MG/DL (ref 70–99)
HCT VFR BLD AUTO: 32.1 % (ref 35–47)
HGB BLD-MCNC: 10.2 G/DL (ref 11.7–15.7)
MAGNESIUM SERPL-MCNC: 1.9 MG/DL (ref 1.7–2.3)
MCH RBC QN AUTO: 28.5 PG (ref 26.5–33)
MCHC RBC AUTO-ENTMCNC: 31.8 G/DL (ref 31.5–36.5)
MCV RBC AUTO: 90 FL (ref 78–100)
PLATELET # BLD AUTO: 194 10E3/UL (ref 150–450)
POTASSIUM SERPL-SCNC: 4.5 MMOL/L (ref 3.4–5.3)
PROT SERPL-MCNC: 5.5 G/DL (ref 6.4–8.3)
RBC # BLD AUTO: 3.58 10E6/UL (ref 3.8–5.2)
SODIUM SERPL-SCNC: 140 MMOL/L (ref 136–145)
TSH SERPL DL<=0.005 MIU/L-ACNC: 3.98 UIU/ML (ref 0.3–4.2)
WBC # BLD AUTO: 5.2 10E3/UL (ref 4–11)

## 2022-10-11 PROCEDURE — 82248 BILIRUBIN DIRECT: CPT | Mod: ORL | Performed by: FAMILY MEDICINE

## 2022-10-11 PROCEDURE — 36415 COLL VENOUS BLD VENIPUNCTURE: CPT | Mod: ORL | Performed by: FAMILY MEDICINE

## 2022-10-11 PROCEDURE — 85027 COMPLETE CBC AUTOMATED: CPT | Mod: ORL | Performed by: FAMILY MEDICINE

## 2022-10-11 PROCEDURE — 84443 ASSAY THYROID STIM HORMONE: CPT | Mod: ORL | Performed by: FAMILY MEDICINE

## 2022-10-11 PROCEDURE — 83735 ASSAY OF MAGNESIUM: CPT | Mod: ORL | Performed by: FAMILY MEDICINE

## 2022-10-11 PROCEDURE — 80053 COMPREHEN METABOLIC PANEL: CPT | Mod: ORL | Performed by: FAMILY MEDICINE

## 2022-10-11 PROCEDURE — P9603 ONE-WAY ALLOW PRORATED MILES: HCPCS | Mod: ORL | Performed by: FAMILY MEDICINE

## 2022-11-18 ENCOUNTER — LAB REQUISITION (OUTPATIENT)
Dept: LAB | Facility: CLINIC | Age: 87
End: 2022-11-18
Payer: COMMERCIAL

## 2022-11-18 DIAGNOSIS — Z01.818 ENCOUNTER FOR OTHER PREPROCEDURAL EXAMINATION: ICD-10-CM

## 2022-11-18 PROCEDURE — U0003 INFECTIOUS AGENT DETECTION BY NUCLEIC ACID (DNA OR RNA); SEVERE ACUTE RESPIRATORY SYNDROME CORONAVIRUS 2 (SARS-COV-2) (CORONAVIRUS DISEASE [COVID-19]), AMPLIFIED PROBE TECHNIQUE, MAKING USE OF HIGH THROUGHPUT TECHNOLOGIES AS DESCRIBED BY CMS-2020-01-R: HCPCS | Mod: ORL | Performed by: FAMILY MEDICINE

## 2022-11-19 ENCOUNTER — LAB REQUISITION (OUTPATIENT)
Dept: LAB | Facility: CLINIC | Age: 87
End: 2022-11-19
Payer: COMMERCIAL

## 2022-11-19 DIAGNOSIS — I10 ESSENTIAL (PRIMARY) HYPERTENSION: ICD-10-CM

## 2022-11-19 LAB — SARS-COV-2 RNA RESP QL NAA+PROBE: NEGATIVE

## 2022-11-20 ENCOUNTER — LAB REQUISITION (OUTPATIENT)
Dept: LAB | Facility: CLINIC | Age: 87
End: 2022-11-20
Payer: COMMERCIAL

## 2022-11-20 DIAGNOSIS — I10 ESSENTIAL (PRIMARY) HYPERTENSION: ICD-10-CM

## 2022-11-21 LAB
ANION GAP SERPL CALCULATED.3IONS-SCNC: 11 MMOL/L (ref 7–15)
BUN SERPL-MCNC: 17.6 MG/DL (ref 8–23)
CALCIUM SERPL-MCNC: 9 MG/DL (ref 8.2–9.6)
CHLORIDE SERPL-SCNC: 109 MMOL/L (ref 98–107)
CREAT SERPL-MCNC: 0.83 MG/DL (ref 0.51–0.95)
DEPRECATED HCO3 PLAS-SCNC: 22 MMOL/L (ref 22–29)
ERYTHROCYTE [DISTWIDTH] IN BLOOD BY AUTOMATED COUNT: 13.5 % (ref 10–15)
GFR SERPL CREATININE-BSD FRML MDRD: 67 ML/MIN/1.73M2
GLUCOSE SERPL-MCNC: 84 MG/DL (ref 70–99)
HCT VFR BLD AUTO: 33.2 % (ref 35–47)
HGB BLD-MCNC: 10.3 G/DL (ref 11.7–15.7)
MCH RBC QN AUTO: 28.8 PG (ref 26.5–33)
MCHC RBC AUTO-ENTMCNC: 31 G/DL (ref 31.5–36.5)
MCV RBC AUTO: 93 FL (ref 78–100)
PLATELET # BLD AUTO: 189 10E3/UL (ref 150–450)
POTASSIUM SERPL-SCNC: 4.7 MMOL/L (ref 3.4–5.3)
RBC # BLD AUTO: 3.58 10E6/UL (ref 3.8–5.2)
SODIUM SERPL-SCNC: 142 MMOL/L (ref 136–145)
WBC # BLD AUTO: 5.5 10E3/UL (ref 4–11)

## 2022-11-21 PROCEDURE — 85027 COMPLETE CBC AUTOMATED: CPT | Mod: ORL | Performed by: FAMILY MEDICINE

## 2022-11-21 PROCEDURE — 80048 BASIC METABOLIC PNL TOTAL CA: CPT | Mod: ORL | Performed by: FAMILY MEDICINE

## 2022-11-21 PROCEDURE — 36415 COLL VENOUS BLD VENIPUNCTURE: CPT | Mod: ORL | Performed by: FAMILY MEDICINE

## 2022-11-21 PROCEDURE — P9604 ONE-WAY ALLOW PRORATED TRIP: HCPCS | Mod: ORL | Performed by: FAMILY MEDICINE

## 2022-12-26 ENCOUNTER — LAB REQUISITION (OUTPATIENT)
Dept: LAB | Facility: CLINIC | Age: 87
End: 2022-12-26
Payer: COMMERCIAL

## 2022-12-26 LAB
FLUAV RNA SPEC QL NAA+PROBE: NEGATIVE
FLUBV RNA RESP QL NAA+PROBE: NEGATIVE
RSV RNA SPEC NAA+PROBE: NEGATIVE
SARS-COV-2 RNA RESP QL NAA+PROBE: NEGATIVE

## 2022-12-26 PROCEDURE — 87637 SARSCOV2&INF A&B&RSV AMP PRB: CPT | Mod: ORL | Performed by: FAMILY MEDICINE

## 2023-02-15 ENCOUNTER — OFFICE VISIT (OUTPATIENT)
Dept: NEUROLOGY | Facility: CLINIC | Age: 88
End: 2023-02-15
Payer: COMMERCIAL

## 2023-02-15 VITALS
RESPIRATION RATE: 16 BRPM | SYSTOLIC BLOOD PRESSURE: 152 MMHG | HEART RATE: 67 BPM | OXYGEN SATURATION: 97 % | DIASTOLIC BLOOD PRESSURE: 96 MMHG

## 2023-02-15 DIAGNOSIS — I63.12 CEREBROVASCULAR ACCIDENT (CVA) DUE TO EMBOLISM OF BASILAR ARTERY (H): Primary | ICD-10-CM

## 2023-02-15 PROCEDURE — 99213 OFFICE O/P EST LOW 20 MIN: CPT | Performed by: PSYCHIATRY & NEUROLOGY

## 2023-02-15 RX ORDER — SENNOSIDES A AND B 8.6 MG/1
1 TABLET, FILM COATED ORAL DAILY
COMMUNITY

## 2023-02-15 RX ORDER — PANTOPRAZOLE SODIUM 40 MG/1
40 TABLET, DELAYED RELEASE ORAL
COMMUNITY
Start: 2021-07-08

## 2023-02-15 RX ORDER — DEXTROMETHORPHAN HBR. AND GUAIFENESIN 10; 100 MG/5ML; MG/5ML
10-20 SOLUTION ORAL
COMMUNITY

## 2023-02-15 RX ORDER — PREDNISOLONE ACETATE 10 MG/ML
SUSPENSION/ DROPS OPHTHALMIC
COMMUNITY
Start: 2022-12-23

## 2023-02-15 ASSESSMENT — PAIN SCALES - GENERAL: PAINLEVEL: NO PAIN (0)

## 2023-02-15 NOTE — PROGRESS NOTES
25 minutes spent on the date of the encounter doing chart review, review of test results, interpretation of tests, patient visit, documentation and discussion with family       José Miguel Fernandes MD  [facility] NEUROLOGY CLINIC [place]    _____________________________________________________________    [facility]    at [facility] and [facility]  _____________________________________________________________      Chief Complaint: No chief complaint on file.      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 Emergent thrombectomy was not pursued due to her isolated symptoms. She was transferred for monitoring and potential intervention should her exam worsen.        Stroke Evaluation summarized:  MRI/Head CT: R pontomedullary junction infarct  Intracranial Vascular Imaging: partially occlusive thrombus of the proximal basilar artery     Repeat CTA: resolution of thrombus  Repeat MRI: Expected evolution of previous stroke; old left occipital, right frontal, left pontine infarcts  Cervical Carotid and Vertebral Artery Vascular Imaging: no stenoses  Echocardiogram: LVEF 55-60%, severe LA enlargement, moderate tricuspid regurg  EKG/Telemetry: a fib  LDL: 31  A1c: 5.8  Troponin: not obtained  Other testing: Not Applicable      Stroke etiology was presumed to be related to Afib. She was in TCU till August and has now transitioned to a facility. She is doing well and is using wheelchair for ambulation.        mRS 3      Impression:   Problem List Items Addressed This  "Visit        Nervous and Auditory    Stroke (H) - Primary         Plan:   - Continue apxiban  - Continue PTA medications  - Follow up as needed    Stroke Education provided.  She will call us with any questions.  For any acute neurologic deficits she was advised to  go directly to the hospital rather than call the clinic.    José Miguel Fernandes MD  Neurology  02/15/2023 9:42 AM  To page me or covering stroke neurology team member, click here: AMCOM  Choose \"On Call\" tab at top, then search dropdown box for \"Neurology Adult\" & press Enter, look for Neuro ICU/Stroke    ___________________________________________________________________    Current Medications  Current Outpatient Medications   Medication Sig     acetaminophen (TYLENOL) 325 MG tablet Take 650 mg by mouth every 8 hours as needed for mild pain     apixaban ANTICOAGULANT (ELIQUIS) 5 MG tablet Take 5 mg by mouth 2 times daily     atorvastatin (LIPITOR) 20 MG tablet Take 1 tablet (20 mg) by mouth every evening     cyanocobalamin (VITAMIN B-12) 1000 MCG tablet Take 1,000 mcg by mouth daily     ferrous sulfate (FEROSUL) 325 (65 Fe) MG tablet Take 325 mg by mouth three times a week Mon - Wed - Fri. Takes with orange juice.     folic acid (FOLVITE) 400 MCG tablet Take 2,000 mcg by mouth daily 400 mcg x 5 tablets for 1 month then on 7/18/21 decrease to 400 mcg daily     furosemide (LASIX) 20 MG tablet Take 1 tablet (20 mg) by mouth daily (Patient not taking: Reported on 11/3/2021)     latanoprost (XALATAN) 0.005 % ophthalmic solution Place 1 drop into both eyes At Bedtime     lisinopril (ZESTRIL) 2.5 MG tablet Take 5 mg by mouth     metoprolol tartrate (LOPRESSOR) 25 MG tablet Take 1 tablet (25 mg) by mouth 2 times daily     mirtazapine (REMERON SOL-TAB) 15 MG ODT 15 mg by Orally disintegrating tablet route At Bedtime     ondansetron (ZOFRAN-ODT) 4 MG ODT tab Take 4 mg by mouth 2 times daily as needed for nausea     potassium chloride ER (KLOR-CON M) 20 MEQ CR tablet " Take 2 tablets (40 mEq) by mouth daily     No current facility-administered medications for this visit.       Past Medical History  Past Medical History:   Diagnosis Date     Chronic atrial fibrillation -- on Eliquis      Gastric AVM's with bleeding 6/16/21      History of recurrent UTIs      History of strokes     Right frontal previous, left Cerebellar 4/14/21     S/P AVR (Bioprosthetic) and Ascending Aorta tube graft        Social History  Social History     Substance Use Topics     Alcohol use: Not Currently       Family History  Family History   Problem Relation Age of Onset     Cerebrovascular Disease Mother      Other - See Comments Father         MVA, then suicide       ROS: 10 point relevant ROS neg other than the symptoms noted above in the HPI.    Physical Exam    BP (!) 152/96   Pulse 67   Resp 16   SpO2 97%     General:  no acute distress  HEENT:  normocephalic/atraumatic  Pulmonary:  no respiratory distress    Neurologic  Mental Status:  alert, oriented x 3, follows commands, speech clear and fluent, naming and repetition normal  Cranial Nerves:  EOMI with normal smooth pursuit, facial movements symmetric, hearing not formally tested but intact to conversation, no dysarthria, shoulder shrug equal bilaterally, tongue protrusion midline  Motor:  no abnormal movements, able to move all limbs antigravity spontaneously with no signs of hemiparesis observed, no pronator drift  Reflexes:  1/4 in bilateral upper and lower extremities  Sensory:  Intact to light touch in bilateral upper and lower extremities  Coordination:  normal finger-to-nose and heel-to-shin bilaterally without dysmetria, rapid alternating movements symmetric  Station/Gait:  Deferred    Neuroimaging: as per HPI  Labs:    Recent Labs   Lab Test 07/06/21  0513 07/05/21  1602 07/04/21  1534   INR 1.70* 2.18* 2.81*        Recent Labs   Lab Test 06/27/21  0725   CHOL 65   HDL 18*   LDL 31   TRIG 80       Recent Labs   Lab Test 07/02/21  0906    A1C 5.9*       No lab results found.    Answers for HPI/ROS submitted by the patient on 10/31/2021  General Symptoms: No  Skin Symptoms: No  HENT Symptoms: Yes  EYE SYMPTOMS: No  HEART SYMPTOMS: No  LUNG SYMPTOMS: Yes  INTESTINAL SYMPTOMS: No  URINARY SYMPTOMS: No  GYNECOLOGIC SYMPTOMS: No  BREAST SYMPTOMS: No  SKELETAL SYMPTOMS: No  BLOOD SYMPTOMS: No  NERVOUS SYSTEM SYMPTOMS: No  MENTAL HEALTH SYMPTOMS: No  Congestion: Yes   Voice hoarseness: No  Tooth pain: No  Gum tenderness: No  Bleeding gums: No  Change in taste: No  Change in sense of smell: No  Dry mouth: No  Hearing aid used: No  Neck lump: No  Sputum or phlegm: No  Coughing up blood: No  Snoring: No  Difficulty breathing on exertion: No  Nighttime Cough: No  Difficulty breathing when lying flat: No

## 2023-02-15 NOTE — LETTER
2/15/2023       RE: Ekaterina Thayer  [address] [facility] [address]     Dear Colleague,    Thank you for referring your patient, Ekaterina Thayer, to the [facility] [place] at [facility]. Please see a copy of my visit note below.    25 minutes spent on the date of the encounter doing chart review, review of test results, interpretation of tests, patient visit, documentation and discussion with family       José Miguel Fernandes MD  [facility] [place]    _____________________________________________________________    [facility]    at [facility] and [facility]  _____________________________________________________________      Chief Complaint: No chief complaint on file.      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 Emergent thrombectomy was not pursued due to her isolated symptoms. She was transferred for monitoring and potential intervention should her exam worsen.        Stroke Evaluation summarized:  MRI/Head CT: R pontomedullary junction infarct  Intracranial Vascular Imaging: partially occlusive thrombus of the proximal basilar artery     Repeat CTA: resolution of thrombus  Repeat MRI: Expected evolution of previous stroke; old left occipital, right frontal, left pontine infarcts  Cervical Carotid and Vertebral Artery Vascular Imaging: no stenoses  Echocardiogram: LVEF 55-60%, severe LA enlargement, moderate tricuspid regurg  EKG/Telemetry: a fib  LDL:  "31  A1c: 5.8  Troponin: not obtained  Other testing: Not Applicable      Stroke etiology was presumed to be related to Afib. She was in TCU till August and has now transitioned to a facility. She is doing well and is using wheelchair for ambulation.        mRS 3      Impression:   Problem List Items Addressed This Visit        Nervous and Auditory    Stroke (H) - Primary         Plan:   - Continue apxiban  - Continue PTA medications  - Follow up as needed    Stroke Education provided.  She will call us with any questions.  For any acute neurologic deficits she was advised to  go directly to the hospital rather than call the clinic.    José Miguel Fernandes MD  Neurology  02/15/2023 9:42 AM  To page me or covering stroke neurology team member, click here: AMCOM  Choose \"On Call\" tab at top, then search dropdown box for \"Neurology Adult\" & press Enter, look for Neuro ICU/Stroke    ___________________________________________________________________    Current Medications  Current Outpatient Medications   Medication Sig     acetaminophen (TYLENOL) 325 MG tablet Take 650 mg by mouth every 8 hours as needed for mild pain     apixaban ANTICOAGULANT (ELIQUIS) 5 MG tablet Take 5 mg by mouth 2 times daily     atorvastatin (LIPITOR) 20 MG tablet Take 1 tablet (20 mg) by mouth every evening     cyanocobalamin (VITAMIN B-12) 1000 MCG tablet Take 1,000 mcg by mouth daily     ferrous sulfate (FEROSUL) 325 (65 Fe) MG tablet Take 325 mg by mouth three times a week Mon - Wed - Fri. Takes with orange juice.     folic acid (FOLVITE) 400 MCG tablet Take 2,000 mcg by mouth daily 400 mcg x 5 tablets for 1 month then on 7/18/21 decrease to 400 mcg daily     furosemide (LASIX) 20 MG tablet Take 1 tablet (20 mg) by mouth daily (Patient not taking: Reported on 11/3/2021)     latanoprost (XALATAN) 0.005 % ophthalmic solution Place 1 drop into both eyes At Bedtime     lisinopril (ZESTRIL) 2.5 MG tablet Take 5 mg by mouth     metoprolol tartrate " (LOPRESSOR) 25 MG tablet Take 1 tablet (25 mg) by mouth 2 times daily     mirtazapine (REMERON SOL-TAB) 15 MG ODT 15 mg by Orally disintegrating tablet route At Bedtime     ondansetron (ZOFRAN-ODT) 4 MG ODT tab Take 4 mg by mouth 2 times daily as needed for nausea     potassium chloride ER (KLOR-CON M) 20 MEQ CR tablet Take 2 tablets (40 mEq) by mouth daily     No current facility-administered medications for this visit.       Past Medical History  Past Medical History:   Diagnosis Date     Chronic atrial fibrillation -- on Eliquis      Gastric AVM's with bleeding 6/16/21      History of recurrent UTIs      History of strokes     Right frontal previous, left Cerebellar 4/14/21     S/P AVR (Bioprosthetic) and Ascending Aorta tube graft        Social History  Social History     Substance Use Topics     Alcohol use: Not Currently       Family History  Family History   Problem Relation Age of Onset     Cerebrovascular Disease Mother      Other - See Comments Father         MVA, then suicide       ROS: 10 point relevant ROS neg other than the symptoms noted above in the HPI.    Physical Exam    BP (!) 152/96   Pulse 67   Resp 16   SpO2 97%     General:  no acute distress  HEENT:  normocephalic/atraumatic  Pulmonary:  no respiratory distress    Neurologic  Mental Status:  alert, oriented x 3, follows commands, speech clear and fluent, naming and repetition normal  Cranial Nerves:  EOMI with normal smooth pursuit, facial movements symmetric, hearing not formally tested but intact to conversation, no dysarthria, shoulder shrug equal bilaterally, tongue protrusion midline  Motor:  no abnormal movements, able to move all limbs antigravity spontaneously with no signs of hemiparesis observed, no pronator drift  Reflexes:  1/4 in bilateral upper and lower extremities  Sensory:  Intact to light touch in bilateral upper and lower extremities  Coordination:  normal finger-to-nose and heel-to-shin bilaterally without dysmetria,  rapid alternating movements symmetric  Station/Gait:  Deferred    Neuroimaging: as per HPI  Labs:    Recent Labs   Lab Test 07/06/21  0513 07/05/21  1602 07/04/21  1534   INR 1.70* 2.18* 2.81*        Recent Labs   Lab Test 06/27/21  0725   CHOL 65   HDL 18*   LDL 31   TRIG 80       Recent Labs   Lab Test 07/02/21  0906   A1C 5.9*       No lab results found.    Answers for HPI/ROS submitted by the patient on 10/31/2021  General Symptoms: No  Skin Symptoms: No  HENT Symptoms: Yes  EYE SYMPTOMS: No  HEART SYMPTOMS: No  LUNG SYMPTOMS: Yes  INTESTINAL SYMPTOMS: No  URINARY SYMPTOMS: No  GYNECOLOGIC SYMPTOMS: No  BREAST SYMPTOMS: No  SKELETAL SYMPTOMS: No  BLOOD SYMPTOMS: No  NERVOUS SYSTEM SYMPTOMS: No  MENTAL HEALTH SYMPTOMS: No  Congestion: Yes   Voice hoarseness: No  Tooth pain: No  Gum tenderness: No  Bleeding gums: No  Change in taste: No  Change in sense of smell: No  Dry mouth: No  Hearing aid used: No  Neck lump: No  Sputum or phlegm: No  Coughing up blood: No  Snoring: No  Difficulty breathing on exertion: No  Nighttime Cough: No  Difficulty breathing when lying flat: No          Sincerely,    José Miguel Fernandes MD

## 2023-05-03 ENCOUNTER — LAB REQUISITION (OUTPATIENT)
Dept: LAB | Facility: CLINIC | Age: 88
End: 2023-05-03
Payer: COMMERCIAL

## 2023-05-03 DIAGNOSIS — I10 ESSENTIAL (PRIMARY) HYPERTENSION: ICD-10-CM

## 2023-05-03 DIAGNOSIS — I25.10 ATHEROSCLEROTIC HEART DISEASE OF NATIVE CORONARY ARTERY WITHOUT ANGINA PECTORIS: ICD-10-CM

## 2023-05-03 DIAGNOSIS — I63.342: ICD-10-CM

## 2023-05-03 DIAGNOSIS — D64.9 ANEMIA, UNSPECIFIED: ICD-10-CM

## 2023-05-04 LAB
ANION GAP SERPL CALCULATED.3IONS-SCNC: 9 MMOL/L (ref 7–15)
BASOPHILS # BLD AUTO: 0.1 10E3/UL (ref 0–0.2)
BASOPHILS NFR BLD AUTO: 1 %
BUN SERPL-MCNC: 20.3 MG/DL (ref 8–23)
CALCIUM SERPL-MCNC: 8.9 MG/DL (ref 8.2–9.6)
CHLORIDE SERPL-SCNC: 113 MMOL/L (ref 98–107)
CREAT SERPL-MCNC: 0.93 MG/DL (ref 0.51–0.95)
DEPRECATED HCO3 PLAS-SCNC: 23 MMOL/L (ref 22–29)
EOSINOPHIL # BLD AUTO: 0.2 10E3/UL (ref 0–0.7)
EOSINOPHIL NFR BLD AUTO: 3 %
ERYTHROCYTE [DISTWIDTH] IN BLOOD BY AUTOMATED COUNT: 14.6 % (ref 10–15)
GFR SERPL CREATININE-BSD FRML MDRD: 58 ML/MIN/1.73M2
GLUCOSE SERPL-MCNC: 89 MG/DL (ref 70–99)
HCT VFR BLD AUTO: 34.6 % (ref 35–47)
HGB BLD-MCNC: 10.5 G/DL (ref 11.7–15.7)
IMM GRANULOCYTES # BLD: 0 10E3/UL
IMM GRANULOCYTES NFR BLD: 0 %
LYMPHOCYTES # BLD AUTO: 2 10E3/UL (ref 0.8–5.3)
LYMPHOCYTES NFR BLD AUTO: 39 %
MCH RBC QN AUTO: 28.1 PG (ref 26.5–33)
MCHC RBC AUTO-ENTMCNC: 30.3 G/DL (ref 31.5–36.5)
MCV RBC AUTO: 93 FL (ref 78–100)
MONOCYTES # BLD AUTO: 0.4 10E3/UL (ref 0–1.3)
MONOCYTES NFR BLD AUTO: 8 %
NEUTROPHILS # BLD AUTO: 2.5 10E3/UL (ref 1.6–8.3)
NEUTROPHILS NFR BLD AUTO: 49 %
NRBC # BLD AUTO: 0 10E3/UL
NRBC BLD AUTO-RTO: 0 /100
PLATELET # BLD AUTO: 187 10E3/UL (ref 150–450)
POTASSIUM SERPL-SCNC: 4.5 MMOL/L (ref 3.4–5.3)
RBC # BLD AUTO: 3.74 10E6/UL (ref 3.8–5.2)
SODIUM SERPL-SCNC: 145 MMOL/L (ref 136–145)
WBC # BLD AUTO: 5.2 10E3/UL (ref 4–11)

## 2023-05-04 PROCEDURE — P9604 ONE-WAY ALLOW PRORATED TRIP: HCPCS | Mod: ORL | Performed by: FAMILY MEDICINE

## 2023-05-04 PROCEDURE — 36415 COLL VENOUS BLD VENIPUNCTURE: CPT | Mod: ORL | Performed by: FAMILY MEDICINE

## 2023-05-04 PROCEDURE — 80048 BASIC METABOLIC PNL TOTAL CA: CPT | Mod: ORL | Performed by: FAMILY MEDICINE

## 2023-05-04 PROCEDURE — 85025 COMPLETE CBC W/AUTO DIFF WBC: CPT | Mod: ORL | Performed by: FAMILY MEDICINE

## 2023-07-17 NOTE — PROGRESS NOTES
"[facility]    Stroke Progress Note    Interval Events  Clinically stable overnight. Tolerating heparin gtt without worsening of neuro status or clear evidence of ongoing GIB.    Impression  1. 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 If after a few months, follow up CTA demonstrates resolution of thrombus then can transition back to DOAC in the outpatient setting.    Plan  - Continue q4 hour neurochecks   - Okay to transition from heparin gtt to therapeutic lovenox bridging to warfarin   - Goal normotension, <130/80 or lower if tolerated for overall reduced cardiovascular risk. Please titrate enteral meds to achieve. IV PRN BP meds only for SBP >180.  - Decrease atorvastatin 40 mg daily to 20 mg daily in light of LDL 31 and increased risk of ICH in LDL<40  - TTE with bubble study    The Stroke Staff is Dr. Meade.    Melissa Abbott MD  Vascular Neurology Fellow  To page me or covering stroke neurology team member, click here: AMCOM   Choose \"On Call\" tab at top, then search dropdown box for \"Neurology Adult\", select location, press Enter, then look for stroke/neuro ICU/telestroke.    ______________________________________________________    Medications   Home Meds  Prior to Admission medications    Medication Sig Start Date End Date Taking? Authorizing Provider   acetaminophen (TYLENOL) 325 MG tablet Take 650 mg by mouth every 8 hours as needed for mild pain   Yes Unknown, Entered By History   apixaban ANTICOAGULANT (ELIQUIS) 5 MG tablet Take 5 mg by mouth 2 times daily   Yes Unknown, Entered By History   atorvastatin (LIPITOR) 40 MG tablet Take 40 mg by mouth At Bedtime   Yes Unknown, Entered By History   cyanocobalamin (VITAMIN " B-12) 1000 MCG tablet Take 1,000 mcg by mouth daily   Yes Unknown, Entered By History   ferrous sulfate (FEROSUL) 325 (65 Fe) MG tablet Take 325 mg by mouth three times a week Mon - Wed - Fri. Takes with orange juice.   Yes Unknown, Entered By History   folic acid (FOLVITE) 400 MCG tablet Take 2,000 mcg by mouth daily 400 mcg x 5 tablets for 1 month then on 7/18/21 decrease to 400 mcg daily   Yes Unknown, Entered By History   gabapentin (NEURONTIN) 300 MG capsule Take 300 mg by mouth At Bedtime   Yes Unknown, Entered By History   latanoprost (XALATAN) 0.005 % ophthalmic solution Place 1 drop into both eyes At Bedtime   Yes Unknown, Entered By History   metoprolol tartrate (LOPRESSOR) 25 MG tablet Take 12.5 mg by mouth 2 times daily   Yes Unknown, Entered By History   mirtazapine (REMERON SOL-TAB) 15 MG ODT 15 mg by Orally disintegrating tablet route At Bedtime   Yes Unknown, Entered By History   omeprazole (PRILOSEC) 20 MG DR capsule Take 20 mg by mouth daily   Yes Unknown, Entered By History   ondansetron (ZOFRAN-ODT) 4 MG ODT tab Take 4 mg by mouth every morning   Yes Unknown, Entered By History   ondansetron (ZOFRAN-ODT) 4 MG ODT tab Take 4 mg by mouth 2 times daily as needed for nausea   Yes Unknown, Entered By History       Scheduled Meds    atorvastatin  20 mg Oral QPM     cyanocobalamin  1,000 mcg Oral Daily     folic acid  2,000 mcg Oral Daily     gabapentin  200 mg Oral At Bedtime     gadobutrol  8 mL Intravenous Once     metoprolol tartrate  12.5 mg Oral BID     pantoprazole (PROTONIX) IV  40 mg Intravenous BID     piperacillin-tazobactam  2.25 g Intravenous Q6H     potassium chloride  20 mEq Oral or Feeding Tube Once     sodium chloride 0.9 %  100 mL As instructed Once     sodium chloride (PF)  3 mL Intracatheter Q8H     sodium phosphate  9 mmol Intravenous Once       Infusion Meds    heparin 750 Units/hr (06/29/21 0912)     - MEDICATION INSTRUCTIONS -         PRN Meds  acetaminophen, lidocaine 4%,  No lidocaine (buffered or not buffered), melatonin, ondansetron **OR** ondansetron, - MEDICATION INSTRUCTIONS -, sodium chloride (PF)       PHYSICAL EXAMINATION  Temp:  [97.4  F (36.3  C)-98.4  F (36.9  C)] 97.4  F (36.3  C)  Pulse:  [] 75  Resp:  [11-16] 16  BP: (104-138)/(54-81) 106/54  SpO2:  [94 %-97 %] 95 %     General:  patient lying in bed without any acute distress    HEENT:  normocephalic/atraumatic, edentulous  Cardio:  irregularly irregular  Pulmonary:  no respiratory distress  Abdomen:  soft, non-tender, non-distended  Extremities:  no edema, peripheral pulses palpable  Skin:  intact, warm/dry      Neurologic  Mental Status:  Eyes open spontaneously, alert and attentive, oriented to self, time, place and circumstnce and hospital, but not time or circumstance. Spontaneous language is fluent and coherent with intact comprehension of simple commands, naming, and repetition.  Cranial Nerves:  VFF, pupils 2 mm, round, minimally reactive, dysconjugate gaze at rest, L eye exotropia, right facial droop, mild dysarthria  Motor:  normal muscle tone and bulk, no abnormal movements, able to move all limbs spontaneously, no drift BUEs, no antigravity strength BLEs, wiggles toes readily  Reflexes:  no clonus, toes mute  Sensory:  detects noxious in 4/4 extremities  Coordination:  not ataxia of observable movements  Station/Gait:  not tested    Stroke Scales    NIHSS  Interval (daily exam) (06/29/21 1150)   Interval Comments     1a. Level of Consciousness 0-->Alert, keenly responsive   1b. LOC Questions 0-->Answers both questions correctly   1c. LOC Commands 0-->Performs both tasks correctly   2.   Best Gaze 0-->Normal   3.   Visual 0-->No visual loss   4.   Facial Palsy 2-->Partial paralysis (total or near-total paralysis of lower face)   5a. Motor Arm, Left 0-->No drift, limb holds 90 (or 45) degrees for full 10 secs   5b. Motor Arm, Right 0-->No drift, limb holds 90 (or 45) degrees for full 10 secs   6a. Motor  Leg, Left 2-->Some effort against gravity, leg falls to bed by 5 secs, but has some effort against gravity   6b. Motor Leg, right 2-->Some effort against gravity, leg falls to bed by 5 secs, but has some effort against gravity   7.   Limb Ataxia 0-->Absent   8.   Sensory 0-->Normal, no sensory loss   9.   Best Language 0-->No aphasia, normal   10. Dysarthria 0-->Normal   11. Extinction and Inattention  0-->No abnormality   Total 6 (06/29/21 1150)       Imaging  I personally reviewed all imaging; relevant findings per HPI.     Lab Results Data   CBC  Recent Labs   Lab 06/29/21  0741 06/28/21  0744 06/27/21  0725   WBC 8.0 11.4* 12.1*   RBC 3.22* 3.54* 3.38*   HGB 8.2* 9.2* 8.7*   HCT 27.2* 29.4* 28.6*    304 253     Basic Metabolic Panel    Recent Labs   Lab 06/29/21  0741 06/28/21  1424 06/28/21  0855 06/28/21  0744   *  --  145* 144   POTASSIUM 3.2* 3.5 3.2* 3.3*   CHLORIDE 116*  --  117* 115*   CO2 22  --  21 22   BUN 12  --  13 14   CR 1.37*  --  1.36* 1.42*   *  --  118* 106*   MANDI 7.8*  --  7.7* 7.8*     Liver Panel  Recent Labs   Lab 06/29/21  0741 06/28/21  0855 06/28/21  0744   PROTTOTAL  --  6.3*  --    ALBUMIN 1.5* 1.6* 1.6*   BILITOTAL  --  0.3  --    ALKPHOS  --  77  --    AST  --  16  --    ALT  --  13  --      INR  No lab results found.   Lipid Profile    Recent Labs   Lab Test 06/27/21  0725   CHOL 65   HDL 18*   LDL 31   TRIG 80     A1C  No lab results found.  Troponin I  No results for input(s): TROPI in the last 168 hours.

## 2023-10-21 ENCOUNTER — HEALTH MAINTENANCE LETTER (OUTPATIENT)
Age: 88
End: 2023-10-21

## 2024-01-10 ENCOUNTER — LAB REQUISITION (OUTPATIENT)
Dept: LAB | Facility: CLINIC | Age: 89
End: 2024-01-10
Payer: COMMERCIAL

## 2024-01-10 DIAGNOSIS — I10 ESSENTIAL (PRIMARY) HYPERTENSION: ICD-10-CM

## 2024-01-11 LAB
ANION GAP SERPL CALCULATED.3IONS-SCNC: 10 MMOL/L (ref 7–15)
BUN SERPL-MCNC: 12.9 MG/DL (ref 8–23)
CALCIUM SERPL-MCNC: 9 MG/DL (ref 8.2–9.6)
CHLORIDE SERPL-SCNC: 107 MMOL/L (ref 98–107)
CREAT SERPL-MCNC: 0.92 MG/DL (ref 0.51–0.95)
DEPRECATED HCO3 PLAS-SCNC: 23 MMOL/L (ref 22–29)
EGFRCR SERPLBLD CKD-EPI 2021: 58 ML/MIN/1.73M2
GLUCOSE SERPL-MCNC: 82 MG/DL (ref 70–99)
POTASSIUM SERPL-SCNC: 4.6 MMOL/L (ref 3.4–5.3)
SODIUM SERPL-SCNC: 140 MMOL/L (ref 135–145)

## 2024-01-11 PROCEDURE — 80048 BASIC METABOLIC PNL TOTAL CA: CPT | Mod: ORL | Performed by: NURSE PRACTITIONER

## 2024-01-11 PROCEDURE — 36415 COLL VENOUS BLD VENIPUNCTURE: CPT | Mod: ORL | Performed by: NURSE PRACTITIONER

## 2024-01-11 PROCEDURE — P9604 ONE-WAY ALLOW PRORATED TRIP: HCPCS | Mod: ORL | Performed by: NURSE PRACTITIONER

## 2024-05-10 ENCOUNTER — LAB REQUISITION (OUTPATIENT)
Dept: LAB | Facility: CLINIC | Age: 89
End: 2024-05-10
Payer: COMMERCIAL

## 2024-05-10 DIAGNOSIS — I10 ESSENTIAL (PRIMARY) HYPERTENSION: ICD-10-CM

## 2024-05-10 DIAGNOSIS — D64.9 ANEMIA, UNSPECIFIED: ICD-10-CM

## 2024-05-10 DIAGNOSIS — I63.342: ICD-10-CM

## 2024-05-10 DIAGNOSIS — I25.10 ATHEROSCLEROTIC HEART DISEASE OF NATIVE CORONARY ARTERY WITHOUT ANGINA PECTORIS: ICD-10-CM

## 2024-05-13 LAB
ANION GAP SERPL CALCULATED.3IONS-SCNC: 8 MMOL/L (ref 7–15)
BUN SERPL-MCNC: 22.3 MG/DL (ref 8–23)
CALCIUM SERPL-MCNC: 9 MG/DL (ref 8.2–9.6)
CHLORIDE SERPL-SCNC: 113 MMOL/L (ref 98–107)
CREAT SERPL-MCNC: 0.86 MG/DL (ref 0.51–0.95)
DEPRECATED HCO3 PLAS-SCNC: 22 MMOL/L (ref 22–29)
EGFRCR SERPLBLD CKD-EPI 2021: 63 ML/MIN/1.73M2
ERYTHROCYTE [DISTWIDTH] IN BLOOD BY AUTOMATED COUNT: 13.4 % (ref 10–15)
GLUCOSE SERPL-MCNC: 97 MG/DL (ref 70–99)
HCT VFR BLD AUTO: 35 % (ref 35–47)
HGB BLD-MCNC: 12 G/DL (ref 11.7–15.7)
MCH RBC QN AUTO: 31.7 PG (ref 26.5–33)
MCHC RBC AUTO-ENTMCNC: 34.3 G/DL (ref 31.5–36.5)
MCV RBC AUTO: 93 FL (ref 78–100)
PLATELET # BLD AUTO: 182 10E3/UL (ref 150–450)
POTASSIUM SERPL-SCNC: 4.5 MMOL/L (ref 3.4–5.3)
RBC # BLD AUTO: 3.78 10E6/UL (ref 3.8–5.2)
SODIUM SERPL-SCNC: 143 MMOL/L (ref 135–145)
WBC # BLD AUTO: 5.2 10E3/UL (ref 4–11)

## 2024-05-13 PROCEDURE — P9604 ONE-WAY ALLOW PRORATED TRIP: HCPCS | Mod: ORL | Performed by: FAMILY MEDICINE

## 2024-05-13 PROCEDURE — 80048 BASIC METABOLIC PNL TOTAL CA: CPT | Mod: ORL | Performed by: FAMILY MEDICINE

## 2024-05-13 PROCEDURE — 36415 COLL VENOUS BLD VENIPUNCTURE: CPT | Mod: ORL | Performed by: FAMILY MEDICINE

## 2024-05-13 PROCEDURE — 85027 COMPLETE CBC AUTOMATED: CPT | Mod: ORL | Performed by: FAMILY MEDICINE

## 2024-07-12 ENCOUNTER — LAB REQUISITION (OUTPATIENT)
Dept: LAB | Facility: CLINIC | Age: 89
End: 2024-07-12
Payer: COMMERCIAL

## 2024-07-12 DIAGNOSIS — I25.10 ATHEROSCLEROTIC HEART DISEASE OF NATIVE CORONARY ARTERY WITHOUT ANGINA PECTORIS: ICD-10-CM

## 2024-07-12 DIAGNOSIS — D64.9 ANEMIA, UNSPECIFIED: ICD-10-CM

## 2024-07-12 DIAGNOSIS — I63.342: ICD-10-CM

## 2024-07-12 DIAGNOSIS — I10 ESSENTIAL (PRIMARY) HYPERTENSION: ICD-10-CM

## 2024-07-15 LAB
ANION GAP SERPL CALCULATED.3IONS-SCNC: 11 MMOL/L (ref 7–15)
BUN SERPL-MCNC: 16.8 MG/DL (ref 8–23)
CALCIUM SERPL-MCNC: 9.1 MG/DL (ref 8.2–9.6)
CHLORIDE SERPL-SCNC: 106 MMOL/L (ref 98–107)
CREAT SERPL-MCNC: 0.95 MG/DL (ref 0.51–0.95)
EGFRCR SERPLBLD CKD-EPI 2021: 56 ML/MIN/1.73M2
ERYTHROCYTE [DISTWIDTH] IN BLOOD BY AUTOMATED COUNT: 14 % (ref 10–15)
FERRITIN SERPL-MCNC: 78 NG/ML (ref 11–328)
GLUCOSE SERPL-MCNC: 89 MG/DL (ref 70–99)
HCO3 SERPL-SCNC: 25 MMOL/L (ref 22–29)
HCT VFR BLD AUTO: 38.3 % (ref 35–47)
HGB BLD-MCNC: 12.2 G/DL (ref 11.7–15.7)
IRON BINDING CAPACITY (ROCHE): 279 UG/DL (ref 240–430)
IRON SATN MFR SERPL: 19 % (ref 15–46)
IRON SERPL-MCNC: 54 UG/DL (ref 37–145)
MCH RBC QN AUTO: 29.5 PG (ref 26.5–33)
MCHC RBC AUTO-ENTMCNC: 31.9 G/DL (ref 31.5–36.5)
MCV RBC AUTO: 93 FL (ref 78–100)
PLATELET # BLD AUTO: 184 10E3/UL (ref 150–450)
POTASSIUM SERPL-SCNC: 4.4 MMOL/L (ref 3.4–5.3)
RBC # BLD AUTO: 4.14 10E6/UL (ref 3.8–5.2)
SODIUM SERPL-SCNC: 142 MMOL/L (ref 135–145)
WBC # BLD AUTO: 5.2 10E3/UL (ref 4–11)

## 2024-07-15 PROCEDURE — 85027 COMPLETE CBC AUTOMATED: CPT | Mod: ORL | Performed by: FAMILY MEDICINE

## 2024-07-15 PROCEDURE — 82728 ASSAY OF FERRITIN: CPT | Mod: ORL | Performed by: FAMILY MEDICINE

## 2024-07-15 PROCEDURE — 80048 BASIC METABOLIC PNL TOTAL CA: CPT | Mod: ORL | Performed by: FAMILY MEDICINE

## 2024-07-15 PROCEDURE — P9604 ONE-WAY ALLOW PRORATED TRIP: HCPCS | Mod: ORL | Performed by: FAMILY MEDICINE

## 2024-07-15 PROCEDURE — 83550 IRON BINDING TEST: CPT | Mod: ORL | Performed by: FAMILY MEDICINE

## 2024-07-15 PROCEDURE — 36415 COLL VENOUS BLD VENIPUNCTURE: CPT | Mod: ORL | Performed by: FAMILY MEDICINE

## 2024-09-11 ENCOUNTER — LAB REQUISITION (OUTPATIENT)
Dept: LAB | Facility: CLINIC | Age: 89
End: 2024-09-11
Payer: COMMERCIAL

## 2024-09-11 DIAGNOSIS — D64.9 ANEMIA, UNSPECIFIED: ICD-10-CM

## 2024-09-12 LAB
ERYTHROCYTE [DISTWIDTH] IN BLOOD BY AUTOMATED COUNT: 13.6 % (ref 10–15)
HCT VFR BLD AUTO: 39.4 % (ref 35–47)
HGB BLD-MCNC: 12.5 G/DL (ref 11.7–15.7)
MCH RBC QN AUTO: 28.9 PG (ref 26.5–33)
MCHC RBC AUTO-ENTMCNC: 31.7 G/DL (ref 31.5–36.5)
MCV RBC AUTO: 91 FL (ref 78–100)
PLATELET # BLD AUTO: 187 10E3/UL (ref 150–450)
RBC # BLD AUTO: 4.33 10E6/UL (ref 3.8–5.2)
WBC # BLD AUTO: 5.9 10E3/UL (ref 4–11)

## 2024-09-12 PROCEDURE — 36415 COLL VENOUS BLD VENIPUNCTURE: CPT | Mod: ORL | Performed by: FAMILY MEDICINE

## 2024-09-12 PROCEDURE — P9604 ONE-WAY ALLOW PRORATED TRIP: HCPCS | Mod: ORL | Performed by: FAMILY MEDICINE

## 2024-09-12 PROCEDURE — 85027 COMPLETE CBC AUTOMATED: CPT | Mod: ORL | Performed by: FAMILY MEDICINE

## 2024-12-05 ENCOUNTER — LAB REQUISITION (OUTPATIENT)
Dept: LAB | Facility: CLINIC | Age: 89
End: 2024-12-05
Payer: COMMERCIAL

## 2024-12-05 DIAGNOSIS — D64.9 ANEMIA, UNSPECIFIED: ICD-10-CM

## 2024-12-08 ENCOUNTER — HEALTH MAINTENANCE LETTER (OUTPATIENT)
Age: 89
End: 2024-12-08

## 2024-12-09 LAB
ANION GAP SERPL CALCULATED.3IONS-SCNC: 9 MMOL/L (ref 7–15)
BUN SERPL-MCNC: 14.4 MG/DL (ref 8–23)
CALCIUM SERPL-MCNC: 8.8 MG/DL (ref 8.8–10.4)
CHLORIDE SERPL-SCNC: 112 MMOL/L (ref 98–107)
CREAT SERPL-MCNC: 0.83 MG/DL (ref 0.51–0.95)
EGFRCR SERPLBLD CKD-EPI 2021: 66 ML/MIN/1.73M2
EST. AVERAGE GLUCOSE BLD GHB EST-MCNC: 126 MG/DL
GLUCOSE SERPL-MCNC: 88 MG/DL (ref 70–99)
HBA1C MFR BLD: 6 %
HCO3 SERPL-SCNC: 24 MMOL/L (ref 22–29)
POTASSIUM SERPL-SCNC: 3.8 MMOL/L (ref 3.4–5.3)
SODIUM SERPL-SCNC: 145 MMOL/L (ref 135–145)

## 2024-12-09 PROCEDURE — P9604 ONE-WAY ALLOW PRORATED TRIP: HCPCS | Mod: ORL | Performed by: FAMILY MEDICINE

## 2024-12-09 PROCEDURE — 83036 HEMOGLOBIN GLYCOSYLATED A1C: CPT | Mod: ORL | Performed by: FAMILY MEDICINE

## 2024-12-09 PROCEDURE — 80048 BASIC METABOLIC PNL TOTAL CA: CPT | Mod: ORL | Performed by: FAMILY MEDICINE

## 2024-12-09 PROCEDURE — 36415 COLL VENOUS BLD VENIPUNCTURE: CPT | Mod: ORL | Performed by: FAMILY MEDICINE

## 2025-02-04 ENCOUNTER — LAB REQUISITION (OUTPATIENT)
Dept: LAB | Facility: CLINIC | Age: OVER 89
End: 2025-02-04
Payer: COMMERCIAL

## 2025-02-04 DIAGNOSIS — D64.9 ANEMIA, UNSPECIFIED: ICD-10-CM

## 2025-02-06 LAB — HGB BLD-MCNC: 11.1 G/DL (ref 11.7–15.7)

## 2025-02-06 PROCEDURE — 85018 HEMOGLOBIN: CPT | Mod: ORL | Performed by: FAMILY MEDICINE

## 2025-02-06 PROCEDURE — 36415 COLL VENOUS BLD VENIPUNCTURE: CPT | Mod: ORL | Performed by: FAMILY MEDICINE

## 2025-02-06 PROCEDURE — P9604 ONE-WAY ALLOW PRORATED TRIP: HCPCS | Mod: ORL | Performed by: FAMILY MEDICINE

## 2025-02-12 ENCOUNTER — LAB REQUISITION (OUTPATIENT)
Dept: LAB | Facility: CLINIC | Age: OVER 89
End: 2025-02-12
Payer: COMMERCIAL

## 2025-02-12 DIAGNOSIS — I10 ESSENTIAL (PRIMARY) HYPERTENSION: ICD-10-CM

## 2025-02-17 LAB
ANION GAP SERPL CALCULATED.3IONS-SCNC: 11 MMOL/L (ref 7–15)
BUN SERPL-MCNC: 13.7 MG/DL (ref 8–23)
CALCIUM SERPL-MCNC: 9 MG/DL (ref 8.8–10.4)
CHLORIDE SERPL-SCNC: 108 MMOL/L (ref 98–107)
CREAT SERPL-MCNC: 0.89 MG/DL (ref 0.51–0.95)
EGFRCR SERPLBLD CKD-EPI 2021: 60 ML/MIN/1.73M2
GLUCOSE SERPL-MCNC: 90 MG/DL (ref 70–99)
HCO3 SERPL-SCNC: 24 MMOL/L (ref 22–29)
POTASSIUM SERPL-SCNC: 4 MMOL/L (ref 3.4–5.3)
SODIUM SERPL-SCNC: 143 MMOL/L (ref 135–145)

## 2025-02-17 PROCEDURE — 36415 COLL VENOUS BLD VENIPUNCTURE: CPT | Mod: ORL | Performed by: NURSE PRACTITIONER

## 2025-02-17 PROCEDURE — 80048 BASIC METABOLIC PNL TOTAL CA: CPT | Mod: ORL | Performed by: NURSE PRACTITIONER

## 2025-02-17 PROCEDURE — P9604 ONE-WAY ALLOW PRORATED TRIP: HCPCS | Mod: ORL | Performed by: NURSE PRACTITIONER

## 2025-03-18 ENCOUNTER — LAB REQUISITION (OUTPATIENT)
Dept: LAB | Facility: CLINIC | Age: OVER 89
End: 2025-03-18
Payer: COMMERCIAL

## 2025-03-18 DIAGNOSIS — D64.9 ANEMIA, UNSPECIFIED: ICD-10-CM

## 2025-03-20 LAB
ERYTHROCYTE [DISTWIDTH] IN BLOOD BY AUTOMATED COUNT: 14.3 % (ref 10–15)
FOLATE SERPL-MCNC: 30 NG/ML (ref 4.6–34.8)
HCT VFR BLD AUTO: 36 % (ref 35–47)
HGB BLD-MCNC: 11.2 G/DL (ref 11.7–15.7)
IRON BINDING CAPACITY (ROCHE): 296 UG/DL (ref 240–430)
IRON SATN MFR SERPL: 17 % (ref 15–46)
IRON SERPL-MCNC: 50 UG/DL (ref 37–145)
MCH RBC QN AUTO: 28.4 PG (ref 26.5–33)
MCHC RBC AUTO-ENTMCNC: 31.1 G/DL (ref 31.5–36.5)
MCV RBC AUTO: 91 FL (ref 78–100)
PLATELET # BLD AUTO: 179 10E3/UL (ref 150–450)
RBC # BLD AUTO: 3.94 10E6/UL (ref 3.8–5.2)
TRANSFERRIN SERPL-MCNC: 249 MG/DL (ref 200–360)
VIT B12 SERPL-MCNC: 330 PG/ML (ref 232–1245)
WBC # BLD AUTO: 5.1 10E3/UL (ref 4–11)

## 2025-03-20 PROCEDURE — 83540 ASSAY OF IRON: CPT | Mod: ORL | Performed by: FAMILY MEDICINE

## 2025-03-20 PROCEDURE — 85027 COMPLETE CBC AUTOMATED: CPT | Mod: ORL | Performed by: FAMILY MEDICINE

## 2025-03-20 PROCEDURE — 84466 ASSAY OF TRANSFERRIN: CPT | Mod: ORL | Performed by: FAMILY MEDICINE

## 2025-03-20 PROCEDURE — 83550 IRON BINDING TEST: CPT | Mod: ORL | Performed by: FAMILY MEDICINE

## 2025-03-20 PROCEDURE — 36415 COLL VENOUS BLD VENIPUNCTURE: CPT | Mod: ORL | Performed by: FAMILY MEDICINE

## 2025-03-20 PROCEDURE — 82607 VITAMIN B-12: CPT | Mod: ORL | Performed by: FAMILY MEDICINE

## 2025-03-20 PROCEDURE — P9604 ONE-WAY ALLOW PRORATED TRIP: HCPCS | Mod: ORL | Performed by: FAMILY MEDICINE

## 2025-03-20 PROCEDURE — 82746 ASSAY OF FOLIC ACID SERUM: CPT | Mod: ORL | Performed by: FAMILY MEDICINE

## 2025-04-02 ENCOUNTER — LAB REQUISITION (OUTPATIENT)
Dept: LAB | Facility: CLINIC | Age: OVER 89
End: 2025-04-02
Payer: COMMERCIAL

## 2025-04-02 DIAGNOSIS — I10 ESSENTIAL (PRIMARY) HYPERTENSION: ICD-10-CM

## 2025-04-02 DIAGNOSIS — R63.5 ABNORMAL WEIGHT GAIN: ICD-10-CM

## 2025-04-03 LAB
NT-PROBNP SERPL-MCNC: 1516 PG/ML (ref 0–1800)
TSH SERPL DL<=0.005 MIU/L-ACNC: 3.91 UIU/ML (ref 0.3–4.2)

## 2025-04-03 PROCEDURE — 84443 ASSAY THYROID STIM HORMONE: CPT | Mod: ORL | Performed by: NURSE PRACTITIONER

## 2025-04-03 PROCEDURE — 83880 ASSAY OF NATRIURETIC PEPTIDE: CPT | Mod: ORL | Performed by: NURSE PRACTITIONER

## 2025-04-03 PROCEDURE — 36415 COLL VENOUS BLD VENIPUNCTURE: CPT | Mod: ORL | Performed by: NURSE PRACTITIONER

## 2025-04-03 PROCEDURE — P9604 ONE-WAY ALLOW PRORATED TRIP: HCPCS | Mod: ORL | Performed by: NURSE PRACTITIONER

## 2025-04-07 LAB
ANION GAP SERPL CALCULATED.3IONS-SCNC: 11 MMOL/L (ref 7–15)
BUN SERPL-MCNC: 15.5 MG/DL (ref 8–23)
CALCIUM SERPL-MCNC: 8.9 MG/DL (ref 8.8–10.4)
CHLORIDE SERPL-SCNC: 106 MMOL/L (ref 98–107)
CREAT SERPL-MCNC: 0.92 MG/DL (ref 0.51–0.95)
EGFRCR SERPLBLD CKD-EPI 2021: 58 ML/MIN/1.73M2
GLUCOSE SERPL-MCNC: 89 MG/DL (ref 70–99)
HCO3 SERPL-SCNC: 25 MMOL/L (ref 22–29)
POTASSIUM SERPL-SCNC: 4.1 MMOL/L (ref 3.4–5.3)
SODIUM SERPL-SCNC: 142 MMOL/L (ref 135–145)

## 2025-04-07 PROCEDURE — 80048 BASIC METABOLIC PNL TOTAL CA: CPT | Mod: ORL | Performed by: NURSE PRACTITIONER

## 2025-04-07 PROCEDURE — 36415 COLL VENOUS BLD VENIPUNCTURE: CPT | Mod: ORL | Performed by: NURSE PRACTITIONER

## 2025-04-07 PROCEDURE — P9604 ONE-WAY ALLOW PRORATED TRIP: HCPCS | Mod: ORL | Performed by: NURSE PRACTITIONER

## 2025-07-01 ENCOUNTER — LAB REQUISITION (OUTPATIENT)
Dept: LAB | Facility: CLINIC | Age: OVER 89
End: 2025-07-01
Payer: COMMERCIAL

## 2025-07-01 DIAGNOSIS — D64.9 ANEMIA, UNSPECIFIED: ICD-10-CM

## 2025-07-07 LAB
ERYTHROCYTE [DISTWIDTH] IN BLOOD BY AUTOMATED COUNT: 14.3 % (ref 10–15)
HCT VFR BLD AUTO: 36.4 % (ref 35–47)
HGB BLD-MCNC: 11.2 G/DL (ref 11.7–15.7)
MCH RBC QN AUTO: 28 PG (ref 26.5–33)
MCHC RBC AUTO-ENTMCNC: 30.8 G/DL (ref 31.5–36.5)
MCV RBC AUTO: 91 FL (ref 78–100)
PLATELET # BLD AUTO: 183 10E3/UL (ref 150–450)
RBC # BLD AUTO: 4 10E6/UL (ref 3.8–5.2)
WBC # BLD AUTO: 5 10E3/UL (ref 4–11)

## 2025-07-07 PROCEDURE — P9604 ONE-WAY ALLOW PRORATED TRIP: HCPCS | Mod: ORL | Performed by: NURSE PRACTITIONER

## 2025-07-07 PROCEDURE — 85027 COMPLETE CBC AUTOMATED: CPT | Mod: ORL | Performed by: NURSE PRACTITIONER

## 2025-07-07 PROCEDURE — 36415 COLL VENOUS BLD VENIPUNCTURE: CPT | Mod: ORL | Performed by: NURSE PRACTITIONER

## (undated) RX ORDER — FENTANYL CITRATE 50 UG/ML
INJECTION, SOLUTION INTRAMUSCULAR; INTRAVENOUS
Status: DISPENSED
Start: 2021-07-05

## (undated) RX ORDER — GLYCOPYRROLATE 0.2 MG/ML
INJECTION, SOLUTION INTRAMUSCULAR; INTRAVENOUS
Status: DISPENSED
Start: 2021-07-05

## (undated) RX ORDER — NALOXONE HYDROCHLORIDE 0.4 MG/ML
INJECTION, SOLUTION INTRAMUSCULAR; INTRAVENOUS; SUBCUTANEOUS
Status: DISPENSED
Start: 2021-07-05

## (undated) RX ORDER — LIDOCAINE HYDROCHLORIDE 40 MG/ML
SOLUTION TOPICAL
Status: DISPENSED
Start: 2021-07-05

## (undated) RX ORDER — FLUMAZENIL 0.1 MG/ML
INJECTION, SOLUTION INTRAVENOUS
Status: DISPENSED
Start: 2021-07-05